# Patient Record
Sex: FEMALE | Race: BLACK OR AFRICAN AMERICAN | Employment: UNEMPLOYED | URBAN - METROPOLITAN AREA
[De-identification: names, ages, dates, MRNs, and addresses within clinical notes are randomized per-mention and may not be internally consistent; named-entity substitution may affect disease eponyms.]

---

## 2023-10-20 ENCOUNTER — HOSPITAL ENCOUNTER (EMERGENCY)
Age: 43
Discharge: ANOTHER HOSPITAL | End: 2023-10-21
Attending: STUDENT IN AN ORGANIZED HEALTH CARE EDUCATION/TRAINING PROGRAM
Payer: COMMERCIAL

## 2023-10-20 ENCOUNTER — APPOINTMENT (OUTPATIENT)
Dept: GENERAL RADIOLOGY | Age: 43
End: 2023-10-20
Payer: COMMERCIAL

## 2023-10-20 DIAGNOSIS — I63.9 CEREBROVASCULAR ACCIDENT (CVA), UNSPECIFIED MECHANISM (HCC): Primary | ICD-10-CM

## 2023-10-20 DIAGNOSIS — F19.10 POLYSUBSTANCE ABUSE (HCC): ICD-10-CM

## 2023-10-20 DIAGNOSIS — R94.31 PROLONGED Q-T INTERVAL ON ECG: ICD-10-CM

## 2023-10-20 LAB
APTT BLD: 26.6 SEC (ref 22.7–35.9)
INR PPP: 1.11 (ref 0.84–1.16)
PROTHROMBIN TIME: 14.3 SEC (ref 11.5–14.8)

## 2023-10-20 PROCEDURE — 80307 DRUG TEST PRSMV CHEM ANLYZR: CPT

## 2023-10-20 PROCEDURE — 83735 ASSAY OF MAGNESIUM: CPT

## 2023-10-20 PROCEDURE — 82140 ASSAY OF AMMONIA: CPT

## 2023-10-20 PROCEDURE — 80143 DRUG ASSAY ACETAMINOPHEN: CPT

## 2023-10-20 PROCEDURE — 99285 EMERGENCY DEPT VISIT HI MDM: CPT

## 2023-10-20 PROCEDURE — 93005 ELECTROCARDIOGRAM TRACING: CPT | Performed by: STUDENT IN AN ORGANIZED HEALTH CARE EDUCATION/TRAINING PROGRAM

## 2023-10-20 PROCEDURE — 80053 COMPREHEN METABOLIC PANEL: CPT

## 2023-10-20 PROCEDURE — 2580000003 HC RX 258: Performed by: STUDENT IN AN ORGANIZED HEALTH CARE EDUCATION/TRAINING PROGRAM

## 2023-10-20 PROCEDURE — 84443 ASSAY THYROID STIM HORMONE: CPT

## 2023-10-20 PROCEDURE — 31500 INSERT EMERGENCY AIRWAY: CPT

## 2023-10-20 PROCEDURE — 84480 ASSAY TRIIODOTHYRONINE (T3): CPT

## 2023-10-20 PROCEDURE — 71045 X-RAY EXAM CHEST 1 VIEW: CPT

## 2023-10-20 PROCEDURE — 85610 PROTHROMBIN TIME: CPT

## 2023-10-20 PROCEDURE — 85730 THROMBOPLASTIN TIME PARTIAL: CPT

## 2023-10-20 PROCEDURE — 84703 CHORIONIC GONADOTROPIN ASSAY: CPT

## 2023-10-20 PROCEDURE — 80179 DRUG ASSAY SALICYLATE: CPT

## 2023-10-20 PROCEDURE — 82077 ASSAY SPEC XCP UR&BREATH IA: CPT

## 2023-10-20 PROCEDURE — 84484 ASSAY OF TROPONIN QUANT: CPT

## 2023-10-20 PROCEDURE — 84439 ASSAY OF FREE THYROXINE: CPT

## 2023-10-20 PROCEDURE — 83605 ASSAY OF LACTIC ACID: CPT

## 2023-10-20 PROCEDURE — 85025 COMPLETE CBC W/AUTO DIFF WBC: CPT

## 2023-10-20 PROCEDURE — 82803 BLOOD GASES ANY COMBINATION: CPT

## 2023-10-20 PROCEDURE — 83880 ASSAY OF NATRIURETIC PEPTIDE: CPT

## 2023-10-20 PROCEDURE — 83690 ASSAY OF LIPASE: CPT

## 2023-10-20 RX ORDER — 0.9 % SODIUM CHLORIDE 0.9 %
1000 INTRAVENOUS SOLUTION INTRAVENOUS ONCE
Status: COMPLETED | OUTPATIENT
Start: 2023-10-20 | End: 2023-10-21

## 2023-10-20 RX ADMIN — SODIUM CHLORIDE 1000 ML: 9 INJECTION, SOLUTION INTRAVENOUS at 23:37

## 2023-10-21 ENCOUNTER — APPOINTMENT (OUTPATIENT)
Dept: CT IMAGING | Age: 43
End: 2023-10-21
Payer: COMMERCIAL

## 2023-10-21 ENCOUNTER — APPOINTMENT (OUTPATIENT)
Dept: GENERAL RADIOLOGY | Age: 43
End: 2023-10-21
Attending: INTERNAL MEDICINE
Payer: COMMERCIAL

## 2023-10-21 ENCOUNTER — APPOINTMENT (OUTPATIENT)
Dept: CT IMAGING | Age: 43
End: 2023-10-21
Attending: INTERNAL MEDICINE
Payer: COMMERCIAL

## 2023-10-21 ENCOUNTER — ANESTHESIA EVENT (OUTPATIENT)
Dept: OPERATING ROOM | Age: 43
End: 2023-10-21
Payer: COMMERCIAL

## 2023-10-21 ENCOUNTER — APPOINTMENT (OUTPATIENT)
Dept: GENERAL RADIOLOGY | Age: 43
End: 2023-10-21
Payer: COMMERCIAL

## 2023-10-21 ENCOUNTER — ANESTHESIA (OUTPATIENT)
Dept: OPERATING ROOM | Age: 43
End: 2023-10-21
Payer: COMMERCIAL

## 2023-10-21 ENCOUNTER — HOSPITAL ENCOUNTER (INPATIENT)
Age: 43
LOS: 14 days | Discharge: NURSING, REHAB OR LONG-TERM CARE | End: 2023-11-04
Attending: INTERNAL MEDICINE | Admitting: INTERNAL MEDICINE
Payer: COMMERCIAL

## 2023-10-21 VITALS
SYSTOLIC BLOOD PRESSURE: 173 MMHG | OXYGEN SATURATION: 100 % | WEIGHT: 263.23 LBS | RESPIRATION RATE: 22 BRPM | HEART RATE: 97 BPM | DIASTOLIC BLOOD PRESSURE: 127 MMHG

## 2023-10-21 DIAGNOSIS — I63.512 LEFT MIDDLE CEREBRAL ARTERY STROKE (HCC): ICD-10-CM

## 2023-10-21 DIAGNOSIS — I63.9 ACUTE CEREBROVASCULAR ACCIDENT (CVA) (HCC): ICD-10-CM

## 2023-10-21 DIAGNOSIS — I63.512 ACUTE ISCHEMIC LEFT MCA STROKE (HCC): Primary | ICD-10-CM

## 2023-10-21 PROBLEM — G93.5 BRAIN COMPRESSION (HCC): Status: ACTIVE | Noted: 2023-10-21

## 2023-10-21 PROBLEM — J96.01 ACUTE RESPIRATORY FAILURE WITH HYPOXIA (HCC): Status: ACTIVE | Noted: 2023-10-21

## 2023-10-21 PROBLEM — G93.6 CEREBRAL EDEMA (HCC): Status: ACTIVE | Noted: 2023-10-21

## 2023-10-21 PROBLEM — I63.412 CEREBROVASCULAR ACCIDENT (CVA) DUE TO EMBOLISM OF LEFT MIDDLE CEREBRAL ARTERY (HCC): Status: ACTIVE | Noted: 2023-10-21

## 2023-10-21 LAB
ABO + RH BLD: NORMAL
ABO + RH BLD: NORMAL
ALBUMIN SERPL-MCNC: 4.3 G/DL (ref 3.4–5)
ALBUMIN/GLOB SERPL: 1.1 {RATIO} (ref 1.1–2.2)
ALP SERPL-CCNC: 68 U/L (ref 40–129)
ALT SERPL-CCNC: 16 U/L (ref 10–40)
AMMONIA PLAS-SCNC: 25 UMOL/L (ref 11–51)
AMPHETAMINES UR QL SCN>1000 NG/ML: POSITIVE
ANION GAP SERPL CALCULATED.3IONS-SCNC: 13 MMOL/L (ref 3–16)
ANION GAP SERPL CALCULATED.3IONS-SCNC: 9 MMOL/L (ref 3–16)
ANISOCYTOSIS BLD QL SMEAR: ABNORMAL
APAP SERPL-MCNC: <5 UG/ML (ref 10–30)
AST SERPL-CCNC: 22 U/L (ref 15–37)
BARBITURATES UR QL SCN>200 NG/ML: ABNORMAL
BASE EXCESS BLDA CALC-SCNC: 0 MMOL/L (ref -3–3)
BASE EXCESS BLDV CALC-SCNC: 1.4 MMOL/L
BASOPHILS # BLD: 0 K/UL (ref 0–0.2)
BASOPHILS NFR BLD: 0 %
BENZODIAZ UR QL SCN>200 NG/ML: ABNORMAL
BILIRUB SERPL-MCNC: 0.3 MG/DL (ref 0–1)
BLD GP AB SCN SERPL QL: NORMAL
BUN SERPL-MCNC: 18 MG/DL (ref 7–20)
BUN SERPL-MCNC: 20 MG/DL (ref 7–20)
CA-I BLD-SCNC: 1.18 MMOL/L (ref 1.12–1.32)
CALCIUM SERPL-MCNC: 8.6 MG/DL (ref 8.3–10.6)
CALCIUM SERPL-MCNC: 8.8 MG/DL (ref 8.3–10.6)
CANNABINOIDS UR QL SCN>50 NG/ML: POSITIVE
CHLORIDE SERPL-SCNC: 102 MMOL/L (ref 99–110)
CHLORIDE SERPL-SCNC: 109 MMOL/L (ref 99–110)
CO2 BLDA-SCNC: 26 MMOL/L
CO2 BLDV-SCNC: 28 MMOL/L
CO2 SERPL-SCNC: 23 MMOL/L (ref 21–32)
CO2 SERPL-SCNC: 26 MMOL/L (ref 21–32)
COCAINE UR QL SCN: ABNORMAL
COHGB MFR BLDV: 1.7 %
CREAT SERPL-MCNC: 0.7 MG/DL (ref 0.6–1.1)
CREAT SERPL-MCNC: 0.7 MG/DL (ref 0.6–1.1)
DEPRECATED RDW RBC AUTO: 18.2 % (ref 12.4–15.4)
DRUG SCREEN COMMENT UR-IMP: ABNORMAL
EKG ATRIAL RATE: 80 BPM
EKG DIAGNOSIS: NORMAL
EKG P AXIS: 44 DEGREES
EKG P-R INTERVAL: 168 MS
EKG Q-T INTERVAL: 548 MS
EKG QRS DURATION: 100 MS
EKG QTC CALCULATION (BAZETT): 632 MS
EKG R AXIS: 73 DEGREES
EKG T AXIS: 46 DEGREES
EKG VENTRICULAR RATE: 80 BPM
EOSINOPHIL # BLD: 0 K/UL (ref 0–0.6)
EOSINOPHIL NFR BLD: 0 %
ETHANOLAMINE SERPL-MCNC: NORMAL MG/DL (ref 0–0.08)
FENTANYL SCREEN, URINE: ABNORMAL
GFR SERPLBLD CREATININE-BSD FMLA CKD-EPI: >60 ML/MIN/{1.73_M2}
GFR SERPLBLD CREATININE-BSD FMLA CKD-EPI: >60 ML/MIN/{1.73_M2}
GLUCOSE BLD-MCNC: 119 MG/DL (ref 70–99)
GLUCOSE SERPL-MCNC: 115 MG/DL (ref 70–99)
GLUCOSE SERPL-MCNC: 115 MG/DL (ref 70–99)
HCG SERPL QL: NEGATIVE
HCO3 BLDA-SCNC: 24.9 MMOL/L (ref 21–29)
HCO3 BLDV-SCNC: 27 MMOL/L (ref 23–29)
HCT VFR BLD AUTO: 32.4 % (ref 36–48)
HGB BLD-MCNC: 9.7 G/DL (ref 12–16)
HYPOCHROMIA BLD QL SMEAR: ABNORMAL
LACTATE BLD-SCNC: 0.68 MMOL/L (ref 0.4–2)
LACTATE BLDV-SCNC: 1.4 MMOL/L (ref 0.4–1.9)
LIPASE SERPL-CCNC: 17 U/L (ref 13–60)
LYMPHOCYTES # BLD: 0.7 K/UL (ref 1–5.1)
LYMPHOCYTES NFR BLD: 6 %
MAGNESIUM SERPL-MCNC: 2 MG/DL (ref 1.8–2.4)
MAGNESIUM SERPL-MCNC: 2.2 MG/DL (ref 1.8–2.4)
MCH RBC QN AUTO: 20.5 PG (ref 26–34)
MCHC RBC AUTO-ENTMCNC: 30.1 G/DL (ref 31–36)
MCV RBC AUTO: 68.1 FL (ref 80–100)
METHADONE UR QL SCN>300 NG/ML: ABNORMAL
METHGB MFR BLDV: 1 %
MONOCYTES # BLD: 0.7 K/UL (ref 0–1.3)
MONOCYTES NFR BLD: 6 %
NEUTROPHILS # BLD: 10.8 K/UL (ref 1.7–7.7)
NEUTROPHILS NFR BLD: 88 %
NT-PROBNP SERPL-MCNC: 109 PG/ML (ref 0–124)
O2 THERAPY: NORMAL
OPIATES UR QL SCN>300 NG/ML: ABNORMAL
OVALOCYTES BLD QL SMEAR: ABNORMAL
OXYCODONE UR QL SCN: ABNORMAL
PATH INTERP BLD-IMP: YES
PCO2 BLDA: 38.7 MM HG (ref 35–45)
PCO2 BLDV: 44 MMHG (ref 40–50)
PCP UR QL SCN>25 NG/ML: ABNORMAL
PERFORMED ON: ABNORMAL
PH BLDA: 7.42 [PH] (ref 7.35–7.45)
PH BLDV: 7.39 [PH] (ref 7.35–7.45)
PH UR STRIP: 6 [PH]
PLATELET # BLD AUTO: 518 K/UL (ref 135–450)
PLATELET BLD QL SMEAR: ABNORMAL
PMV BLD AUTO: 7.2 FL (ref 5–10.5)
PO2 BLDA: 113.9 MM HG (ref 75–108)
PO2 BLDV: <30 MMHG
POC SAMPLE TYPE: ABNORMAL
POIKILOCYTOSIS BLD QL SMEAR: ABNORMAL
POLYCHROMASIA BLD QL SMEAR: ABNORMAL
POTASSIUM BLD-SCNC: 3.4 MMOL/L (ref 3.5–5.1)
POTASSIUM SERPL-SCNC: 3.3 MMOL/L (ref 3.5–5.1)
POTASSIUM SERPL-SCNC: 3.6 MMOL/L (ref 3.5–5.1)
PROT SERPL-MCNC: 8.1 G/DL (ref 6.4–8.2)
RBC # BLD AUTO: 4.75 M/UL (ref 4–5.2)
REASON FOR REJECTION: NORMAL
REJECTED TEST: NORMAL
SALICYLATES SERPL-MCNC: <0.3 MG/DL (ref 15–30)
SAO2 % BLDA: 99 % (ref 93–100)
SAO2 % BLDV: 56 %
SCHISTOCYTES BLD QL SMEAR: ABNORMAL
SODIUM BLD-SCNC: 147 MMOL/L (ref 136–145)
SODIUM SERPL-SCNC: 138 MMOL/L (ref 136–145)
SODIUM SERPL-SCNC: 144 MMOL/L (ref 136–145)
SODIUM SERPL-SCNC: 144 MMOL/L (ref 136–145)
SODIUM SERPL-SCNC: 148 MMOL/L (ref 136–145)
T3 SERPL-MCNC: 0.88 NG/ML (ref 0.8–2)
T4 FREE SERPL-MCNC: 1.2 NG/DL (ref 0.9–1.8)
TARGETS BLD QL SMEAR: ABNORMAL
TRIGL SERPL-MCNC: 350 MG/DL (ref 0–150)
TRIGL SERPL-MCNC: 350 MG/DL (ref 0–150)
TROPONIN, HIGH SENSITIVITY: <6 NG/L (ref 0–14)
TSH SERPL DL<=0.005 MIU/L-ACNC: 0.25 UIU/ML (ref 0.27–4.2)
WBC # BLD AUTO: 12.3 K/UL (ref 4–11)

## 2023-10-21 PROCEDURE — 86901 BLOOD TYPING SEROLOGIC RH(D): CPT

## 2023-10-21 PROCEDURE — 86850 RBC ANTIBODY SCREEN: CPT

## 2023-10-21 PROCEDURE — 5A1945Z RESPIRATORY VENTILATION, 24-96 CONSECUTIVE HOURS: ICD-10-PCS | Performed by: INTERNAL MEDICINE

## 2023-10-21 PROCEDURE — 80048 BASIC METABOLIC PNL TOTAL CA: CPT

## 2023-10-21 PROCEDURE — 2000000000 HC ICU R&B

## 2023-10-21 PROCEDURE — 6370000000 HC RX 637 (ALT 250 FOR IP): Performed by: STUDENT IN AN ORGANIZED HEALTH CARE EDUCATION/TRAINING PROGRAM

## 2023-10-21 PROCEDURE — 99291 CRITICAL CARE FIRST HOUR: CPT | Performed by: INTERNAL MEDICINE

## 2023-10-21 PROCEDURE — 99222 1ST HOSP IP/OBS MODERATE 55: CPT | Performed by: SURGERY

## 2023-10-21 PROCEDURE — 2720000010 HC SURG SUPPLY STERILE: Performed by: STUDENT IN AN ORGANIZED HEALTH CARE EDUCATION/TRAINING PROGRAM

## 2023-10-21 PROCEDURE — 00N00ZZ RELEASE BRAIN, OPEN APPROACH: ICD-10-PCS | Performed by: STUDENT IN AN ORGANIZED HEALTH CARE EDUCATION/TRAINING PROGRAM

## 2023-10-21 PROCEDURE — 83735 ASSAY OF MAGNESIUM: CPT

## 2023-10-21 PROCEDURE — 36415 COLL VENOUS BLD VENIPUNCTURE: CPT

## 2023-10-21 PROCEDURE — 82803 BLOOD GASES ANY COMBINATION: CPT

## 2023-10-21 PROCEDURE — 6360000004 HC RX CONTRAST MEDICATION: Performed by: STUDENT IN AN ORGANIZED HEALTH CARE EDUCATION/TRAINING PROGRAM

## 2023-10-21 PROCEDURE — 0BH17EZ INSERTION OF ENDOTRACHEAL AIRWAY INTO TRACHEA, VIA NATURAL OR ARTIFICIAL OPENING: ICD-10-PCS | Performed by: INTERNAL MEDICINE

## 2023-10-21 PROCEDURE — 3600000004 HC SURGERY LEVEL 4 BASE: Performed by: STUDENT IN AN ORGANIZED HEALTH CARE EDUCATION/TRAINING PROGRAM

## 2023-10-21 PROCEDURE — 84478 ASSAY OF TRIGLYCERIDES: CPT

## 2023-10-21 PROCEDURE — C1763 CONN TISS, NON-HUMAN: HCPCS | Performed by: STUDENT IN AN ORGANIZED HEALTH CARE EDUCATION/TRAINING PROGRAM

## 2023-10-21 PROCEDURE — 96365 THER/PROPH/DIAG IV INF INIT: CPT

## 2023-10-21 PROCEDURE — 87205 SMEAR GRAM STAIN: CPT

## 2023-10-21 PROCEDURE — 3600000014 HC SURGERY LEVEL 4 ADDTL 15MIN: Performed by: STUDENT IN AN ORGANIZED HEALTH CARE EDUCATION/TRAINING PROGRAM

## 2023-10-21 PROCEDURE — 02HV33Z INSERTION OF INFUSION DEVICE INTO SUPERIOR VENA CAVA, PERCUTANEOUS APPROACH: ICD-10-PCS

## 2023-10-21 PROCEDURE — 82330 ASSAY OF CALCIUM: CPT

## 2023-10-21 PROCEDURE — 71045 X-RAY EXAM CHEST 1 VIEW: CPT

## 2023-10-21 PROCEDURE — 2580000003 HC RX 258

## 2023-10-21 PROCEDURE — 2500000003 HC RX 250 WO HCPCS: Performed by: STUDENT IN AN ORGANIZED HEALTH CARE EDUCATION/TRAINING PROGRAM

## 2023-10-21 PROCEDURE — 83605 ASSAY OF LACTIC ACID: CPT

## 2023-10-21 PROCEDURE — 87102 FUNGUS ISOLATION CULTURE: CPT

## 2023-10-21 PROCEDURE — 84132 ASSAY OF SERUM POTASSIUM: CPT

## 2023-10-21 PROCEDURE — 94002 VENT MGMT INPAT INIT DAY: CPT

## 2023-10-21 PROCEDURE — 2709999900 HC NON-CHARGEABLE SUPPLY: Performed by: STUDENT IN AN ORGANIZED HEALTH CARE EDUCATION/TRAINING PROGRAM

## 2023-10-21 PROCEDURE — 84295 ASSAY OF SERUM SODIUM: CPT

## 2023-10-21 PROCEDURE — 2500000003 HC RX 250 WO HCPCS: Performed by: ANESTHESIOLOGY

## 2023-10-21 PROCEDURE — 99291 CRITICAL CARE FIRST HOUR: CPT | Performed by: PSYCHIATRY & NEUROLOGY

## 2023-10-21 PROCEDURE — A4216 STERILE WATER/SALINE, 10 ML: HCPCS

## 2023-10-21 PROCEDURE — C1762 CONN TISS, HUMAN(INC FASCIA): HCPCS | Performed by: STUDENT IN AN ORGANIZED HEALTH CARE EDUCATION/TRAINING PROGRAM

## 2023-10-21 PROCEDURE — 6360000002 HC RX W HCPCS

## 2023-10-21 PROCEDURE — 2580000003 HC RX 258: Performed by: ANESTHESIOLOGY

## 2023-10-21 PROCEDURE — 82947 ASSAY GLUCOSE BLOOD QUANT: CPT

## 2023-10-21 PROCEDURE — 3700000000 HC ANESTHESIA ATTENDED CARE: Performed by: STUDENT IN AN ORGANIZED HEALTH CARE EDUCATION/TRAINING PROGRAM

## 2023-10-21 PROCEDURE — 6360000002 HC RX W HCPCS: Performed by: ANESTHESIOLOGY

## 2023-10-21 PROCEDURE — 6360000002 HC RX W HCPCS: Performed by: STUDENT IN AN ORGANIZED HEALTH CARE EDUCATION/TRAINING PROGRAM

## 2023-10-21 PROCEDURE — 70498 CT ANGIOGRAPHY NECK: CPT

## 2023-10-21 PROCEDURE — 6370000000 HC RX 637 (ALT 250 FOR IP)

## 2023-10-21 PROCEDURE — 2500000003 HC RX 250 WO HCPCS

## 2023-10-21 PROCEDURE — 2500000003 HC RX 250 WO HCPCS: Performed by: NURSE PRACTITIONER

## 2023-10-21 PROCEDURE — 70450 CT HEAD/BRAIN W/O DYE: CPT

## 2023-10-21 PROCEDURE — 93010 ELECTROCARDIOGRAM REPORT: CPT | Performed by: INTERNAL MEDICINE

## 2023-10-21 PROCEDURE — 3700000001 HC ADD 15 MINUTES (ANESTHESIA): Performed by: STUDENT IN AN ORGANIZED HEALTH CARE EDUCATION/TRAINING PROGRAM

## 2023-10-21 PROCEDURE — APPNB180 APP NON BILLABLE TIME > 60 MINS

## 2023-10-21 PROCEDURE — 96375 TX/PRO/DX INJ NEW DRUG ADDON: CPT

## 2023-10-21 PROCEDURE — 86900 BLOOD TYPING SEROLOGIC ABO: CPT

## 2023-10-21 DEVICE — DURAGEN® PLUS DURAL REGENERATION MATRIX, 5 IN X 7 IN (12.5 CM X 17.5 CM)
Type: IMPLANTABLE DEVICE | Site: BRAIN | Status: FUNCTIONAL
Brand: DURAGEN® PLUS

## 2023-10-21 DEVICE — DURA-GUARD DURAL REPAIR PATCH IS PREPARED FROM BOVINE PERICARDIUM WHICH IS CROSS-LINKED WITH GLUTARALDEHYDE. THE PERICARDIUM IS PROCURED FROM CATTLE ORIGINATING IN THE UNITED STATES. DURA-GUARD DURAL REPAIR PATCH IS CHEMICALLY STERILIZED USING ETHANOL AND PROPYLENE OXIDE. DURA-GUARD DURAL REPAIR PATCH HAS BEEN TREATED WITH 1 MOLAR SODIUM HYDROXIDE FOR A MINIMUM OF 60 MINUTES AT 20 - 25 C.  DURA-GUARD DURAL REPAIR PATCH IS PACKAGED IN A CONTAINER FILLED WITH STERILE, NON-PYROGENIC WATER CONTAINING PROPYLENE OXIDE. THE CONTENTS OF THE UNOPENED, UNDAMAGED CONTAINER ARE STERILE.
Type: IMPLANTABLE DEVICE | Site: BRAIN | Status: FUNCTIONAL
Brand: DURA-GUARD DURAL REPAIR PATCH

## 2023-10-21 RX ORDER — ONDANSETRON 2 MG/ML
4 INJECTION INTRAMUSCULAR; INTRAVENOUS EVERY 6 HOURS PRN
Status: DISCONTINUED | OUTPATIENT
Start: 2023-10-21 | End: 2023-10-21

## 2023-10-21 RX ORDER — ROCURONIUM BROMIDE 10 MG/ML
INJECTION, SOLUTION INTRAVENOUS DAILY PRN
Status: COMPLETED | OUTPATIENT
Start: 2023-10-21 | End: 2023-10-21

## 2023-10-21 RX ORDER — PROPOFOL 10 MG/ML
INJECTION, EMULSION INTRAVENOUS
Status: COMPLETED
Start: 2023-10-21 | End: 2023-10-21

## 2023-10-21 RX ORDER — ETOMIDATE 2 MG/ML
20 INJECTION INTRAVENOUS ONCE
Status: DISCONTINUED | OUTPATIENT
Start: 2023-10-21 | End: 2023-10-21 | Stop reason: HOSPADM

## 2023-10-21 RX ORDER — ETOMIDATE 2 MG/ML
INJECTION INTRAVENOUS DAILY PRN
Status: COMPLETED | OUTPATIENT
Start: 2023-10-21 | End: 2023-10-21

## 2023-10-21 RX ORDER — FENTANYL CITRATE-0.9 % NACL/PF 10 MCG/ML
25-200 PLASTIC BAG, INJECTION (ML) INTRAVENOUS CONTINUOUS
Status: DISCONTINUED | OUTPATIENT
Start: 2023-10-21 | End: 2023-10-25 | Stop reason: ALTCHOICE

## 2023-10-21 RX ORDER — PROPOFOL 10 MG/ML
5-50 INJECTION, EMULSION INTRAVENOUS CONTINUOUS
Status: DISCONTINUED | OUTPATIENT
Start: 2023-10-21 | End: 2023-10-25 | Stop reason: ALTCHOICE

## 2023-10-21 RX ORDER — SODIUM CHLORIDE 9 MG/ML
INJECTION, SOLUTION INTRAVENOUS PRN
Status: DISCONTINUED | OUTPATIENT
Start: 2023-10-21 | End: 2023-11-04 | Stop reason: HOSPADM

## 2023-10-21 RX ORDER — MANNITOL 20 G/100ML
100 INJECTION, SOLUTION INTRAVENOUS ONCE
Status: COMPLETED | OUTPATIENT
Start: 2023-10-21 | End: 2023-10-21

## 2023-10-21 RX ORDER — 3% SODIUM CHLORIDE 3 G/100ML
50 INJECTION, SOLUTION INTRAVENOUS CONTINUOUS
Status: DISCONTINUED | OUTPATIENT
Start: 2023-10-21 | End: 2023-10-21

## 2023-10-21 RX ORDER — MIDAZOLAM HYDROCHLORIDE 1 MG/ML
4 INJECTION INTRAMUSCULAR; INTRAVENOUS ONCE
Status: COMPLETED | OUTPATIENT
Start: 2023-10-21 | End: 2023-10-21

## 2023-10-21 RX ORDER — ROCURONIUM BROMIDE 10 MG/ML
100 INJECTION, SOLUTION INTRAVENOUS ONCE
Status: DISCONTINUED | OUTPATIENT
Start: 2023-10-21 | End: 2023-10-21 | Stop reason: HOSPADM

## 2023-10-21 RX ORDER — ROCURONIUM BROMIDE 10 MG/ML
INJECTION, SOLUTION INTRAVENOUS PRN
Status: DISCONTINUED | OUTPATIENT
Start: 2023-10-21 | End: 2023-10-21 | Stop reason: SDUPTHER

## 2023-10-21 RX ORDER — SODIUM CHLORIDE 0.9 % (FLUSH) 0.9 %
5-40 SYRINGE (ML) INJECTION EVERY 12 HOURS SCHEDULED
Status: DISCONTINUED | OUTPATIENT
Start: 2023-10-21 | End: 2023-11-04 | Stop reason: HOSPADM

## 2023-10-21 RX ORDER — FENTANYL CITRATE-0.9 % NACL/PF 10 MCG/ML
25-200 PLASTIC BAG, INJECTION (ML) INTRAVENOUS CONTINUOUS
Status: DISCONTINUED | OUTPATIENT
Start: 2023-10-21 | End: 2023-10-21 | Stop reason: HOSPADM

## 2023-10-21 RX ORDER — 3% SODIUM CHLORIDE 3 G/100ML
50 INJECTION, SOLUTION INTRAVENOUS ONCE
Status: DISCONTINUED | OUTPATIENT
Start: 2023-10-21 | End: 2023-10-21 | Stop reason: HOSPADM

## 2023-10-21 RX ORDER — CEFAZOLIN SODIUM 1 G/3ML
INJECTION, POWDER, FOR SOLUTION INTRAMUSCULAR; INTRAVENOUS PRN
Status: DISCONTINUED | OUTPATIENT
Start: 2023-10-21 | End: 2023-10-21 | Stop reason: SDUPTHER

## 2023-10-21 RX ORDER — BUPIVACAINE HYDROCHLORIDE AND EPINEPHRINE 5; 5 MG/ML; UG/ML
INJECTION, SOLUTION EPIDURAL; INTRACAUDAL; PERINEURAL PRN
Status: DISCONTINUED | OUTPATIENT
Start: 2023-10-21 | End: 2023-10-21 | Stop reason: ALTCHOICE

## 2023-10-21 RX ORDER — SENNOSIDES A AND B 8.6 MG/1
1 TABLET, FILM COATED ORAL NIGHTLY PRN
Status: DISCONTINUED | OUTPATIENT
Start: 2023-10-21 | End: 2023-10-23

## 2023-10-21 RX ORDER — SODIUM CHLORIDE 9 MG/ML
INJECTION, SOLUTION INTRAVENOUS CONTINUOUS PRN
Status: DISCONTINUED | OUTPATIENT
Start: 2023-10-21 | End: 2023-10-21 | Stop reason: SDUPTHER

## 2023-10-21 RX ORDER — PROCHLORPERAZINE EDISYLATE 5 MG/ML
10 INJECTION INTRAMUSCULAR; INTRAVENOUS EVERY 6 HOURS PRN
Status: DISCONTINUED | OUTPATIENT
Start: 2023-10-21 | End: 2023-11-04 | Stop reason: HOSPADM

## 2023-10-21 RX ORDER — FENTANYL CITRATE 50 UG/ML
50 INJECTION, SOLUTION INTRAMUSCULAR; INTRAVENOUS ONCE
Status: COMPLETED | OUTPATIENT
Start: 2023-10-21 | End: 2023-10-21

## 2023-10-21 RX ORDER — ONDANSETRON 4 MG/1
4 TABLET, ORALLY DISINTEGRATING ORAL EVERY 8 HOURS PRN
Status: DISCONTINUED | OUTPATIENT
Start: 2023-10-21 | End: 2023-10-21

## 2023-10-21 RX ORDER — FENTANYL CITRATE 50 UG/ML
INJECTION, SOLUTION INTRAMUSCULAR; INTRAVENOUS PRN
Status: DISCONTINUED | OUTPATIENT
Start: 2023-10-21 | End: 2023-10-21 | Stop reason: SDUPTHER

## 2023-10-21 RX ORDER — MIDAZOLAM HYDROCHLORIDE 1 MG/ML
1-10 INJECTION, SOLUTION INTRAVENOUS CONTINUOUS
Status: DISCONTINUED | OUTPATIENT
Start: 2023-10-21 | End: 2023-10-21

## 2023-10-21 RX ORDER — PROPOFOL 10 MG/ML
5-50 INJECTION, EMULSION INTRAVENOUS CONTINUOUS
Status: DISCONTINUED | OUTPATIENT
Start: 2023-10-21 | End: 2023-10-21 | Stop reason: HOSPADM

## 2023-10-21 RX ORDER — MANNITOL 20 G/100ML
INJECTION, SOLUTION INTRAVENOUS PRN
Status: DISCONTINUED | OUTPATIENT
Start: 2023-10-21 | End: 2023-10-21 | Stop reason: SDUPTHER

## 2023-10-21 RX ORDER — ATORVASTATIN CALCIUM 80 MG/1
80 TABLET, FILM COATED ORAL NIGHTLY
Status: DISCONTINUED | OUTPATIENT
Start: 2023-10-21 | End: 2023-10-23

## 2023-10-21 RX ORDER — LABETALOL HYDROCHLORIDE 5 MG/ML
10 INJECTION, SOLUTION INTRAVENOUS EVERY 10 MIN PRN
Status: DISCONTINUED | OUTPATIENT
Start: 2023-10-21 | End: 2023-11-04 | Stop reason: HOSPADM

## 2023-10-21 RX ORDER — POLYETHYLENE GLYCOL 3350 17 G/17G
17 POWDER, FOR SOLUTION ORAL DAILY PRN
Status: DISCONTINUED | OUTPATIENT
Start: 2023-10-21 | End: 2023-10-23

## 2023-10-21 RX ORDER — 3% SODIUM CHLORIDE 3 G/100ML
25 INJECTION, SOLUTION INTRAVENOUS CONTINUOUS
Status: DISPENSED | OUTPATIENT
Start: 2023-10-21 | End: 2023-10-22

## 2023-10-21 RX ORDER — ACETAMINOPHEN 325 MG/1
650 TABLET ORAL EVERY 6 HOURS PRN
Status: DISCONTINUED | OUTPATIENT
Start: 2023-10-21 | End: 2023-10-23

## 2023-10-21 RX ORDER — MORPHINE SULFATE 10 MG/ML
INJECTION, SOLUTION INTRAMUSCULAR; INTRAVENOUS PRN
Status: DISCONTINUED | OUTPATIENT
Start: 2023-10-21 | End: 2023-10-21 | Stop reason: SDUPTHER

## 2023-10-21 RX ORDER — SODIUM CHLORIDE 0.9 % (FLUSH) 0.9 %
5-40 SYRINGE (ML) INJECTION PRN
Status: DISCONTINUED | OUTPATIENT
Start: 2023-10-21 | End: 2023-11-04 | Stop reason: HOSPADM

## 2023-10-21 RX ORDER — CHLORHEXIDINE GLUCONATE ORAL RINSE 1.2 MG/ML
15 SOLUTION DENTAL 2 TIMES DAILY
Status: DISCONTINUED | OUTPATIENT
Start: 2023-10-21 | End: 2023-11-04 | Stop reason: HOSPADM

## 2023-10-21 RX ORDER — MECLIZINE HYDROCHLORIDE 25 MG/1
25 TABLET ORAL 3 TIMES DAILY PRN
Status: DISCONTINUED | OUTPATIENT
Start: 2023-10-21 | End: 2023-10-21

## 2023-10-21 RX ADMIN — MORPHINE SULFATE 10 MG: 10 INJECTION, SOLUTION INTRAMUSCULAR; INTRAVENOUS at 20:54

## 2023-10-21 RX ADMIN — FENTANYL CITRATE 100 MCG: 50 INJECTION, SOLUTION INTRAMUSCULAR; INTRAVENOUS at 19:56

## 2023-10-21 RX ADMIN — SODIUM CHLORIDE 150 ML/HR: 3 INJECTION, SOLUTION INTRAVENOUS at 06:48

## 2023-10-21 RX ADMIN — ROCURONIUM BROMIDE 50 MG: 10 INJECTION, SOLUTION INTRAVENOUS at 20:39

## 2023-10-21 RX ADMIN — SODIUM CHLORIDE, PRESERVATIVE FREE 10 ML: 5 INJECTION INTRAVENOUS at 09:49

## 2023-10-21 RX ADMIN — ROCURONIUM BROMIDE 100 MG: 10 INJECTION INTRAVENOUS at 03:28

## 2023-10-21 RX ADMIN — MANNITOL 100 G: 20 INJECTION, SOLUTION INTRAVENOUS at 17:38

## 2023-10-21 RX ADMIN — CHLORHEXIDINE GLUCONATE 15 ML: 1.2 RINSE ORAL at 10:35

## 2023-10-21 RX ADMIN — SODIUM CHLORIDE 50 ML/HR: 3 INJECTION, SOLUTION INTRAVENOUS at 16:43

## 2023-10-21 RX ADMIN — IOPAMIDOL 75 ML: 755 INJECTION, SOLUTION INTRAVENOUS at 02:31

## 2023-10-21 RX ADMIN — ROCURONIUM BROMIDE 50 MG: 10 INJECTION, SOLUTION INTRAVENOUS at 19:37

## 2023-10-21 RX ADMIN — CEFAZOLIN SODIUM 3 G: 1 POWDER, FOR SOLUTION INTRAMUSCULAR; INTRAVENOUS at 19:48

## 2023-10-21 RX ADMIN — SODIUM CHLORIDE 5 MG/HR: 9 INJECTION, SOLUTION INTRAVENOUS at 22:51

## 2023-10-21 RX ADMIN — FENTANYL CITRATE 50 MCG: 50 INJECTION INTRAMUSCULAR; INTRAVENOUS at 03:35

## 2023-10-21 RX ADMIN — SODIUM CHLORIDE, PRESERVATIVE FREE 20 MG: 5 INJECTION INTRAVENOUS at 10:35

## 2023-10-21 RX ADMIN — Medication 25 MCG/HR: at 06:15

## 2023-10-21 RX ADMIN — PROPOFOL 20 MCG/KG/MIN: 10 INJECTION, EMULSION INTRAVENOUS at 04:00

## 2023-10-21 RX ADMIN — MANNITOL 50 G: 20 INJECTION, SOLUTION INTRAVENOUS at 20:37

## 2023-10-21 RX ADMIN — SODIUM CHLORIDE: 9 INJECTION, SOLUTION INTRAVENOUS at 19:24

## 2023-10-21 RX ADMIN — ETOMIDATE 20 MG: 2 INJECTION INTRAVENOUS at 03:27

## 2023-10-21 RX ADMIN — PROPOFOL 20 MCG/KG/MIN: 10 INJECTION, EMULSION INTRAVENOUS at 07:01

## 2023-10-21 RX ADMIN — MIDAZOLAM 4 MG: 1 INJECTION INTRAMUSCULAR; INTRAVENOUS at 04:37

## 2023-10-21 RX ADMIN — FENTANYL CITRATE 100 MCG: 50 INJECTION, SOLUTION INTRAMUSCULAR; INTRAVENOUS at 19:54

## 2023-10-21 RX ADMIN — Medication 50 MCG/HR: at 03:53

## 2023-10-21 ASSESSMENT — PULMONARY FUNCTION TESTS
PIF_VALUE: 11
PIF_VALUE: 574
PIF_VALUE: 11

## 2023-10-21 NOTE — CONSULTS
atorvastatin, 80 mg, Oral, Nightly   Infusions    propofol      fentaNYL      sodium chloride      3% sodium chloride        Antibiotics   Recent Abx Admin        No antibiotic orders with administrations found. IMPRESSION & RECOMMENDATIONS     IMPRESSION:  42 y/o with no significant past medical history who presents to [facility] as a transfer from [facility], found to have large acute left MCA distribution stroke with 7 mm of left-to-right midline shift, secondary to left cervical ICA, left intracranial ICA and left M1 MCA occlusions. Patient was not a candidate for TNK or EVT secondary to being out of the window for both of these procedures. She is on kip-crani watch at this point time. Case discussed with Dr. Juvenal Torres.      RECOMMENDATIONS:  -Obtain MRI of the brain w/o contrast to eval for stroke  -Echocardiogram with bubble - if not previously completed   -Check lipid panel and hemoglobin A1C if not already completed     Medications:  - Start 3% hypertonic saline continuous gtt (ordered)  -High-intensity statin   - Hold antiplatelet therapies at this time given tentative kip-crani  -SCDs for DVT prophylaxis    Consults / Nursing Care  - Hypercoagulable workup given stroke in the young (ordered)  -HOB elevated to help prevent aspiration  [facility] Neurologic Exams & Vitals  -NIHSS per guidelines   -Telemetry   -PT/OT: eval and treat  -PMR consult if rehab recommended   -ST: eval and treat and dysphagia screen  -Nursing bedside swallow prior to any PO intake   -Blood Pressure Management: Keep SBP < 180    Follow up / Discharge Recommendations:  -If no obvious source of stroke identified will need 30 day event monitor arranged at discharge  -Stroke Education at Discharge  -Follow up w/ Neurology in 3 months       Management and plan discussed with:   Bedside nurse  Dr. Steven Arellano, APRN - CNP   Neurology & Neurocritical Care   10/21/2023 5:51 AM    ICU Patients:

## 2023-10-21 NOTE — PROGRESS NOTES
Pt admitted to ICU room 4515 on 50 mcg/kg/min of Propofol. On this much sedation, she is able to respond to pain in LUE and LLE, does not move RUE or RLE to pain. Pupillometer shows pupils to be 2 mm, equal and briskly reactive. She does not open eyes and does not follow commands. CHG bath and 4 eyes skin assessment complete. Residents at bedside to assess patient for central line placement.

## 2023-10-21 NOTE — ED NOTES
Pt belongings including earrings and underwear bagged and sent to security.      Adan Meza RN  10/21/23 2812

## 2023-10-21 NOTE — PROGRESS NOTES
4 Eyes Skin Assessment     NAME:  Lulu Mckeon OF BIRTH:  1980  MEDICAL RECORD NUMBER:  [id number]    The patient is being assessed for  Admission    I agree that at least one RN has performed a thorough Head to Toe Skin Assessment on the patient. ALL assessment sites listed below have been assessed. Areas assessed by both nurses:    Head, Face, Ears, Shoulders, Back, Chest, Arms, Elbows, Hands, Sacrum. Buttock, Coccyx, Ischium, Legs. Feet and Heels, and Under Medical Devices         Does the Patient have a Wound?  No noted wound(s)       Paras Prevention initiated by RN: No  Wound Care Orders initiated by RN: No    Pressure Injury (Stage 3,4, Unstageable, DTI, NWPT, and Complex wounds) if present, place Wound referral order by RN under : No    New Ostomies, if present place, Ostomy referral order under : No     Nurse 1 eSignature: Electronically signed by Reilly Guzmán RN on 10/21/23 at 7:50 AM EDT    **SHARE this note so that the co-signing nurse can place an eSignature**    Nurse 2 eSignature: Electronically signed by Michelle Barnett RN on 10/22/23 at 4:20 AM EDT

## 2023-10-21 NOTE — CONSULTS
NEUROSURGERY CONSULTATION NOTE    Admitting Physician:   Primary Care Physician: No primary care provider on file. Chief Complaint: stroke    HPI: 38y F presented altered to [facility]. Found to have large completed left MCA ischemic infarct and left ICA and elft MCA occlusion on CTA. Intubated and outside hospital and transferred to [facility].    [facility] reportedly notable for multiple spontaneous miscarriages     PMHx:No past medical history on file. SURGHx:No past surgical history on file. FAMHx: No family history on file. SOCHx:   Social History     Tobacco Use    Smoking status: Not on file    Smokeless tobacco: Not on file   Substance Use Topics    Alcohol use: Not on file       ALLERGIES:Patient has no known allergies.      MEDS:  Prior to Admission medications    Not on File        Intubated, no sedation  GCS 3T5  Eyes open to loud voice  Pupils 2mm, brisk bilaterally  Regards examiner  No movement RUE RLE  Brisk movements LUE LLE      IMAGING:   MRI BRAIN WO CONTRAST    (Results Pending)       A/P: 38y F with completed left hemispheric ischemic stroke    Hypertonic saline  Hourly pupillometry checks  Hourly neuro checks  Will follow closely for need for decompressive hemicraniectomy  Discussed with family    Tam Paiz MD

## 2023-10-21 NOTE — PROGRESS NOTES
Call from radiology saying to notify neurology of worsening CT scan. Neuro called and now at bedside.

## 2023-10-21 NOTE — ED NOTES
RN called MD to clarify order for hypertonic saline. MD states to give a 50mL over 5 minutes. Second RN Elke Pitts at bedside to verify order and administration.      Armando Merritt RN  10/21/23 1764

## 2023-10-21 NOTE — PLAN OF CARE
[facility] Hospitalist brief note  Consult received. Case reviewed with ER physician  17-year-old female drug abuser transferred from [facility] for a large left MCA territory stroke with impending herniation for emergent surgical decompression. Full note to follow. No primary care provider on file.     Thanks  Jonathan Almonte MD

## 2023-10-21 NOTE — ED NOTES
Attempted to call report Yumiko WOMACK. States she will call me back.      Karis Shaffer RN  10/21/23 7934

## 2023-10-21 NOTE — H&P
ICU H&P      Hospital Day: 1  ICU Day: 1                                                         Code:Full Code  Admit Date: 10/21/2023  PCP: No primary care provider on file. CC: AMS, drug overdose    HISTORY OF PRESENT ILLNESS:   Laine Koenig is a 42 yo female with no available documented medical history who presented from home to [facility] ED after family found her unconscious. Per ED note, her family states they believe she took multiple illicit drugs as well as large amounts of alcohol and had been sleeping for over 20 hours. EMS was called, gave Narcan with minimal response. On arrival to the ED she was nonverbal, was able to intermittently follow commands. GCS of 9 on arrival.     ED Course: Temp: 98.5 F, HR 60 bpm, /89, RR 20/min, SpO2 100% RA    Labs showed BMP WNL, CBC with WBC 12.3, Hgb 9.7, Plt 518. Trop, ProBNP both WNL. LFTS WNL. TSH 0.25. UDS positive for amphetamines and THC. VBG with pH 7.39, pCO2 44.0, Bicarb 27. Chest Xray showed mild cardiomegaly and central pulmonary congestion with no obvious infiltrate or effusion. CT Head showed large left MCA infarct, 7 mm left-to-right midline shift  CTA Head Neck showed left cervical ICA, left intracranial ICA and left M1 MCA occlusions. ED paged the stroke and neurosurgery teams, who decided on immediate transfer to [facility] for further neurocritical care. Prior to transport the patient was intubated using etomidate and rocuronium for airway protection 2/2 GCS decline with decorticate posturing. On my exam the patient is intubated and sedated on propofol and fentanyl. Non-localizing response to pain in her left upper and lower extremities as well as her right lower extremity, but her left lower extremity is unresponsive to pain. No spontaneous movements or response to verbal commands. Pupils appear equal, 4mm, minimally reactive to light. Unable to follow commands.      PAST HISTORY:   No past

## 2023-10-21 NOTE — ED NOTES
Pt currently unconscious at this time, only responsive to pain and is unable to answer any questions.      Andrew Canales RN  10/21/23 7916

## 2023-10-21 NOTE — PROGRESS NOTES
Patient intubated with 7.5 tube secured at 23LL. Positive color change and bilateral breathsounds.  Electronically signed by Fidencio Martínez RCP on 10/21/2023 at 3:51 AM

## 2023-10-21 NOTE — CONSULTS
Comprehensive Nutrition Assessment    RECOMMENDATIONS:  No plan for TF at this time; recommend initiating TF w/in 48 hours if pt remains intubated, please consult for orders  Nutrition Education: Education not appropriate     NUTRITION ASSESSMENT:   Nutritional summary & status: New vent: Pt intubated after being found down by sister. No current pressor requirements. Sedated on fentanyl and propofol. Propofol providing 378 kcal/day. No wt hx in EMR. No reports of poor appetite/intakes prior to admission by family. No current ht in EMR. Discussed w/ MD, no plan for TF currently, will close consult. Admission // PMH: CVA//stroke, polysubstance abuse, acute respiratory failure    MALNUTRITION ASSESSMENT  Context of Malnutrition: Acute Illness   Malnutrition Status: Insufficient data  Findings of the 6 clinical characteristics of malnutrition (Minimum of 2 out of 6 clinical characteristics is required to make the diagnosis of moderate or severe Protein Calorie Malnutrition based on AND/ASPEN Guidelines):  Energy Intake:  Mild decrease in energy intake (Comment)  Weight Loss:  Unable to assess     Body Fat Loss:  Unable to assess     Muscle Mass Loss:  Unable to assess    Fluid Accumulation:  No significant fluid accumulation      NUTRITION DIAGNOSIS   Inadequate oral intake related to impaired respiratory function as evidenced by intubation    Nutrition Monitoring and Evaluation:   Food/Nutrient Intake Outcomes:  Enteral Nutrition Intake/Tolerance  Physical Signs/Symptoms Outcomes:  Biochemical Data, Nutrition Focused Physical Findings, Weight, Hemodynamic Status     OBJECTIVE DATA: Significant to nutrition assessment  Nutrition Related Findings: .   Wounds: None  Nutrition Goals: Initiate nutrition support     CURRENT NUTRITION THERAPIES  Diet NPO  PO Intake: NPO   PO Supplement Intake:NPO  Additional Sources of Calories/IVF:378 kcal via propofol     COMPARATIVE STANDARDS  Energy (kcal):  5835-1231 (12-18

## 2023-10-21 NOTE — ANESTHESIA PRE PROCEDURE
emergent       Induction: intravenous. MIPS: Postoperative opioids intended and Prophylactic antiemetics administered. Anesthetic plan and risks discussed with Unable to obtain due to emergent nature.         Attending anesthesiologist reviewed and agrees with Preprocedure content                Sonal Malcolm MD   10/21/2023

## 2023-10-21 NOTE — PROGRESS NOTES
Physical & Occupational Therapy Discharge    Pt is not medically appropriate d/t intubation and sedation. Pt to be discharged from acute PT and OT services at this time. Please re-order PT and OT consults when patient is medically appropriate.      Hammad Peoples, [address], OTR/L, [phone]

## 2023-10-21 NOTE — ED PROVIDER NOTES
IntraVENous Given 10/21/23 0335)   midazolam (VERSED) injection 4 mg (4 mg IntraVENous Given 10/21/23 0437)         PROCEDURES: (None if blank)  Intubation    Date/Time: 10/21/2023 7:30 AM    Performed by: Ramandeep Yap MD  Authorized by: Ramandeep Yap MD    Consent:     Consent obtained:  Emergent situation  Pre-procedure details:     Indications: airway protection and altered consciousness      Patient status:  Unresponsive    Look externally: large tongue      Mouth opening - incisor distance:  2 finger widths    Hyoid-mental distance: 2 finger widths      Hyoid-thyroid distance: 2 or more finger widths      Mallampati score:  III    Obstruction: none      Neck mobility: reduced      Pharmacologic strategy: RSI      Induction agents:  Etomidate    Paralytics:  Rocuronium  Procedure details:     Preoxygenation:  Nasal cannula    CPR in progress: no      Number of attempts:  1  Successful intubation attempt details:     Intubation method:  Oral    Intubation technique: video assisted      Laryngoscope blade: Mac 3    Bougie used: no      Grade view: I      Tube size (mm):  7.5    Tube type:  Cuffed    Tube visualized through cords: yes    Placement assessment:     ETT at teeth/gumline (cm):  23    Tube secured with: Adhesive tape and ETT dunham    Breath sounds:  Absent over the epigastrium and equal    Placement verification: chest rise, colorimetric ETCO2, CXR verification, esophageal detector and tube exhalation      CXR findings:  Appropriate position  Post-procedure details:     Procedure completion:  Tolerated  :     CRITICAL CARE: (None if blank)  CRITICAL CARE ADDENDUM:    CRITICAL CARE  I personally saw the patient and independently provided 45 minutes of non-concurrent critical care out of the total shared critical care time provided. This excludes seperately billable procedures.  Critical care time was provided for MCA infarct, that required close evaluation and/or intervention with concern for potential patient decompensation. DISCHARGE PRESCRIPTIONS: (None if blank)  There are no discharge medications for this patient. I am the primary clinician of record. FINAL IMPRESSION      1. Cerebrovascular accident (CVA), unspecified mechanism ([address])    2. Polysubstance abuse ([address])    3. Prolonged Q-T interval on ECG          DISPOSITION/PLAN   DISPOSITION Decision To Transfer 10/21/2023 03:48:22 AM      OUTPATIENT FOLLOW UP THE PATIENT:  No follow-up provider specified.       Electronically Signed: Lydia Boss MD, 10/21/23, 7:31 AM        Lydia Boss MD  10/21/23 4103       Lydia Boss MD  10/21/23 9717

## 2023-10-21 NOTE — CONSULTS
ICU/Internal Medicine Note    Patient Name: Deb Davila   Admit Date: 10/21/2023   Code:Full Code  PCP: No primary care provider on file. Attending: Alda Alonzo MD    Chief Complaint: No chief complaint on file. Subjective   HPI:   Deb Davila is a 40 yo female with no available documented medical history who presented from home to [facility] ED after family found her unconscious. Per ED note, her family states they believe she took multiple illicit drugs as well as large amounts of alcohol and had been sleeping for over 20 hours. EMS was called, gave Narcan with minimal response. On arrival to the ED she was nonverbal, was able to intermittently follow commands. GCS of 9 on arrival.      ED Course: Temp: 98.5 F, HR 60 bpm, /89, RR 20/min, SpO2 100% RA     Labs showed BMP WNL, CBC with WBC 12.3, Hgb 9.7, Plt 518. Trop, ProBNP both WNL. LFTS WNL. TSH 0.25. UDS positive for amphetamines and THC. VBG with pH 7.39, pCO2 44.0, Bicarb 27. Chest Xray showed mild cardiomegaly and central pulmonary congestion with no obvious infiltrate or effusion. CT Head showed large left MCA infarct, 7 mm left-to-right midline shift  CTA Head Neck showed left cervical ICA, left intracranial ICA and left M1 MCA occlusions. ED paged the stroke and neurosurgery teams, who decided on immediate transfer to [facility] for further neurocritical care. Prior to transport the patient was intubated using etomidate and rocuronium for airway protection 2/2 GCS decline with decorticate posturing. On my evaluation, patient is sedated. She localizes to pain in all extremities except RLE. She will open her eyes occassionally, not tracking. Past Medical Hx:  No past medical history on file. Past Surgical Hx:  No past surgical history on file.      Home Medication:  Prior to Admission medications    Not on File       Allergies:  No Known Allergies    Social Hx:  Social History     Socioeconomic History

## 2023-10-21 NOTE — PROGRESS NOTES
Speech-Language Pathology    Received therapy order. As pt is currently intubated and unable to participate in evaluation, will sign-off. Please re-refer when medically appropriate.     Justin Baker, [facility], [phone]  Ph. # [phone]

## 2023-10-21 NOTE — PLAN OF CARE
Neurocritical care - Plan of Care    Head CT obtained this afternoon - patient less briskly opening eyes and localizing to pain. Pupils still equal and reactive. Head CT shows increase in cerebral edema with increased shifting causing brain compression. Reviewed by neurovascular surgeon. She will require hemicraniectomy this evening.      INR 1.1 on 10/20  Plt 518  Na 148    Check type and screen  Mannitol 100g x 1 now  Nursing staff & pulm cc team updated   Family updated    ZACARIAS Michelle - CNP  10/21/23  5:36 PM

## 2023-10-21 NOTE — NURSE NAVIGATOR
Patient's chart reviewed for Stroke Core Measures and additional needs:    []   VTE prophylaxis - order for SCDs    [x]   Swallow screen prior to PO intake - intubated   [x]   Lipids / A1C ordered or resulted - ordered   [x]   Therapy ordered - intubated, hemicrani watch. Will re-refer to SLP when medically appropriate. [x]   Care plan and Education template - add    Navigator to continue to follow patient while admitted, to assist with follow up and discharge planning as needed.      Nurse eSignature: Electronically signed by Derrek Hollis RN on 10/21/23 at 4:49 PM EDT - Neuroscience Navigator

## 2023-10-22 ENCOUNTER — APPOINTMENT (OUTPATIENT)
Dept: GENERAL RADIOLOGY | Age: 43
End: 2023-10-22
Attending: INTERNAL MEDICINE
Payer: COMMERCIAL

## 2023-10-22 ENCOUNTER — APPOINTMENT (OUTPATIENT)
Dept: MRI IMAGING | Age: 43
End: 2023-10-22
Attending: INTERNAL MEDICINE
Payer: COMMERCIAL

## 2023-10-22 PROBLEM — I63.512 ACUTE ISCHEMIC LEFT MCA STROKE (HCC): Status: ACTIVE | Noted: 2023-10-22

## 2023-10-22 LAB
ANION GAP SERPL CALCULATED.3IONS-SCNC: 10 MMOL/L (ref 3–16)
ANION GAP SERPL CALCULATED.3IONS-SCNC: 11 MMOL/L (ref 3–16)
BASE EXCESS BLDA CALC-SCNC: 1.9 MMOL/L (ref -3–3)
BUN SERPL-MCNC: 12 MG/DL (ref 7–20)
BUN SERPL-MCNC: 12 MG/DL (ref 7–20)
CALCIUM SERPL-MCNC: 8.1 MG/DL (ref 8.3–10.6)
CALCIUM SERPL-MCNC: 8.2 MG/DL (ref 8.3–10.6)
CHLORIDE SERPL-SCNC: 116 MMOL/L (ref 99–110)
CHLORIDE SERPL-SCNC: 117 MMOL/L (ref 99–110)
CO2 BLDA-SCNC: 27 MMOL/L
CO2 SERPL-SCNC: 23 MMOL/L (ref 21–32)
CO2 SERPL-SCNC: 25 MMOL/L (ref 21–32)
COHGB MFR BLDA: 1.5 % (ref 0–1.5)
CREAT SERPL-MCNC: 0.7 MG/DL (ref 0.6–1.1)
CREAT SERPL-MCNC: 0.7 MG/DL (ref 0.6–1.1)
DEPRECATED RDW RBC AUTO: 18.5 % (ref 12.4–15.4)
GFR SERPLBLD CREATININE-BSD FMLA CKD-EPI: >60 ML/MIN/{1.73_M2}
GFR SERPLBLD CREATININE-BSD FMLA CKD-EPI: >60 ML/MIN/{1.73_M2}
GLUCOSE SERPL-MCNC: 133 MG/DL (ref 70–99)
GLUCOSE SERPL-MCNC: 140 MG/DL (ref 70–99)
HCO3 BLDA-SCNC: 26 MMOL/L (ref 21–29)
HCT VFR BLD AUTO: 26.8 % (ref 36–48)
HGB BLD-MCNC: 8.1 G/DL (ref 12–16)
HGB BLDA-MCNC: 8.5 G/DL
INR PPP: 1.27 (ref 0.84–1.16)
MCH RBC QN AUTO: 20.6 PG (ref 26–34)
MCHC RBC AUTO-ENTMCNC: 30.3 G/DL (ref 31–36)
MCV RBC AUTO: 68 FL (ref 80–100)
METHGB MFR BLDA: 0.3 % (ref 0–1.4)
PCO2 BLDA: 36.7 MMHG (ref 35–45)
PH BLDA: 7.46 [PH] (ref 7.35–7.45)
PLATELET # BLD AUTO: 417 K/UL (ref 135–450)
PMV BLD AUTO: 7.2 FL (ref 5–10.5)
PO2 BLDA: 67.8 MMHG (ref 75–108)
POTASSIUM SERPL-SCNC: 3.6 MMOL/L (ref 3.5–5.1)
POTASSIUM SERPL-SCNC: 3.8 MMOL/L (ref 3.5–5.1)
PROTHROMBIN TIME: 15.9 SEC (ref 11.5–14.8)
RBC # BLD AUTO: 3.95 M/UL (ref 4–5.2)
REASON FOR REJECTION: NORMAL
REJECTED TEST: NORMAL
SAO2 % BLDA: 95 % (ref 93–100)
SODIUM SERPL-SCNC: 146 MMOL/L (ref 136–145)
SODIUM SERPL-SCNC: 148 MMOL/L (ref 136–145)
SODIUM SERPL-SCNC: 150 MMOL/L (ref 136–145)
SODIUM SERPL-SCNC: 151 MMOL/L (ref 136–145)
SODIUM SERPL-SCNC: 152 MMOL/L (ref 136–145)
SODIUM SERPL-SCNC: 153 MMOL/L (ref 136–145)
WBC # BLD AUTO: 15.4 K/UL (ref 4–11)

## 2023-10-22 PROCEDURE — 2500000003 HC RX 250 WO HCPCS

## 2023-10-22 PROCEDURE — 94003 VENT MGMT INPAT SUBQ DAY: CPT

## 2023-10-22 PROCEDURE — 85610 PROTHROMBIN TIME: CPT

## 2023-10-22 PROCEDURE — 87070 CULTURE OTHR SPECIMN AEROBIC: CPT

## 2023-10-22 PROCEDURE — 6370000000 HC RX 637 (ALT 250 FOR IP)

## 2023-10-22 PROCEDURE — 94761 N-INVAS EAR/PLS OXIMETRY MLT: CPT

## 2023-10-22 PROCEDURE — 2700000000 HC OXYGEN THERAPY PER DAY

## 2023-10-22 PROCEDURE — 2000000000 HC ICU R&B

## 2023-10-22 PROCEDURE — 87075 CULTR BACTERIA EXCEPT BLOOD: CPT

## 2023-10-22 PROCEDURE — 2580000003 HC RX 258: Performed by: NURSE PRACTITIONER

## 2023-10-22 PROCEDURE — 87205 SMEAR GRAM STAIN: CPT

## 2023-10-22 PROCEDURE — A4216 STERILE WATER/SALINE, 10 ML: HCPCS

## 2023-10-22 PROCEDURE — 70551 MRI BRAIN STEM W/O DYE: CPT

## 2023-10-22 PROCEDURE — 2580000003 HC RX 258

## 2023-10-22 PROCEDURE — 85027 COMPLETE CBC AUTOMATED: CPT

## 2023-10-22 PROCEDURE — 36415 COLL VENOUS BLD VENIPUNCTURE: CPT

## 2023-10-22 PROCEDURE — 82803 BLOOD GASES ANY COMBINATION: CPT

## 2023-10-22 PROCEDURE — 6360000002 HC RX W HCPCS

## 2023-10-22 PROCEDURE — 99291 CRITICAL CARE FIRST HOUR: CPT | Performed by: NURSE PRACTITIONER

## 2023-10-22 PROCEDURE — 99291 CRITICAL CARE FIRST HOUR: CPT | Performed by: INTERNAL MEDICINE

## 2023-10-22 PROCEDURE — 84295 ASSAY OF SERUM SODIUM: CPT

## 2023-10-22 PROCEDURE — 6360000002 HC RX W HCPCS: Performed by: STUDENT IN AN ORGANIZED HEALTH CARE EDUCATION/TRAINING PROGRAM

## 2023-10-22 PROCEDURE — 80048 BASIC METABOLIC PNL TOTAL CA: CPT

## 2023-10-22 PROCEDURE — 2580000003 HC RX 258: Performed by: STUDENT IN AN ORGANIZED HEALTH CARE EDUCATION/TRAINING PROGRAM

## 2023-10-22 PROCEDURE — 74018 RADEX ABDOMEN 1 VIEW: CPT

## 2023-10-22 PROCEDURE — 6370000000 HC RX 637 (ALT 250 FOR IP): Performed by: STUDENT IN AN ORGANIZED HEALTH CARE EDUCATION/TRAINING PROGRAM

## 2023-10-22 RX ORDER — 3% SODIUM CHLORIDE 3 G/100ML
25 INJECTION, SOLUTION INTRAVENOUS CONTINUOUS
Status: DISCONTINUED | OUTPATIENT
Start: 2023-10-22 | End: 2023-10-23

## 2023-10-22 RX ORDER — HYDRALAZINE HYDROCHLORIDE 20 MG/ML
INJECTION INTRAMUSCULAR; INTRAVENOUS
Status: COMPLETED
Start: 2023-10-22 | End: 2023-10-22

## 2023-10-22 RX ORDER — METOPROLOL TARTRATE 50 MG/1
50 TABLET, FILM COATED ORAL 2 TIMES DAILY
Status: DISCONTINUED | OUTPATIENT
Start: 2023-10-22 | End: 2023-10-23

## 2023-10-22 RX ORDER — HYDRALAZINE HYDROCHLORIDE 20 MG/ML
10 INJECTION INTRAMUSCULAR; INTRAVENOUS EVERY 6 HOURS PRN
Status: DISCONTINUED | OUTPATIENT
Start: 2023-10-22 | End: 2023-11-04 | Stop reason: HOSPADM

## 2023-10-22 RX ORDER — 3% SODIUM CHLORIDE 3 G/100ML
25 INJECTION, SOLUTION INTRAVENOUS CONTINUOUS
Status: DISCONTINUED | OUTPATIENT
Start: 2023-10-22 | End: 2023-10-22

## 2023-10-22 RX ADMIN — SODIUM CHLORIDE, PRESERVATIVE FREE 20 MG: 5 INJECTION INTRAVENOUS at 09:06

## 2023-10-22 RX ADMIN — SODIUM CHLORIDE 15 MG/HR: 9 INJECTION, SOLUTION INTRAVENOUS at 14:03

## 2023-10-22 RX ADMIN — METOPROLOL TARTRATE 50 MG: 50 TABLET, FILM COATED ORAL at 21:06

## 2023-10-22 RX ADMIN — SODIUM CHLORIDE 12.5 MG/HR: 9 INJECTION, SOLUTION INTRAVENOUS at 18:39

## 2023-10-22 RX ADMIN — PROPOFOL 25 MCG/KG/MIN: 10 INJECTION, EMULSION INTRAVENOUS at 14:04

## 2023-10-22 RX ADMIN — ATORVASTATIN CALCIUM 80 MG: 80 TABLET, FILM COATED ORAL at 01:18

## 2023-10-22 RX ADMIN — LABETALOL HYDROCHLORIDE 10 MG: 5 INJECTION, SOLUTION INTRAVENOUS at 03:33

## 2023-10-22 RX ADMIN — SODIUM CHLORIDE 25 ML/HR: 3 INJECTION, SOLUTION INTRAVENOUS at 12:46

## 2023-10-22 RX ADMIN — SODIUM CHLORIDE 15 MG/HR: 9 INJECTION, SOLUTION INTRAVENOUS at 03:24

## 2023-10-22 RX ADMIN — CEFAZOLIN 2000 MG: 2 INJECTION, POWDER, FOR SOLUTION INTRAMUSCULAR; INTRAVENOUS at 12:31

## 2023-10-22 RX ADMIN — SODIUM CHLORIDE 15 MG/HR: 9 INJECTION, SOLUTION INTRAVENOUS at 07:35

## 2023-10-22 RX ADMIN — SODIUM CHLORIDE, PRESERVATIVE FREE 20 MG: 5 INJECTION INTRAVENOUS at 21:06

## 2023-10-22 RX ADMIN — SODIUM CHLORIDE 15 MG/HR: 9 INJECTION, SOLUTION INTRAVENOUS at 11:53

## 2023-10-22 RX ADMIN — CHLORHEXIDINE GLUCONATE 15 ML: 1.2 RINSE ORAL at 09:06

## 2023-10-22 RX ADMIN — LABETALOL HYDROCHLORIDE 10 MG: 5 INJECTION, SOLUTION INTRAVENOUS at 04:54

## 2023-10-22 RX ADMIN — CHLORHEXIDINE GLUCONATE 15 ML: 1.2 RINSE ORAL at 01:18

## 2023-10-22 RX ADMIN — Medication 25 MCG/HR: at 04:17

## 2023-10-22 RX ADMIN — PROPOFOL 20 MCG/KG/MIN: 10 INJECTION, EMULSION INTRAVENOUS at 03:17

## 2023-10-22 RX ADMIN — ATORVASTATIN CALCIUM 80 MG: 80 TABLET, FILM COATED ORAL at 21:06

## 2023-10-22 RX ADMIN — SODIUM CHLORIDE 15 MG/HR: 9 INJECTION, SOLUTION INTRAVENOUS at 17:01

## 2023-10-22 RX ADMIN — SODIUM CHLORIDE, PRESERVATIVE FREE 10 ML: 5 INJECTION INTRAVENOUS at 01:18

## 2023-10-22 RX ADMIN — CHLORHEXIDINE GLUCONATE 15 ML: 1.2 RINSE ORAL at 21:06

## 2023-10-22 RX ADMIN — METOPROLOL TARTRATE 50 MG: 50 TABLET, FILM COATED ORAL at 09:14

## 2023-10-22 RX ADMIN — SODIUM CHLORIDE 55 ML/HR: 3 INJECTION, SOLUTION INTRAVENOUS at 03:25

## 2023-10-22 RX ADMIN — SODIUM CHLORIDE 15 MG/HR: 9 INJECTION, SOLUTION INTRAVENOUS at 05:26

## 2023-10-22 RX ADMIN — SODIUM CHLORIDE, PRESERVATIVE FREE 20 MG: 5 INJECTION INTRAVENOUS at 01:18

## 2023-10-22 RX ADMIN — SODIUM CHLORIDE 15 MG/HR: 9 INJECTION, SOLUTION INTRAVENOUS at 09:45

## 2023-10-22 RX ADMIN — SODIUM CHLORIDE, PRESERVATIVE FREE 10 ML: 5 INJECTION INTRAVENOUS at 21:06

## 2023-10-22 RX ADMIN — SODIUM CHLORIDE, PRESERVATIVE FREE 10 ML: 5 INJECTION INTRAVENOUS at 09:06

## 2023-10-22 RX ADMIN — CEFAZOLIN 2000 MG: 2 INJECTION, POWDER, FOR SOLUTION INTRAMUSCULAR; INTRAVENOUS at 21:04

## 2023-10-22 RX ADMIN — SODIUM CHLORIDE 7.5 MG/HR: 9 INJECTION, SOLUTION INTRAVENOUS at 21:32

## 2023-10-22 RX ADMIN — SODIUM CHLORIDE 15 MG/HR: 9 INJECTION, SOLUTION INTRAVENOUS at 01:28

## 2023-10-22 RX ADMIN — CEFAZOLIN 2000 MG: 2 INJECTION, POWDER, FOR SOLUTION INTRAMUSCULAR; INTRAVENOUS at 03:31

## 2023-10-22 RX ADMIN — HYDRALAZINE HYDROCHLORIDE 10 MG: 20 INJECTION INTRAMUSCULAR; INTRAVENOUS at 02:53

## 2023-10-22 RX ADMIN — PROPOFOL 25 MCG/KG/MIN: 10 INJECTION, EMULSION INTRAVENOUS at 09:03

## 2023-10-22 RX ADMIN — HYDRALAZINE HYDROCHLORIDE 10 MG: 20 INJECTION, SOLUTION INTRAMUSCULAR; INTRAVENOUS at 02:53

## 2023-10-22 RX ADMIN — SENNOSIDES 8.6 MG: 8.6 TABLET, FILM COATED ORAL at 01:18

## 2023-10-22 ASSESSMENT — PULMONARY FUNCTION TESTS
PIF_VALUE: 11
PIF_VALUE: 15
PIF_VALUE: 11

## 2023-10-22 NOTE — PLAN OF CARE
Problem: Discharge Planning  Goal: Discharge to home or other facility with appropriate resources  Outcome: Progressing  Flowsheets (Taken 10/21/2023 2200)  Discharge to home or other facility with appropriate resources: Identify barriers to discharge with patient and caregiver     Problem: Pain  Goal: Verbalizes/displays adequate comfort level or baseline comfort level  Outcome: Progressing     Problem: Skin/Tissue Integrity  Goal: Absence of new skin breakdown  Description: 1. Monitor for areas of redness and/or skin breakdown  2. Assess vascular access sites hourly  3. Every 4-6 hours minimum:  Change oxygen saturation probe site  4. Every 4-6 hours:  If on nasal continuous positive airway pressure, respiratory therapy assess nares and determine need for appliance change or resting period. Outcome: Progressing     Problem: Safety - Adult  Goal: Free from fall injury  Outcome: Progressing  Flowsheets (Taken 10/21/2023 2200)  Free From Fall Injury: Instruct family/caregiver on patient safety     Problem: Nutrition Deficit:  Goal: Optimize nutritional status  Outcome: Progressing     Problem: Neurosensory - Adult  Goal: Achieves stable or improved neurological status  Outcome: Progressing  Goal: Achieves maximal functionality and self care  Outcome: Progressing     Problem: Chronic Conditions and Co-morbidities  Goal: Patient's chronic conditions and co-morbidity symptoms are monitored and maintained or improved  Outcome: Progressing  Flowsheets (Taken 10/21/2023 2200)  Care Plan - Patient's Chronic Conditions and Co-Morbidity Symptoms are Monitored and Maintained or Improved: Monitor and assess patient's chronic conditions and comorbid symptoms for stability, deterioration, or improvement     Problem: Safety - Medical Restraint  Goal: Remains free of injury from restraints (Restraint for Interference with Medical Device)  Description: INTERVENTIONS:  1.  Determine that other, less restrictive measures have

## 2023-10-22 NOTE — PROGRESS NOTES
Bone flap was prepared for storage per instructions and placed in freezer by Abby Pisano RN on 10/21/2023 at 21:15.

## 2023-10-22 NOTE — PROGRESS NOTES
Neuro exam: Pt now on 25 mcg/kg/min Propofol and 25 mcg/hr of fentanyl. On this much sedation, pt occasionally opens eyes to voice and always opens eyes to pain. Pupils remain equal and reactive. RUE and RLE move to pain. No movement noted in L side, however painful stimulus in this side elicits movement on R side. Surgical site dressing is clean, dry and intact. GT drain has had 100 ml sanguinous output so far this shift. Blood pressure: SBP goal < 140. Pt is maxed on nicardipine at 15 mg/hr, and has received PRN hydralazine x 1 and labetalol x 2. BP is now at goal.   Respiratory: FiO2 30%, Vt 450, RR 16, PEEP 5. Lung sounds are diminished.

## 2023-10-22 NOTE — PROGRESS NOTES
Serum sodium at 0039: 146, no rate change of 3% NS. Serum sodium at 0239: 148, no rate change of 3% NS.

## 2023-10-22 NOTE — PROGRESS NOTES
V2.0    [facility] [place]      Name:  Griffin Urbina /Age/Sex: 1980  (41 y.o. female)   MRN & CSN:  [id number] & [id number] Encounter Date/Time: 10/22/2023 8:03 AM EDT   Location:  [facility] PCP: No primary care provider on file. Attending:Manuel Ramos MD       Hospital Day: 2    Assessment and Recommendations       Assessment/Plan:     Acute encephalopathy  Large vessel occlusion CVA status post hemicraniectomy for cerebral edema with midline shift:  Followed by neurosurgery and critical care team    Acute respiratory failure:  Her mental status decline and need to protect her airway  Early on FiO2 30% managed by critical care team    Microcytic anemia:  Trend hemoglobin    Polysubstance abuse disorder:  Positive for THC amphetamines  Noted in the past she has used ecstasy opioids and alcohol      Diet Diet NPO   DVT Prophylaxis [] Lovenox, []  Heparin, [] SCDs, [] Ambulation,  [] Eliquis, [] Xarelto  [] Coumadin   Code Status Full Code       Surrogate Decision Maker/ POA       Personally reviewed Lab Studies and Imaging     Personally reviewed progress note from the ICU team                Subjective:     Chief Complaint: Altered mental status    Griffin Urbina is a 41 y.o. female who presents with CVA patient currently on vent unable to give any history      Review of Systems:      Pertinent positives and negatives discussed in HPI    Objective:      Intake/Output Summary (Last 24 hours) at 10/22/2023 0803  Last data filed at 10/22/2023 0721  Gross per 24 hour   Intake 3923.1 ml   Output 2825 ml   Net 1098.1 ml      Vitals:   Vitals:    10/22/23 0630 10/22/23 0645 10/22/23 0700 10/22/23 0715   BP: (!) 140/69 137/71 (!) 143/68 (!) 144/68   Pulse: 83 75 79 77   Resp:    Temp:       TempSrc:       SpO2: 96% 98% 96% 96%         Physical Exam:      General: Female on vent  Eyes: Nonicteric   Head exam: Craniectomy with staples noted  Drain in place  ENT: ET 10/21/2023  PROCEDURE: CT head without contrast 2216 hours INDICATION: post op; COMPARISON: 1648 hours TECHNIQUE: CT head was performed without contrast according to standard protocol. Axial images and multiplanar reformatted images reviewed. Up-to-date CT equipment and radiation dose reduction techniques were employed. FINDINGS: Patient status post left hemicraniectomy. There is significantly decreased 1 mm rightward midline shift. Evolving large left MCA territory infarct and hyperdense left MCA are redemonstrated. The ventricles are nondilated. The basilar cisterns are patent. Visualized portions of the orbits, paranasal sinuses, and mastoids appear normal. No acute fracture is identified. 1.  Status post left hemicraniectomy without evidence of complication. Decreased 1 mm rightward midline shift. CT HEAD WO CONTRAST    Result Date: 10/21/2023  PROCEDURE: CT head without contrast 1645 hours INDICATION: f/u stroke; COMPARISON: 0222 hours TECHNIQUE: CT head was performed without contrast according to standard protocol. Axial images and multiplanar reformatted images reviewed. Up-to-date CT equipment and radiation dose reduction techniques were employed. FINDINGS: There is no evidence of acute or cranial hemorrhage. The expected evolution of a large left MCA territory infarct with persistent hyperdensity of the left MCA consistent with thrombosis as seen on recent CTA. There is increased cytotoxic edema with increased 8 mm rightward midline shift, previously 5 mm remeasured. There is suspected mild left uncal herniation. The ventricles are nondilated. Visualized portions of the orbits, paranasal sinuses, and mastoids appear normal. No acute fracture is identified. 1.  Expected evolution of large left MCA territory infarct. No acute hemorrhage. 2.  Persistent hyperdense left MCA consistent with thrombosis as seen on CTA.  3.  Increased 8 mm rightward midline shift, previously 5 mm 14 hours prior, and

## 2023-10-22 NOTE — PROGRESS NOTES
Pt returned to room 4515 from OR, then taken down to CT accompanied by RCP and this RN. Per anesthesia team, pt received paralytics en route so unable to obtain proper neuro assessment at this time. See pupillometer readings.

## 2023-10-22 NOTE — BRIEF OP NOTE
Brief Postoperative Note      Patient: Radha Garnica  YOB: 1980  MRN: [id number]    Date of Procedure: 10/21/2023    Pre-Op Diagnosis Codes:     * Left middle cerebral artery stroke ([address]) [I63.512]    Post-Op Diagnosis: Same       Procedure(s):  LEFT DECOMPRESSIVE FLORA-CRANIECTOMY    Surgeon(s):  Alyssia Saeed MD    Assistant:  Surgical Assistant: Genna Henry    Anesthesia: General    Estimated Blood Loss (mL): 731     Complications: None    Specimens:   ID Type Source Tests Collected by Time Destination   1 : BONE FLAP CULTURE Body Fluid Fluid CULTURE, FUNGUS, CULTURE, BODY FLUID, CULTURE, ANAEROBIC AND AEROBIC Alyssia Saeed MD 10/21/2023 2045        Implants:  Implant Name Type Inv. Item Serial No.  Lot No. LRB No. Used Action   GRAFT DURA [id number] ULTRAPURE REGEN CLLGN MTRX Yolanda Baseman - [id number] Dura GRAFT DURA Aditi Shearing ULTRAPURE REGEN CLLGN MTRX ABSRB DURAGN  [employer] [facility]- [id number] Left 1 Implanted   DURA-GUARD Dura    [id number] Left 1 Implanted         Drains:   Closed/Suction Drain Left Scalp Bulb (Active)   Site Description Clean, dry & intact 10/21/23 2049   Dressing Status New dressing applied;Clean, dry & intact 10/21/23 2049   Drain Status To bulb suction 10/21/23 2049       Urinary Catheter 10/21/23 2 Way; [place] (Active)   Urine Color Yellow 10/21/23 2050   Urine Appearance Clear 10/21/23 2050   Collection Container Standard 10/21/23 2050   Securement Method Securing device (Describe) 10/21/23 2050   Catheter Best Practices  Drainage tube clipped to bed;Catheter secured to thigh; Tamper seal intact; Bag below bladder;Drainage bag less than half full;Lack of dependent loop in tubing;Bag not on floor 10/21/23 2050   Status Draining 10/21/23 2050       [REMOVED] Urinary Catheter 10/21/23 (Removed)   Catheter Indications Perioperative use for selected surgical procedures 10/21/23 0500   Site Assessment No urethral drainage 10/21/23 0500 Urine Color Yellow 10/21/23 0500   Urine Appearance Clear 10/21/23 0500   Collection Container Standard 10/21/23 0500   Securement Method Securing device (Describe) 10/21/23 0500   Catheter Care  Soap and water 10/21/23 0500   Catheter Best Practices  Catheter secured to thigh;Drainage tube clipped to bed; Tamper seal intact; Bag below bladder;Bag not on floor; Lack of dependent loop in tubing;Drainage bag less than half full 10/21/23 0500   Status Patent;Draining 10/21/23 0500       Findings:   -left decompressive hemicraniectomy. 1 sarthak drain. Skin closed with staples.       Electronically signed by Alyssia Saeed MD on 10/21/2023 at 9:02 PM

## 2023-10-22 NOTE — PROGRESS NOTES
3:35 PM    ICU Patients:   [facility]: [address]    Floor / PCU Patients:  [facility]: [facility]    Critical Care:  Due to the immediate potential for life-threatening deterioration due to neurological failure, I spent 48 minutes providing critical care. This time excludes time spent performing procedures but includes time spent on direct patient care, history retrieval, review of the chart, and discussions with patient, family, and consultant(s).

## 2023-10-22 NOTE — PROGRESS NOTES
38.7   PO2ART 113.9*   QGG4IGB 24.9   BEART 0   [id number] 99   [id number] 26       INR:   Recent Labs     10/20/23  2325   INR 1.11     Lactate:   Recent Labs     10/21/23  1126   LACTATE 0.68     Cultures:  -----------------------------------------------------------------  RAD:   XR ABDOMEN (KUB) (SINGLE AP VIEW)   Final Result      Recommend advancing nasogastric tube an additional 4-5 cm for more optimal   positioning. CT HEAD WO CONTRAST   Final Result      1. Status post left hemicraniectomy without evidence of complication. Decreased   1 mm rightward midline shift. CT HEAD WO CONTRAST   Final Result      1. Expected evolution of large left MCA territory infarct. No acute hemorrhage. 2.  Persistent hyperdense left MCA consistent with thrombosis as seen on CTA. 3.  Increased 8 mm rightward midline shift, previously 5 mm 14 hours prior, and   suspected mild left uncal herniation. Recommend neurosurgical consultation. Discussed with charge nurse. XR CHEST PORTABLE   Final Result      Asymmetric breast shadow over the left chest with no definite underlying left   lung airspace disease. Stable mild cardiomegaly. MRI BRAIN WO CONTRAST    (Results Pending)         Assessment/Plan:   Lennox Shearer is a 41 y.o. female with no known history who was transferred from [facility] ED after being diagnosed with L MCA infarct and occlusion of L ICA and L MCA.     Acute encephalopathy  Large vessel occlusion CVA, Left ICA and Left MCA  S/p Hemicraniectomy for cerebral edema with midline shift  - POD #1 s/p hemicraniectomy   - Previous head CT showing large MCA distribution CVA  - NCC and NSG consulted, appreciate recs   - ECHO ordered   - HbA1c, lipid panel, FLP ordered  - MRI ordered for this afternoon  - SBP < 140, nicardipine gtt and PRN hydralazine and labetalol ordered  - Starting Lopressor 50 mg BID this morning  - 3% HTS gtt ended this morning, will await further recommendations from but not limited to examining patient, collaborating with other physicians, monitoring vital signs, telemetry, continuous pulse oximetry, and clinical response to IV medications, documentation time, review and interpretation of laboratory and radiological data, review of nursing notes and old record review. This time excludes any time that may have been spent performing procedures for life threatening organ failure.      Pavithra Boles MD

## 2023-10-22 NOTE — OP NOTE
Operative Note      Patient: Lynn Carrel  YOB: 1980  MRN: [id number]    Date of Procedure: 10/21/2023    Pre-Op Diagnosis Codes:     * Left middle cerebral artery stroke ([address]) [I63.512]    Post-Op Diagnosis: Same       Procedure(s):  LEFT DECOMPRESSIVE FLORA-CRANIECTOMY    Surgeon(s):  Jerome Hand MD    Assistant:   Surgical Assistant: Jignesh Jackson    Anesthesia: General    Estimated Blood Loss (mL): 452     Complications: None    Specimens:   ID Type Source Tests Collected by Time Destination   1 : BONE FLAP CULTURE Body Fluid Fluid CULTURE, FUNGUS, CULTURE, BODY FLUID (Canceled), CULTURE, ANAEROBIC AND AEROBIC Jerome Hand MD 10/21/2023 2045        Implants:  Implant Name Type Inv. Item Serial No.  Lot No. LRB No. Used Action   GRAFT DURA [id number] ULTRAPURE REGEN CLLGN MTRX ABSRB DURAGN - [id number] Dura GRAFT DURA Henrine Razo ULTRAPURE REGEN CLLGN MTRX ABSRB DURAGN  [employer] [facility]- [id number] Left 1 Implanted   DURA-GUARD Dura    [id number] Left 1 Implanted         Drains:   Closed/Suction Drain Left Scalp Bulb (Active)   Site Description Clean, dry & intact 10/22/23 0400   Dressing Status Clean, dry & intact 10/22/23 0400   Drainage Appearance Bloody 10/22/23 0400   Drain Status To bulb suction; Compressed 10/22/23 0400   Output (ml) 40 ml 10/22/23 0721       NG/OG/NJ/NE Tube Orogastric Center mouth (Active)   Surrounding Skin Clean, dry & intact 10/22/23 0058   Securement device Other (comment) 10/22/23 0058   Status Clamped 10/22/23 0058   Placement Verified X-Ray (Initial); External Catheter Length 10/22/23 0058   NG/OG/NJ/NE External Measurement (cm) 55 cm 10/22/23 0058   Tube Feeding Intake (mL) 80 ml 10/22/23 0721       Urinary Catheter 10/21/23 2 Way; [place]rellana (Active)   Catheter Indications Perioperative use for selected surgical procedures 10/22/23 0400   Site Assessment No urethral drainage 10/22/23 0400   Urine Color Yellow 10/22/23 0400   Urine certify that I was present throughout the entire procedure. Details of Procedure: The patient was brought to the operating room, already intubated, and general anesthesia was begun. The patient was positioned supine with a large gel bump under the shoulder. The head was placed in a Mann horse-shoe head dunham. The hair was clipped and the scalp was cleaned with alcohol soaked gauze. The incision was planned. Imaging was reviewed and appropriate site was confirmed. Time-out was done. The skin was infiltrated with 1% lidocaine. The scalp was prepped with chloraprep. The head was draped with sterile towels, Ioban, and a standard drape. The scalp was incised with a #10 blade. Hemostasis was maintained with Claudia clips. The scalp was mobilized anteriorly. The scalp incision continued inferiorly to the zygomatic root. Monopolar electrocautery was used to extend the opening through the temporalis muscle. The scalp and temporalis were elevated from the skull and mobilized anteriorly, and reflected with fishhooks. With the high speed drill, three constance holes were made, over the parietal region, the frontal keyhole, and above the zygomatic root. A craniotomy was turned and the skull flap was set aside for sterile storage. Epidural hemostasis was achieved. A rongeur was used to further decompress the squamous temporal bone to decompress the temporal lobe. Thrombin hemostatic matrix and fibrillar oxidized cellulose was used for light packing to maintain epidural hemostasis. Next, the dura was opened with 11 blade scalpel. The dural opening was extended in stellate fashion with scissors. The dural flaps were reflected away circumferentially, and the brain slightly herniated through the dura. The brain was irrigated with warm saline. We prepared to close. The brain surface was covered with Duragen dural substitute. The dural flaps were replaced over the [address] [place] [address].  Epidural hemostasis was again confirmed,

## 2023-10-23 LAB
ANION GAP SERPL CALCULATED.3IONS-SCNC: 8 MMOL/L (ref 3–16)
BASE EXCESS BLDA CALC-SCNC: 1 MMOL/L (ref -3–3)
BUN SERPL-MCNC: 17 MG/DL (ref 7–20)
CALCIUM SERPL-MCNC: 8.2 MG/DL (ref 8.3–10.6)
CHLORIDE SERPL-SCNC: 121 MMOL/L (ref 99–110)
CO2 BLDA-SCNC: 27 MMOL/L
CO2 SERPL-SCNC: 25 MMOL/L (ref 21–32)
CREAT SERPL-MCNC: 0.8 MG/DL (ref 0.6–1.1)
DEPRECATED RDW RBC AUTO: 18.6 % (ref 12.4–15.4)
GFR SERPLBLD CREATININE-BSD FMLA CKD-EPI: >60 ML/MIN/{1.73_M2}
GLUCOSE SERPL-MCNC: 116 MG/DL (ref 70–99)
HCO3 BLDA-SCNC: 25.4 MMOL/L (ref 21–29)
HCT VFR BLD AUTO: 26.3 % (ref 36–48)
HGB BLD-MCNC: 7.7 G/DL (ref 12–16)
MCH RBC QN AUTO: 20.4 PG (ref 26–34)
MCHC RBC AUTO-ENTMCNC: 29.3 G/DL (ref 31–36)
MCV RBC AUTO: 69.6 FL (ref 80–100)
PATH INTERP BLD-IMP: NORMAL
PCO2 BLDA: 38.5 MM HG (ref 35–45)
PERFORMED ON: NORMAL
PH BLDA: 7.43 [PH] (ref 7.35–7.45)
PLATELET # BLD AUTO: 364 K/UL (ref 135–450)
PMV BLD AUTO: 7.3 FL (ref 5–10.5)
PO2 BLDA: 82.5 MM HG (ref 75–108)
POC SAMPLE TYPE: NORMAL
POTASSIUM SERPL-SCNC: 3.6 MMOL/L (ref 3.5–5.1)
RBC # BLD AUTO: 3.78 M/UL (ref 4–5.2)
SAO2 % BLDA: 96 % (ref 93–100)
SODIUM SERPL-SCNC: 152 MMOL/L (ref 136–145)
SODIUM SERPL-SCNC: 153 MMOL/L (ref 136–145)
SODIUM SERPL-SCNC: 154 MMOL/L (ref 136–145)
SODIUM SERPL-SCNC: 155 MMOL/L (ref 136–145)
SODIUM SERPL-SCNC: 156 MMOL/L (ref 136–145)
TRIGL SERPL-MCNC: 187 MG/DL (ref 0–150)
WBC # BLD AUTO: 18.8 K/UL (ref 4–11)

## 2023-10-23 PROCEDURE — 81241 F5 GENE: CPT

## 2023-10-23 PROCEDURE — 5A09457 ASSISTANCE WITH RESPIRATORY VENTILATION, 24-96 CONSECUTIVE HOURS, CONTINUOUS POSITIVE AIRWAY PRESSURE: ICD-10-PCS | Performed by: INTERNAL MEDICINE

## 2023-10-23 PROCEDURE — 85730 THROMBOPLASTIN TIME PARTIAL: CPT

## 2023-10-23 PROCEDURE — 80061 LIPID PANEL: CPT

## 2023-10-23 PROCEDURE — 2500000003 HC RX 250 WO HCPCS

## 2023-10-23 PROCEDURE — 85301 ANTITHROMBIN III ANTIGEN: CPT

## 2023-10-23 PROCEDURE — 86146 BETA-2 GLYCOPROTEIN ANTIBODY: CPT

## 2023-10-23 PROCEDURE — 99291 CRITICAL CARE FIRST HOUR: CPT

## 2023-10-23 PROCEDURE — 85303 CLOT INHIBIT PROT C ACTIVITY: CPT

## 2023-10-23 PROCEDURE — A4216 STERILE WATER/SALINE, 10 ML: HCPCS

## 2023-10-23 PROCEDURE — 84295 ASSAY OF SERUM SODIUM: CPT

## 2023-10-23 PROCEDURE — 82803 BLOOD GASES ANY COMBINATION: CPT

## 2023-10-23 PROCEDURE — 2580000003 HC RX 258

## 2023-10-23 PROCEDURE — 86147 CARDIOLIPIN ANTIBODY EA IG: CPT

## 2023-10-23 PROCEDURE — 36415 COLL VENOUS BLD VENIPUNCTURE: CPT

## 2023-10-23 PROCEDURE — 94799 UNLISTED PULMONARY SVC/PX: CPT

## 2023-10-23 PROCEDURE — 85306 CLOT INHIBIT PROT S FREE: CPT

## 2023-10-23 PROCEDURE — 81291 MTHFR GENE: CPT

## 2023-10-23 PROCEDURE — C1751 CATH, INF, PER/CENT/MIDLINE: HCPCS

## 2023-10-23 PROCEDURE — 36569 INSJ PICC 5 YR+ W/O IMAGING: CPT

## 2023-10-23 PROCEDURE — 6360000002 HC RX W HCPCS

## 2023-10-23 PROCEDURE — 99291 CRITICAL CARE FIRST HOUR: CPT | Performed by: INTERNAL MEDICINE

## 2023-10-23 PROCEDURE — 05HA33Z INSERTION OF INFUSION DEVICE INTO LEFT BRACHIAL VEIN, PERCUTANEOUS APPROACH: ICD-10-PCS | Performed by: STUDENT IN AN ORGANIZED HEALTH CARE EDUCATION/TRAINING PROGRAM

## 2023-10-23 PROCEDURE — 6370000000 HC RX 637 (ALT 250 FOR IP)

## 2023-10-23 PROCEDURE — 83036 HEMOGLOBIN GLYCOSYLATED A1C: CPT

## 2023-10-23 PROCEDURE — 87185 SC STD ENZYME DETCJ PER NZM: CPT

## 2023-10-23 PROCEDURE — 85240 CLOT FACTOR VIII AHG 1 STAGE: CPT

## 2023-10-23 PROCEDURE — 2700000000 HC OXYGEN THERAPY PER DAY

## 2023-10-23 PROCEDURE — 6370000000 HC RX 637 (ALT 250 FOR IP): Performed by: INTERNAL MEDICINE

## 2023-10-23 PROCEDURE — 6370000000 HC RX 637 (ALT 250 FOR IP): Performed by: STUDENT IN AN ORGANIZED HEALTH CARE EDUCATION/TRAINING PROGRAM

## 2023-10-23 PROCEDURE — 83090 ASSAY OF HOMOCYSTEINE: CPT

## 2023-10-23 PROCEDURE — 85027 COMPLETE CBC AUTOMATED: CPT

## 2023-10-23 PROCEDURE — 80048 BASIC METABOLIC PNL TOTAL CA: CPT

## 2023-10-23 PROCEDURE — 94003 VENT MGMT INPAT SUBQ DAY: CPT

## 2023-10-23 PROCEDURE — 94761 N-INVAS EAR/PLS OXIMETRY MLT: CPT

## 2023-10-23 PROCEDURE — 89220 SPUTUM SPECIMEN COLLECTION: CPT

## 2023-10-23 PROCEDURE — 85300 ANTITHROMBIN III ACTIVITY: CPT

## 2023-10-23 PROCEDURE — 87205 SMEAR GRAM STAIN: CPT

## 2023-10-23 PROCEDURE — 87077 CULTURE AEROBIC IDENTIFY: CPT

## 2023-10-23 PROCEDURE — 87070 CULTURE OTHR SPECIMN AEROBIC: CPT

## 2023-10-23 PROCEDURE — 85610 PROTHROMBIN TIME: CPT

## 2023-10-23 PROCEDURE — 85613 RUSSELL VIPER VENOM DILUTED: CPT

## 2023-10-23 PROCEDURE — 1200000000 HC SEMI PRIVATE

## 2023-10-23 RX ORDER — ACETAMINOPHEN 325 MG/1
650 TABLET ORAL EVERY 6 HOURS PRN
Status: DISCONTINUED | OUTPATIENT
Start: 2023-10-23 | End: 2023-11-04 | Stop reason: HOSPADM

## 2023-10-23 RX ORDER — SENNOSIDES A AND B 8.6 MG/1
1 TABLET, FILM COATED ORAL NIGHTLY PRN
Status: DISCONTINUED | OUTPATIENT
Start: 2023-10-23 | End: 2023-11-04 | Stop reason: HOSPADM

## 2023-10-23 RX ORDER — HYDROMORPHONE HYDROCHLORIDE 1 MG/ML
0.25 INJECTION, SOLUTION INTRAMUSCULAR; INTRAVENOUS; SUBCUTANEOUS
Status: ACTIVE | OUTPATIENT
Start: 2023-10-23 | End: 2023-10-25

## 2023-10-23 RX ORDER — BISACODYL 10 MG
10 SUPPOSITORY, RECTAL RECTAL DAILY PRN
Status: DISCONTINUED | OUTPATIENT
Start: 2023-10-23 | End: 2023-11-04 | Stop reason: HOSPADM

## 2023-10-23 RX ORDER — SODIUM CHLORIDE 0.9 % (FLUSH) 0.9 %
5-40 SYRINGE (ML) INJECTION EVERY 12 HOURS SCHEDULED
Status: DISCONTINUED | OUTPATIENT
Start: 2023-10-23 | End: 2023-11-04 | Stop reason: HOSPADM

## 2023-10-23 RX ORDER — OXYCODONE HYDROCHLORIDE 5 MG/1
10 TABLET ORAL EVERY 4 HOURS PRN
Status: DISCONTINUED | OUTPATIENT
Start: 2023-10-23 | End: 2023-10-23

## 2023-10-23 RX ORDER — AMLODIPINE BESYLATE 5 MG/1
5 TABLET ORAL DAILY
Status: DISCONTINUED | OUTPATIENT
Start: 2023-10-24 | End: 2023-10-23

## 2023-10-23 RX ORDER — ASPIRIN 81 MG/1
81 TABLET, CHEWABLE ORAL DAILY
Status: DISCONTINUED | OUTPATIENT
Start: 2023-10-23 | End: 2023-10-25

## 2023-10-23 RX ORDER — LIDOCAINE HYDROCHLORIDE 10 MG/ML
5 INJECTION, SOLUTION EPIDURAL; INFILTRATION; INTRACAUDAL; PERINEURAL ONCE
Status: COMPLETED | OUTPATIENT
Start: 2023-10-23 | End: 2023-10-23

## 2023-10-23 RX ORDER — ATORVASTATIN CALCIUM 80 MG/1
80 TABLET, FILM COATED ORAL NIGHTLY
Status: DISCONTINUED | OUTPATIENT
Start: 2023-10-23 | End: 2023-11-04 | Stop reason: HOSPADM

## 2023-10-23 RX ORDER — SENNA AND DOCUSATE SODIUM 50; 8.6 MG/1; MG/1
2 TABLET, FILM COATED ORAL 2 TIMES DAILY
Status: DISCONTINUED | OUTPATIENT
Start: 2023-10-23 | End: 2023-10-23

## 2023-10-23 RX ORDER — OXYCODONE HYDROCHLORIDE 5 MG/1
10 TABLET ORAL EVERY 4 HOURS PRN
Status: DISCONTINUED | OUTPATIENT
Start: 2023-10-23 | End: 2023-10-25 | Stop reason: SDUPTHER

## 2023-10-23 RX ORDER — OXYCODONE HYDROCHLORIDE 5 MG/1
5 TABLET ORAL EVERY 4 HOURS PRN
Status: DISCONTINUED | OUTPATIENT
Start: 2023-10-23 | End: 2023-10-25 | Stop reason: SDUPTHER

## 2023-10-23 RX ORDER — AMLODIPINE BESYLATE 5 MG/1
5 TABLET ORAL DAILY
Status: DISCONTINUED | OUTPATIENT
Start: 2023-10-24 | End: 2023-11-04 | Stop reason: HOSPADM

## 2023-10-23 RX ORDER — POLYETHYLENE GLYCOL 3350 17 G/17G
17 POWDER, FOR SOLUTION ORAL DAILY PRN
Status: DISCONTINUED | OUTPATIENT
Start: 2023-10-23 | End: 2023-10-23

## 2023-10-23 RX ORDER — POLYETHYLENE GLYCOL 3350 17 G/17G
17 POWDER, FOR SOLUTION ORAL DAILY
Status: DISCONTINUED | OUTPATIENT
Start: 2023-10-23 | End: 2023-10-26

## 2023-10-23 RX ORDER — SODIUM CHLORIDE 0.9 % (FLUSH) 0.9 %
5-40 SYRINGE (ML) INJECTION PRN
Status: DISCONTINUED | OUTPATIENT
Start: 2023-10-23 | End: 2023-11-04 | Stop reason: HOSPADM

## 2023-10-23 RX ORDER — HYDROMORPHONE HYDROCHLORIDE 1 MG/ML
0.5 INJECTION, SOLUTION INTRAMUSCULAR; INTRAVENOUS; SUBCUTANEOUS
Status: ACTIVE | OUTPATIENT
Start: 2023-10-23 | End: 2023-10-25

## 2023-10-23 RX ORDER — OXYCODONE HYDROCHLORIDE 5 MG/1
5 TABLET ORAL EVERY 4 HOURS PRN
Status: DISCONTINUED | OUTPATIENT
Start: 2023-10-23 | End: 2023-10-23

## 2023-10-23 RX ORDER — SENNA AND DOCUSATE SODIUM 50; 8.6 MG/1; MG/1
2 TABLET, FILM COATED ORAL 2 TIMES DAILY
Status: DISCONTINUED | OUTPATIENT
Start: 2023-10-23 | End: 2023-11-04 | Stop reason: HOSPADM

## 2023-10-23 RX ORDER — SODIUM CHLORIDE 9 MG/ML
25 INJECTION, SOLUTION INTRAVENOUS PRN
Status: DISCONTINUED | OUTPATIENT
Start: 2023-10-23 | End: 2023-11-04 | Stop reason: HOSPADM

## 2023-10-23 RX ADMIN — SODIUM CHLORIDE, PRESERVATIVE FREE 10 ML: 5 INJECTION INTRAVENOUS at 20:35

## 2023-10-23 RX ADMIN — POLYETHYLENE GLYCOL 3350 17 G: 17 POWDER, FOR SOLUTION ORAL at 14:48

## 2023-10-23 RX ADMIN — SODIUM CHLORIDE, PRESERVATIVE FREE 10 ML: 5 INJECTION INTRAVENOUS at 08:22

## 2023-10-23 RX ADMIN — STANDARDIZED SENNA CONCENTRATE AND DOCUSATE SODIUM 2 TABLET: 8.6; 5 TABLET ORAL at 20:33

## 2023-10-23 RX ADMIN — ATORVASTATIN CALCIUM 80 MG: 80 TABLET, FILM COATED ORAL at 20:33

## 2023-10-23 RX ADMIN — METOPROLOL TARTRATE 50 MG: 50 TABLET, FILM COATED ORAL at 08:12

## 2023-10-23 RX ADMIN — SODIUM CHLORIDE, PRESERVATIVE FREE 10 ML: 5 INJECTION INTRAVENOUS at 20:48

## 2023-10-23 RX ADMIN — SODIUM CHLORIDE, PRESERVATIVE FREE 20 MG: 5 INJECTION INTRAVENOUS at 20:34

## 2023-10-23 RX ADMIN — LIDOCAINE HYDROCHLORIDE ANHYDROUS 5 ML: 10 INJECTION, SOLUTION INFILTRATION at 15:32

## 2023-10-23 RX ADMIN — CHLORHEXIDINE GLUCONATE 15 ML: 1.2 RINSE ORAL at 20:34

## 2023-10-23 RX ADMIN — CHLORHEXIDINE GLUCONATE 15 ML: 1.2 RINSE ORAL at 08:22

## 2023-10-23 RX ADMIN — PROPOFOL 25 MCG/KG/MIN: 10 INJECTION, EMULSION INTRAVENOUS at 06:35

## 2023-10-23 RX ADMIN — PROPOFOL 25 MCG/KG/MIN: 10 INJECTION, EMULSION INTRAVENOUS at 02:05

## 2023-10-23 RX ADMIN — SODIUM CHLORIDE, PRESERVATIVE FREE 10 ML: 5 INJECTION INTRAVENOUS at 09:12

## 2023-10-23 RX ADMIN — ASPIRIN 81 MG: 81 TABLET, CHEWABLE ORAL at 17:29

## 2023-10-23 RX ADMIN — SODIUM CHLORIDE, PRESERVATIVE FREE 20 MG: 5 INJECTION INTRAVENOUS at 08:12

## 2023-10-23 RX ADMIN — Medication 25 MCG/HR: at 06:50

## 2023-10-23 ASSESSMENT — PULMONARY FUNCTION TESTS
PIF_VALUE: 13
PIF_VALUE: 13
PIF_VALUE: 12
PIF_VALUE: 13
PIF_VALUE: 11

## 2023-10-23 NOTE — NURSE NAVIGATOR
Patient's chart reviewed for Stroke Core Measures and additional needs:    [x]   VTE prophylaxis - SCDs on    [x]   Antithrombotic (if applicable) - Plan to start ASA today, awaiting orders    [x]   Swallow screen prior to PO intake - VENT. Consult SLP when appropriate    [x]   Lipids / A1C ordered or resulted   [x]   Therapy ordered - Will discuss with [facility] team and order when appropriate    [x]   Care plan and Education template - Added     Verified educational Stroke booklet in room for patient and/or family to review. Patients personal risk factors specific to stroke/TIA include: Overweight, Possible underlying hypercoagulable disorder (?). Risk factors discussed with family at bedside; stroke w/u in the young in progress - family agreeable, reports no additional needs or questions at this time. Navigator to continue to follow patient while admitted, to assist with follow up and discharge planning as needed.      Nurse eSignature: Electronically signed by Rikki Taylor RN on 10/23/23 at 4:44 PM EDT - Neuroscience Navigator

## 2023-10-23 NOTE — PROGRESS NOTES
Comprehensive Nutrition Assessment    RECOMMENDATIONS:  Initiate Pivot (high protein/metabolic stress formula) at 10 mL/hr and as tolerated, increase by 10 mL/hr q 4 hours until goal of 45 mL/hr is met via OG   +2 Prosource daily  Flushes: 150 mL q4h  Modify bowel regimen from PRN to scheduled while pt is on fentanyl  Check TG while on diprivan infusion  Monitor nutrition adequacy, pertinent labs, bowel habits, wt changes, and clinical progress    Nutrition Education: Education not appropriate     NUTRITION ASSESSMENT:   Nutritional summary & status: Pt remains intubated, no pressor requirements. Sedated on fentanyl and propofol. Propofol providing 473 kcal/day. Plan to start TF today. Hypernatremic @ 152, per NSGY, ok to bring Na down slowly. Spoke w/ family, no recent wt loss and pt was eating well prior to admission. No immediate concern for refeeding syndrome. Plan to modify bowel regimen from PRN to scheduled d/t fentanyl use. Admission // PMH: CVA//stroke, polysubstance abuse, acute respiratory failure    MALNUTRITION ASSESSMENT  Context of Malnutrition: Acute Illness   Malnutrition Status:  At risk for malnutrition (Comment)  Findings of the 6 clinical characteristics of malnutrition (Minimum of 2 out of 6 clinical characteristics is required to make the diagnosis of moderate or severe Protein Calorie Malnutrition based on AND/ASPEN Guidelines):  Energy Intake:  Mild decrease in energy intake (Comment)  Weight Loss:  No significant weight loss     Body Fat Loss:  No significant body fat loss     Muscle Mass Loss:  No significant muscle mass loss    Fluid Accumulation:  No significant fluid accumulation      NUTRITION DIAGNOSIS   Inadequate oral intake related to impaired respiratory function as evidenced by intubation    Nutrition Monitoring and Evaluation:   Food/Nutrient Intake Outcomes:  Enteral Nutrition Intake/Tolerance  Physical Signs/Symptoms Outcomes:  Biochemical Data, Nutrition Focused Physical DISPLAY PLAN FREE TEXT

## 2023-10-23 NOTE — PROGRESS NOTES
NEUROLOGY / NEUROCRITICAL CARE PROGRESS NOTE       Patient Name: Antwon Dickey YOB: 1980   Sex: Female Age: 41 yrs     CC / Reason for Consult: Left MCA stroke    Subjective / ROS / Updates over last 24 hours:  Patient is now status post left hemicraniectomy  MRI of brain completed and showed full left MCA stroke  On vent wean this morning, sleepy but following commands      HISTORY     Allergies No Known Allergies   Diet Diet NPO   Isolation No active isolations     LABS   Metabolic Panel Recent Labs     10/20/23  2325 10/21/23  1122 10/21/23  1532 10/22/23  0615 10/22/23  0820 10/22/23  1030 10/23/23  0330 10/23/23  0723 10/23/23  0923    144   < > 151* 151*   < > 153* 154*  154* 152*   K 3.6 3.3*  --  3.8 3.6  --   --  3.6  --     109  --  117* 116*  --   --  121*  --    CO2 23 26  --  23 25  --   --  25  --    BUN 20 18  --  12 12  --   --  17  --    CREATININE 0.7 0.7  --  0.7 0.7  --   --  0.8  --    GLUCOSE 115* 115*  --  133* 140*  --   --  116*  --    CALCIUM 8.8 8.6  --  8.2* 8.1*  --   --  8.2*  --    LABALBU 4.3  --   --   --   --   --   --   --   --    MG 2.00 2.20  --   --   --   --   --   --   --    ALKPHOS 68  --   --   --   --   --   --   --   --    ALT 16  --   --   --   --   --   --   --   --    AST 22  --   --   --   --   --   --   --   --    AMMONIA 25  --   --   --   --   --   --   --   --     < > = values in this interval not displayed.         CBC / Coags Recent Labs     10/20/23  2325 10/22/23  0713 10/22/23  0820 10/23/23  0723   WBC 12.3*  --  15.4* 18.8*   RBC 4.75  --  3.95* 3.78*   HGB 9.7*  --  8.1* 7.7*   HCT 32.4*  --  26.8* 26.3*   *  --  417 364   INR 1.11 1.27*  --   --         Other Lab Results   Component Value Date/Time    TRIG 350 10/21/2023 06:30 AM    TRIG 350 10/21/2023 06:30 AM    LABSALI <0.3 10/20/2023 11:25 PM     Lab Results   Component Value Date/Time    LACTSEPSIS 1.4 10/20/2023 11:25 PM        CURRENT SCHEDULED TempSrc:       SpO2: 100% 100% 100%    Height:    1.778 m (5' 10\")               NIHSS Stroke Scale Assessed: Yes (10/23/23 0800)   NIH Stroke Scale  Interval: Hand-off/Transfer  Level of Consciousness (1a): Not alert, requires repeated stimulation to attend  LOC Questions (1b): Answers one correctly  LOC Commands (1c): Performs neither task correctly  Best Gaze (2): Normal  Visual (3): No visual loss  Facial Palsy (4): Normal symmetrical movement  Motor Arm, Left (5a): No drift  Motor Arm, Right (5b): No movement  Motor Leg, Left (6a): No drift  Motor Leg, Right (6b): No movement  Limb Ataxia (7): Absent  Sensory (8): Normal  Best Language (9): No aphasia  Dysarthria (10): Intubated or other physical barrier (comment)  Extinction and Inattention (11): No abnormality  Total: 13        Patient intubated, sedation off around 1000 this AM. Eyes open to voice. Will follow a few commands on L side. CN:  Pupils equal and reactive   Blinks to threat on L, not R  Corneal reflex intact  Bedside RN reports good cough with suctioning  MEREDITH facial symmetry due to ETT dunham    Motor:  R hemiplegia    and gives thumbs up on L side, wiggles L foot and moves L side spontaneously  ASSESSMENT & RECOMMENDATIONS   Patient is a 40 y/o F w/ large L MCA stroke - presented outside window for TNK / EVT  S/p L Hemicraniectomy on 10/21/23  MRI brain shows large L MCA infarct   Patient has personal hx of 9 miscarriages, raising concern for hypercoaguable state.     Plan:   -Will need stroke in the young w/u - hypercoag panel sent (apparently under a different medical record number) will need to confirm with lab today and reorder if not sent.   -Vessel imaging: completed  -Echocardiogram with bubble - if not previously completed (ordered)  -Nursing bedside swallow prior to any PO intake   -ASA 81mg PO daily - ok per Neurosurgery to start ASA today  -High-intensity statin   - 3% weaning off today  -okay for DVT chemoprophylaxis on

## 2023-10-23 NOTE — PROGRESS NOTES
Current NIHSS 12    Nursing Core Measures for Stroke:   [x]   Education template documentation (STROKE/TIA). Please select only risk factors that are applicable to patient when selecting risk factors. [x]   Care Plan template documentation (Physiologic Instability - Neurosensory). Selecting this will add care plan rows to the flowsheet under the Neuro section of Head to Toe. [x]   Verified Swallow Screen completed prior to PO intake of food, drink, medications  [x]   VTE Prophylaxis: SCDs ordered/addressed; SCDs: On           (As a reminder, ASA, Plavix, and TPA/TNK are not VTE prophylaxis.)    Reviewed the Following Education with Patient and/or Family:   - Personalized risk factors for patient, along with changes, modifications that will help prevent stroke. - Signs and Symptoms of Stroke: (Facial droop, weakness/numbness especially on one side, speech difficulty, sudden confusion, sudden loss of vision, sudden severe headache, sudden loss of balance or having difficulty walking, syncope, or seizure)  - How to activate EMS (911)   - Importance of Follow Up Appointments at Discharge   - Importance of Compliance with Medications Prescribed at Discharge  - Available community resources and stroke advocacy groups if needed    Patient and/or family member: not available / not appropriate for education at this time. Stroke Education booklet given to patient/family (or verified, if given already), which reviews above information.  yes         Electronically signed by Julien Schaefer RN on 10/23/2023 at 5:07 PM

## 2023-10-23 NOTE — DISCHARGE INSTRUCTIONS
[facility] Stroke Program Survey  The [facility] values your feedback related to your recent hospital visit and admission. We strive to improve our Neuroscience program to promote better outcomes and recoveries for all our patients. The anonymous survey below consists of a few questions that are related to your stay and around your Stroke diagnosis, treatment, and recovery. It is anonymous and has only a few questions. The estimated length of time needed to complete this survey is 3 minutes or less. Thank you for completing this survey! CAM MONITOR INSTRUCTIONS    Patient instructions for CAM monitor: You will need to wear monitor for 2 weeks. Mail monitor back or return to office on following date. Remove date:      [facility]  [address]  [address], [address] [place] [address],[address]  [phone]         Make sure to save box as you will place monitor back in postage paid box to return to company. After removing monitor stick it to template provided, place both your log and monitor in box and place in mailbox. If monitor comes off but has been in place at least 7 days place in box and mail back. If it comes off sooner than 7 days you will have to call office and return to have it replaced. Avoid excess sweating to maximize wear time. You are able to shower after 24 hours, however have majority of water hitting back and not directly on monitor. Do not submerge in bath. If you experience any symptoms while wearing monitor push button and record in booklet.       For questions about monitor call: Customer Service [phone]

## 2023-10-23 NOTE — PROGRESS NOTES
ICU Progress Note    Admit Date: 10/21/2023  Day: 3  Vent Day: 3  IV Access:Peripheral  IV Fluids:None  Vasopressors:None                Antibiotics:    Diet: Diet NPO    CC: AMS    Interval history: Pt seen and examined at bedside this AM. POD 2. Hypertonic saline dc'd overnight. She was on 25 propofol, 25 fentanyl, OG tube and EVD in place. HPI:  Per 10/21 note  \"HPI: Milena Alamo is a 40 yo female with no available documented medical history who presented from home to [facility] ED after family found her unconscious. Per ED note, her family states they believe she took multiple illicit drugs as well as large amounts of alcohol and had been sleeping for over 20 hours. EMS was called, gave Narcan with minimal response. On arrival to the ED she was nonverbal, was able to intermittently follow commands. GCS of 9 on arrival.      CT Head showed large left MCA infarct, 7 mm left-to-right midline shift  CTA Head Neck showed left cervical ICA, left intracranial ICA and left M1 MCA occlusions. ED paged the stroke and neurosurgery teams, who decided on immediate transfer to [facility] for further neurocritical care. Prior to transport the patient was intubated using etomidate and rocuronium for airway protection 2/2 GCS decline with decorticate posturing. Patient underwent hemicraniectomy on 10/22 after declining neuro exam and repeat head CT showing worsening edema, midline shift, and possible uncal herniation. \"    Medications:     Scheduled Meds:   metoprolol tartrate  50 mg Oral BID    chlorhexidine  15 mL Mouth/Throat BID    famotidine (PEPCID) injection  20 mg IntraVENous BID    sodium chloride flush  5-40 mL IntraVENous 2 times per day    atorvastatin  80 mg Oral Nightly     Continuous Infusions:   3% sodium chloride 25 mL/hr (10/22/23 1246)    propofol 25 mcg/kg/min (10/23/23 3195)    fentaNYL 25 mcg/hr (10/22/23 8004)    sodium chloride      niCARdipine 7.5 mg/hr (10/22/23 0032)     PRN with Dr. Caren Galloway. I agree with the interval history. My physical exam confirms the findings listed below  Chart was reviewed including labs, CT and medical records confirm the findings noted    CVC Triple Lumen 10/21/23 Right Femoral (Active)   Number of days: 2       Peripheral IV 10/20/23 Right Antecubital (Active)   Number of days: 2       Peripheral IV 10/21/23 Right; Anterior Forearm (Active)   Number of days: 2     Acute respiratory  failure on mechanical vent support  , RR 16, FiO2 30, PEEP 5  ABG after breathing trial 7.42/38/82  Acute ischemic stroke. Large, involving Lt MCA distribution. Remain on hypertonic saline    Hypernatremia - now off hypertonic saline   Leukocytosis - no clear focus of infection. Continue vent support for airway protection as she is still lethargic.    3% saline is off. Start TF  Switch metoprolol to amlodipine     Pt has a high probability of imminent or life-threatening deterioration requiring close monitoring, and highly complex decision-making and/or interventions of high intensity to assess, manipulate, and support his critical organ systems to prevent a likely inevitable decline which could occur if left untreated. A total critical care time 35 minutes was used. This includes but not limited to examining patient, collaborating with other physicians, monitoring vital signs, telemetry, continuous pulse oximetry, and clinical response to IV medications, documentation time, review and interpretation of laboratory and radiological data, review of nursing notes and old record review. This time excludes any time that may have been spent performing procedures for life threatening organ failure.

## 2023-10-23 NOTE — PROGRESS NOTES
Subgaleal drain removed with sterile technique. 2 staples placed for closure. Patient tolerated well. No complications.     Electronically signed by: ZACARIAS Abbott CNP, APRN-CNP, 10/23/2023 1:32 PM  [phone]

## 2023-10-23 NOTE — PLAN OF CARE
Problem: Discharge Planning  Goal: Discharge to home or other facility with appropriate resources  10/23/2023 1515 by Adin Navarro RN  Outcome: Progressing  10/23/2023 0522 by Lizzette Beard RN  Outcome: Progressing     Problem: Pain  Goal: Verbalizes/displays adequate comfort level or baseline comfort level  10/23/2023 1515 by Adin Navarro RN  Outcome: Progressing  10/23/2023 0522 by Lizzette Beard RN  Outcome: Progressing     Problem: Skin/Tissue Integrity  Goal: Absence of new skin breakdown  Description: 1. Monitor for areas of redness and/or skin breakdown  2. Assess vascular access sites hourly  3. Every 4-6 hours minimum:  Change oxygen saturation probe site  4. Every 4-6 hours:  If on nasal continuous positive airway pressure, respiratory therapy assess nares and determine need for appliance change or resting period.   10/23/2023 1515 by Adin Navarro RN  Outcome: Progressing  10/23/2023 0522 by Lizzette Beard RN  Outcome: Progressing     Problem: Safety - Adult  Goal: Free from fall injury  10/23/2023 1515 by Adin Navarro RN  Outcome: Progressing  Flowsheets (Taken 10/23/2023 1512)  Free From Fall Injury: Instruct family/caregiver on patient safety  10/23/2023 0522 by Lizzette Beard RN  Outcome: Progressing     Problem: Nutrition Deficit:  Goal: Optimize nutritional status  10/23/2023 1515 by Adin Navarro RN  Outcome: Progressing  10/23/2023 0522 by Lizzette Beard RN  Outcome: Progressing     Problem: Neurosensory - Adult  Goal: Achieves stable or improved neurological status  10/23/2023 1515 by Adin Navarro RN  Outcome: Progressing  10/23/2023 0522 by Lizzette Beard RN  Outcome: Progressing  Goal: Achieves maximal functionality and self care  10/23/2023 1515 by Adin Navarro RN  Outcome: Progressing  10/23/2023 0522 by Lizzette Beard RN  Outcome: Progressing     Problem: Chronic Conditions and Co-morbidities  Goal: Patient's chronic conditions and

## 2023-10-23 NOTE — PLAN OF CARE
Problem: Discharge Planning  Goal: Discharge to home or other facility with appropriate resources  Outcome: Progressing     Problem: Pain  Goal: Verbalizes/displays adequate comfort level or baseline comfort level  Outcome: Progressing     Problem: Skin/Tissue Integrity  Goal: Absence of new skin breakdown  Description: 1. Monitor for areas of redness and/or skin breakdown  2. Assess vascular access sites hourly  3. Every 4-6 hours minimum:  Change oxygen saturation probe site  4. Every 4-6 hours:  If on nasal continuous positive airway pressure, respiratory therapy assess nares and determine need for appliance change or resting period. Outcome: Progressing     Problem: Safety - Adult  Goal: Free from fall injury  Outcome: Progressing     Problem: Neurosensory - Adult  Goal: Achieves stable or improved neurological status  Outcome: Progressing     Problem: Safety - Medical Restraint  Goal: Remains free of injury from restraints (Restraint for Interference with Medical Device)  Description: INTERVENTIONS:  1. Determine that other, less restrictive measures have been tried or would not be effective before applying the restraint  2. Evaluate the patient's condition at the time of restraint application  3. Inform patient/family regarding the reason for restraint  4.  Q2H: Monitor safety, psychosocial status, comfort, nutrition and hydration  Outcome: Progressing  Flowsheets  Taken 10/23/2023 0400 by Johana Barnett RN  Remains free of injury from restraints (restraint for interference with medical device): Determine that other, less restrictive measures have been tried or would not be effective before applying the restraint  Taken 10/23/2023 0200 by Johana Barnett RN  Remains free of injury from restraints (restraint for interference with medical device): Determine that other, less restrictive measures have been tried or would not be effective before applying the restraint  Taken 10/23/2023 0000 by Mally

## 2023-10-23 NOTE — PROGRESS NOTES
Orellana catheter removed intact at this time per nurse driven protocol. Pure-wick external catheter placed.

## 2023-10-23 NOTE — PROCEDURES
TRIMMABLE POWER MIDLINE PROCEDURE NOTE  Chart reviewed for allergies, diagnosis, labs, known contraindications, reason for line placement and planned length of treatment. Insertion procedure discussed with patient/family member. Informed consent not required for Midline placement. Three patient identifiers - Patient name,   and MRN -  completed &  confirmed verbally. Hat, mask and eye shield donned. Midline site scrubbed with Chloraprep  One-Step applicator  for 30 seconds x 1. Hand Hygiene  performed with 3% Chlorhexidine surgical scrub x1 min prior to  Sterile gloves, sterile gown being donned. Patient draped using maximal sterile barrier technique ( head to toe ). Midline site scrubbed a 2nd time with Chloraprep One-Step applicator x 30 sec. Vein located by Sirigen Sound and site marked with sterile pen. 1% Lidocaine 5 ml injected intradermal pre-insertion. Midline inserted. Positive brisk blood return obtained. Valve applied to lumen. Midline flushed with 10 mls  0.9% Sterile Sodium Chloride. Midline flushes easily with no resistance. Skin prep applied to site. Catheter secured with non-sutured locking device per hospital protocol. Bio-patch/CHG impregnated sterile tegaderm dressing applied. Alcohol Swab Caps applied to valve. Sterile field maintained during procedure. Positioning wire accounted for post procedure. Pt. Response to procedure, tolerated well. Appearance of site: Clean dry and intact without bleeding or edema. All edges of Tegaderm occlusive. Site marked with date and initials of RN placing line. Top 2 side rails in up position, call button in reach, RN notified of all of the above. A Trimmable Power Midline 13 CM placed in the  JOYCE brachial  vein. 0  cm showing from insertion site.

## 2023-10-23 NOTE — PROGRESS NOTES
V2.0    [facility] [place]      Name:  Palak Boateng /Age/Sex: 1980  (41 y.o. female)   MRN & CSN:  [id number] & [id number] Encounter Date/Time: 10/23/2023 7:01 AM EDT   Location:  [address] [place] [address] PCP: No primary care provider on file. 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 with midline shift:  Followed by neurosurgery and critical care team  Reviewed their note     Acute respiratory failure:  Her mental status decline and need to protect her airway  Early on FiO2 30% managed by critical care team  Remains on vent     Microcytic anemia:  Trend hemoglobin 7.7     Polysubstance abuse disorder:  Positive for THC amphetamines  Noted in the past she has used ecstasy opioids and alcohol      Diet Diet NPO   DVT Prophylaxis [] Lovenox, []  Heparin, [x] SCDs, [] Ambulation,  [] Eliquis, [] Xarelto  [] Coumadin   Code Status Full Code       Surrogate Decision Maker/ POA       Personally reviewed Lab Studies and Imaging     Reviewed progress note from critical care team              Subjective:     Chief Complaint: Jeanette Bella is a 41 y.o. female who presented from home to [facility] ED after family found her unconscious. Per ED note, her family states they believe she took multiple illicit drugs as well as large amounts of alcohol and had been sleeping for over 20 hours. EMS was called, gave Narcan with minimal response. On arrival to the ED she was nonverbal, was able to intermittently follow commands. GCS of 9 on arrival. At that time CT Head showed large left MCA infarct, 7 mm left-to-right midline shift CTA Head Neck showed left cervical ICA, left intracranial ICA and left M1 MCA occlusions. Prior to transport the patient was intubated.   After arriving here patient underwent hemicraniectomy on 10/22 after declining neuro exam and repeat head CT are provided for review. Stenosis of the internal carotid arteries measured using NASCET criteria. Automated exposure control, iterative reconstruction, and/or weight based adjustment of the mA/kV was utilized to reduce the radiation dose to as low as reasonably achievable. Noncontrast CT of the head with reconstructed 2-D images are also provided for review. COMPARISON: None. HISTORY: ORDERING SYSTEM PROVIDED HISTORY: ams TECHNOLOGIST PROVIDED HISTORY: Reason for exam:->ams Has a \"code stroke\" or \"stroke alert\" been called? ->No Decision Support Exception - unselect if not a suspected or confirmed emergency medical condition->Emergency Medical Condition (MA) Is the patient pregnant?->No Reason for Exam: ams; ORDERING SYSTEM PROVIDED HISTORY: ams TECHNOLOGIST PROVIDED HISTORY: Reason for exam:->ams Has a \"code stroke\" or \"stroke alert\" been called? ->No Decision Support Exception - unselect if not a suspected or confirmed emergency medical condition->Emergency Medical Condition (MA) Reason for Exam: ams FINDINGS: CT HEAD: BRAIN/VENTRICLES: Large acute left MCA infarct, loss of gray-white differentiation in the left frontal, left parietal, left temporal lobes, with sulcal effacement. Midline shift of approximately 7 mm left to right. Hyperattenuating left MCA, consistent with thrombus. No acute intracranial hemorrhage or extraaxial fluid collection. No evidence of mass, mass effect or midline shift. No evidence of hydrocephalus. ORBITS: The visualized portion of the orbits demonstrate no acute abnormality. SINUSES:  The visualized paranasal sinuses and mastoid air cells demonstrate no acute abnormality. SOFT TISSUES/SKULL: No acute abnormality of the visualized skull or soft tissues. CTA NECK: AORTIC ARCH/ARCH VESSELS: No dissection or arterial injury. No significant stenosis of the brachiocephalic or subclavian arteries. CAROTID ARTERIES: Occluded left cervical ICA (series 2, image 140).   No dissection, arterial

## 2023-10-24 LAB
ANION GAP SERPL CALCULATED.3IONS-SCNC: 9 MMOL/L (ref 3–16)
BUN SERPL-MCNC: 25 MG/DL (ref 7–20)
CALCIUM SERPL-MCNC: 9.1 MG/DL (ref 8.3–10.6)
CHLORIDE SERPL-SCNC: 124 MMOL/L (ref 99–110)
CHOLEST SERPL-MCNC: 126 MG/DL (ref 0–199)
CO2 SERPL-SCNC: 25 MMOL/L (ref 21–32)
CREAT SERPL-MCNC: 0.8 MG/DL (ref 0.6–1.1)
DEPRECATED RDW RBC AUTO: 18.3 % (ref 12.4–15.4)
EST. AVERAGE GLUCOSE BLD GHB EST-MCNC: 114 MG/DL
FACT VIII ACT/NOR PPP: 369 % (ref 50–150)
GFR SERPLBLD CREATININE-BSD FMLA CKD-EPI: >60 ML/MIN/{1.73_M2}
GLUCOSE SERPL-MCNC: 152 MG/DL (ref 70–99)
HBA1C MFR BLD: 5.6 %
HCT VFR BLD AUTO: 26.4 % (ref 36–48)
HCYS SERPL-SCNC: 14 UMOL/L (ref 0–10)
HDLC SERPL-MCNC: 60 MG/DL (ref 40–60)
HGB BLD-MCNC: 7.7 G/DL (ref 12–16)
LDLC SERPL CALC-MCNC: 49 MG/DL
MAGNESIUM SERPL-MCNC: 2.6 MG/DL (ref 1.8–2.4)
MCH RBC QN AUTO: 20.2 PG (ref 26–34)
MCHC RBC AUTO-ENTMCNC: 29.2 G/DL (ref 31–36)
MCV RBC AUTO: 69 FL (ref 80–100)
PHOSPHATE SERPL-MCNC: 1.8 MG/DL (ref 2.5–4.9)
PLATELET # BLD AUTO: 386 K/UL (ref 135–450)
PMV BLD AUTO: 7.7 FL (ref 5–10.5)
POTASSIUM SERPL-SCNC: 4.2 MMOL/L (ref 3.5–5.1)
RBC # BLD AUTO: 3.82 M/UL (ref 4–5.2)
SODIUM SERPL-SCNC: 157 MMOL/L (ref 136–145)
SODIUM SERPL-SCNC: 158 MMOL/L (ref 136–145)
SODIUM SERPL-SCNC: 159 MMOL/L (ref 136–145)
TRIGL SERPL-MCNC: 87 MG/DL (ref 0–150)
VLDLC SERPL CALC-MCNC: 17 MG/DL
WBC # BLD AUTO: 17.4 K/UL (ref 4–11)

## 2023-10-24 PROCEDURE — 6370000000 HC RX 637 (ALT 250 FOR IP)

## 2023-10-24 PROCEDURE — 80048 BASIC METABOLIC PNL TOTAL CA: CPT

## 2023-10-24 PROCEDURE — 84100 ASSAY OF PHOSPHORUS: CPT

## 2023-10-24 PROCEDURE — 6360000002 HC RX W HCPCS

## 2023-10-24 PROCEDURE — 83735 ASSAY OF MAGNESIUM: CPT

## 2023-10-24 PROCEDURE — 85027 COMPLETE CBC AUTOMATED: CPT

## 2023-10-24 PROCEDURE — 6370000000 HC RX 637 (ALT 250 FOR IP): Performed by: INTERNAL MEDICINE

## 2023-10-24 PROCEDURE — 2580000003 HC RX 258

## 2023-10-24 PROCEDURE — 84295 ASSAY OF SERUM SODIUM: CPT

## 2023-10-24 PROCEDURE — 2500000003 HC RX 250 WO HCPCS

## 2023-10-24 PROCEDURE — 36415 COLL VENOUS BLD VENIPUNCTURE: CPT

## 2023-10-24 PROCEDURE — C8923 2D TTE W OR W/O FOL W/CON,CO: HCPCS

## 2023-10-24 PROCEDURE — A4216 STERILE WATER/SALINE, 10 ML: HCPCS

## 2023-10-24 PROCEDURE — 99291 CRITICAL CARE FIRST HOUR: CPT | Performed by: NURSE PRACTITIONER

## 2023-10-24 PROCEDURE — 99291 CRITICAL CARE FIRST HOUR: CPT | Performed by: INTERNAL MEDICINE

## 2023-10-24 PROCEDURE — 94761 N-INVAS EAR/PLS OXIMETRY MLT: CPT

## 2023-10-24 PROCEDURE — 2700000000 HC OXYGEN THERAPY PER DAY

## 2023-10-24 PROCEDURE — 94003 VENT MGMT INPAT SUBQ DAY: CPT

## 2023-10-24 PROCEDURE — 2000000000 HC ICU R&B

## 2023-10-24 PROCEDURE — 6360000004 HC RX CONTRAST MEDICATION

## 2023-10-24 PROCEDURE — 1200000000 HC SEMI PRIVATE

## 2023-10-24 RX ORDER — ENOXAPARIN SODIUM 100 MG/ML
40 INJECTION SUBCUTANEOUS DAILY
Status: DISCONTINUED | OUTPATIENT
Start: 2023-10-24 | End: 2023-10-26

## 2023-10-24 RX ORDER — MAGNESIUM SULFATE IN WATER 40 MG/ML
4000 INJECTION, SOLUTION INTRAVENOUS ONCE
Status: DISCONTINUED | OUTPATIENT
Start: 2023-10-24 | End: 2023-10-25 | Stop reason: SDUPTHER

## 2023-10-24 RX ADMIN — CHLORHEXIDINE GLUCONATE 15 ML: 1.2 RINSE ORAL at 07:51

## 2023-10-24 RX ADMIN — DIBASIC SODIUM PHOSPHATE, MONOBASIC POTASSIUM PHOSPHATE AND MONOBASIC SODIUM PHOSPHATE 2 TABLET: 852; 155; 130 TABLET ORAL at 16:46

## 2023-10-24 RX ADMIN — SODIUM CHLORIDE, PRESERVATIVE FREE 10 ML: 5 INJECTION INTRAVENOUS at 21:15

## 2023-10-24 RX ADMIN — SODIUM CHLORIDE, PRESERVATIVE FREE 20 MG: 5 INJECTION INTRAVENOUS at 21:15

## 2023-10-24 RX ADMIN — PERFLUTREN 1.5 ML: 6.52 INJECTION, SUSPENSION INTRAVENOUS at 15:30

## 2023-10-24 RX ADMIN — STANDARDIZED SENNA CONCENTRATE AND DOCUSATE SODIUM 2 TABLET: 8.6; 5 TABLET ORAL at 07:52

## 2023-10-24 RX ADMIN — SODIUM CHLORIDE, PRESERVATIVE FREE 10 ML: 5 INJECTION INTRAVENOUS at 07:52

## 2023-10-24 RX ADMIN — ENOXAPARIN SODIUM 40 MG: 100 INJECTION SUBCUTANEOUS at 12:13

## 2023-10-24 RX ADMIN — LABETALOL HYDROCHLORIDE 10 MG: 5 INJECTION, SOLUTION INTRAVENOUS at 12:13

## 2023-10-24 RX ADMIN — AMLODIPINE BESYLATE 5 MG: 5 TABLET ORAL at 07:51

## 2023-10-24 RX ADMIN — CHLORHEXIDINE GLUCONATE 15 ML: 1.2 RINSE ORAL at 21:15

## 2023-10-24 RX ADMIN — ATORVASTATIN CALCIUM 80 MG: 80 TABLET, FILM COATED ORAL at 21:15

## 2023-10-24 RX ADMIN — SODIUM CHLORIDE, PRESERVATIVE FREE 20 MG: 5 INJECTION INTRAVENOUS at 07:51

## 2023-10-24 RX ADMIN — ASPIRIN 81 MG: 81 TABLET, CHEWABLE ORAL at 07:51

## 2023-10-24 RX ADMIN — STANDARDIZED SENNA CONCENTRATE AND DOCUSATE SODIUM 2 TABLET: 8.6; 5 TABLET ORAL at 21:15

## 2023-10-24 RX ADMIN — POLYETHYLENE GLYCOL 3350 17 G: 17 POWDER, FOR SOLUTION ORAL at 07:51

## 2023-10-24 RX ADMIN — DIBASIC SODIUM PHOSPHATE, MONOBASIC POTASSIUM PHOSPHATE AND MONOBASIC SODIUM PHOSPHATE 2 TABLET: 852; 155; 130 TABLET ORAL at 21:15

## 2023-10-24 ASSESSMENT — PULMONARY FUNCTION TESTS
PIF_VALUE: 13
PIF_VALUE: 15
PIF_VALUE: 13

## 2023-10-24 NOTE — PLAN OF CARE
Problem: Pain  Goal: Verbalizes/displays adequate comfort level or baseline comfort level  10/24/2023 1006 by Ben Ye RN  Outcome: Progressing  Note: Pt CPOT for pain scale. Pt does not appear to be in any pain this morning. Will monitor. Problem: Skin/Tissue Integrity  Goal: Absence of new skin breakdown  Description: 1. Monitor for areas of redness and/or skin breakdown  2. Assess vascular access sites hourly  3. Every 4-6 hours minimum:  Change oxygen saturation probe site  4. Every 4-6 hours:  If on nasal continuous positive airway pressure, respiratory therapy assess nares and determine need for appliance change or resting period. 10/24/2023 1006 by Ben Ye RN  Outcome: Progressing  Note: Pt at risk for skin breakdown. See Paras score. Pt remains on bedrest. Unable to reposition self in bed. Heels elevated off bed. Sacral heart mepilex intact to protect, site inspected and intact underneath. Will continue to turn and reposition patient every two hours and as needed. Will continue to keep patient clean and dry, applying skin care cream as needed. Pillows used for repositioning q2hs. Will continue to monitor and assess for skin breakdown. Problem: Safety - Adult  Goal: Free from fall injury  10/24/2023 1006 by Ben Ye RN  Outcome: Progressing  Note: Pt is a fall risk. Fall risk protocol in place. See Rodolfo Raymondles Fall Score. Pt bed is in low position, bed alarm is on, side rails up, fall risk bracelet applied. , non-skid footwear in use. Patient/family educated on fall risk protocol, instructed to call for assistance when needed and belongings are in reach. assistance. Will continue with hourly rounds for po intake, pain needs, toileting and repositioning as needed. Will continue to monitor for needs. Problem: Nutrition Deficit:  Goal: Optimize nutritional status  10/24/2023 1006 by Ben Ye RN  Outcome: Progressing  Note: Pt tolerating TF thus far.  Will

## 2023-10-24 NOTE — PROGRESS NOTES
NEUROSURGERY PROGRESS NOTE    10/24/2023 9:19 AM                               Melodysanju Carroll                      LOS: 3 days   POD# 3 s/p Procedure(s) (LRB):  LEFT DECOMPRESSIVE FLORA-CRANIECTOMY (Left)    Subjective: Patient laying in bed upon entering the room. No acute events overnight. Patient remains on ventilator (CPAP mode). Physical Exam:  Patient seen and examined    Vitals:    10/24/23 0729   BP:    Pulse: 79   Resp: 17   Temp:    SpO2: 100%     GCS:  3 - Opens eyes to loud noise or command  1 - Makes no noise  6 - Follows simple motor commands  General: Well developed. Intubated and off sedation   HENT: ETT in place; Surgical incision to left side of scalp  Eyes: Optic discs: Not tested  Pulmonary: CPAP mode on ventilator  Cardiovascular:  Warm well perfused.  No peripheral edema  Gastrointestinal: abdomen soft, NT, ND    Neurological:  Mental Status: Lethargic, opens eyes to voice, ETT in place, moves some extremities to command  Attention: Follows some simple commands at times  Language: MEREDITH 2/2 ETT in place  Sensation: Intact to all extremities to light touch on left side of body and grimaces to noxious tactile stimuli on right side of body  Coordination: exam MEREDITH 2/2 lethargy    Cranial Nerves:  II: Visual acuity not tested, denies new visual changes / diplopia  III, IV, VI: PERRL, 3 mm bilaterally, EOM exam MEREDITH 2/2 lethargy  V: Facial sensation intact bilaterally to touch  VII: Face symmetric  VIII: Hearing intact bilaterally to spoken voice  IX: Palate movement exam MEREDITH 2/2 ETT in place  XI: Shoulder shrug exam MEREDITH 2/2 lethargy  XII: Tongue midline exam MEREDITH 2/2 ETT in place    Musculoskeletal:   Gait: Not tested   Assist devices: None   Tone: Normal  Motor strength: Exam limited 2/2 recent sedation and pain medication    LUE- Follows commands (Grasp hand & show thumb) with moderate strength, and does move independently against gravity  RUE- No movement to noxious tactile stimuli  LLE- a large completed left MCA ischemic infarct and left ICA and left MCA occlusion on CTA. Repeat imaging demonstrated evolving midline shift and brain edema, despite medical management including hypertonic therapy. After reviewing the treatment options, and after discussion with the critical care team, decompressive hemicraniectomy was recommended. Patient s/p LEFT DECOMPRESSIVE FLORA-CRANIECTOMY by Dr. Aury Douglas on 10/21/2023. Plan:  Neurologic exams frequency: Defer to Neurocritical Care  For change in exam MUST contact neurosurgery team along with critical care or primary team  Cerebrovascular accident (CVA) due to embolism of left middle cerebral artery (HCC)  - s/p LEFT DECOMPRESSIVE FLORA-CRANIECTOMY (Left)  - MRI Brain shows very large acute bland left MCA territory infarct status post decompressive left hemicraniectomy. Expected increased herniation through the craniotomy defect and stable 1-2 mm rightward midline shift.   - Incisional Care: Open to air. Wash incision daily with warm soapy water or shower daily, and pat dry with clean dry towel. Paint with CHG swab. - PRN Tylenol, Roxicodone and Dilaudid for pain  - Senokot-S & PRN Glycolax for bowel regimen  - Neurocritical Care following for neuro medical management and stroke w/u  Mobility: Advance as tolerates  Diet: Advance as tolerates  DVT Prophylaxis: SCD's & OK for chemoprophylaxis, if medically indicated  Will follow inpatient. Please call with any questions or decline in neurological status    DISPO: Remain inpatient from neurosurgery standpoint. Dispo timing to be determined by primary team once patient is medically stable for discharge. Patient was seen and examined with Dr. Juvenal Torres who agrees with above assessment and plan.      Electronically signed by: ZACARIAS Kaba CNP, APRN-CNP, 10/24/2023 9:19 AM  [phone]

## 2023-10-24 NOTE — PROGRESS NOTES
ICU Progress Note    Admit Date: 10/21/2023  Day: 4  Vent Day: 4  IV Access:Peripheral  IV Fluids:None  Vasopressors:None                Antibiotics:    Diet: Diet NPO  DIET TUBE FEED VOLUME BASED WITH DIET Other Tube Feeding (must specify product in comment) (Pivot); Orogastric; Continuous; 1,080; 24    CC: AMS    Interval history: Pt was seen and examined at bedside POD3. L  and movement of toes    HPI: Per 10/21 note  \"HPI: Lennox Shearer is a 42 yo female with no available documented medical history who presented from home to [facility] ED after family found her unconscious. Per ED note, her family states they believe she took multiple illicit drugs as well as large amounts of alcohol and had been sleeping for over 20 hours. EMS was called, gave Narcan with minimal response. On arrival to the ED she was nonverbal, was able to intermittently follow commands. GCS of 9 on arrival.      CT Head showed large left MCA infarct, 7 mm left-to-right midline shift  CTA Head Neck showed left cervical ICA, left intracranial ICA and left M1 MCA occlusions. ED paged the stroke and neurosurgery teams, who decided on immediate transfer to [facility] for further neurocritical care. Prior to transport the patient was intubated using etomidate and rocuronium for airway protection 2/2 GCS decline with decorticate posturing. Patient underwent hemicraniectomy on 10/22 after declining neuro exam and repeat head CT showing worsening edema, midline shift, and possible uncal herniation. \"    Medications:     Scheduled Meds:   sodium chloride flush  5-40 mL IntraVENous 2 times per day    atorvastatin  80 mg Orogastric Nightly    sennosides-docusate sodium  2 tablet Orogastric BID    amLODIPine  5 mg Orogastric Daily    polyethylene glycol  17 g Orogastric Daily    aspirin  81 mg Oral Daily    chlorhexidine  15 mL Mouth/Throat BID    famotidine (PEPCID) injection  20 mg IntraVENous BID    sodium chloride flush  5-40 mL

## 2023-10-24 NOTE — PROGRESS NOTES
10/24/23 1628   Encounter Summary   Encounter Overview/Reason  Spiritual/Emotional Needs   Service Provided For: Patient and family together  (intubated pt. Mother at bedside)   Referral/Consult From: Nurse   Support System Parent; Family members   Last Encounter  10/24/23  (conversation w/pt's mother, prayer ke 10/24/23)   Complexity of Encounter High   Begin Time 1600   End Time  1630   Total Time Calculated 30 min   Spiritual/Emotional needs   Type Emotional Distress;Spiritual Support   Assessment/Intervention/Outcome   Assessment Anxious; Concerns with suffering; Fearful;Tearful   Intervention Active listening;Discussed belief system/[employer]/abdulaziz;Discussed relationship with God;Nurtured Hope;Prayer (assurance of)/[place];Sustaining Presence/Ministry of presence   Outcome Concerns relieved;Expressed Gratitude     RevEric Solorio

## 2023-10-24 NOTE — PLAN OF CARE
Problem: Discharge Planning  Goal: Discharge to home or other facility with appropriate resources  10/24/2023 0108 by Nadia Medina RN  Outcome: Progressing  10/23/2023 1515 by Lindsey Lilly RN  Outcome: Progressing     Problem: Pain  Goal: Verbalizes/displays adequate comfort level or baseline comfort level  10/24/2023 0108 by Nadia Medina RN  Outcome: Progressing  10/23/2023 1515 by Lindsey Lilly RN  Outcome: Progressing     Problem: Skin/Tissue Integrity  Goal: Absence of new skin breakdown  Description: 1. Monitor for areas of redness and/or skin breakdown  2. Assess vascular access sites hourly  3. Every 4-6 hours minimum:  Change oxygen saturation probe site  4. Every 4-6 hours:  If on nasal continuous positive airway pressure, respiratory therapy assess nares and determine need for appliance change or resting period.   10/24/2023 0108 by Nadia Medina RN  Outcome: Progressing  10/23/2023 1515 by Lindsey Lilly RN  Outcome: Progressing     Problem: Safety - Adult  Goal: Free from fall injury  10/24/2023 0108 by Nadia Medina RN  Outcome: Progressing  10/23/2023 1515 by Lindsey Lilly RN  Outcome: Progressing  Flowsheets (Taken 10/23/2023 1512)  Free From Fall Injury: Instruct family/caregiver on patient safety     Problem: Nutrition Deficit:  Goal: Optimize nutritional status  10/24/2023 0108 by Nadia Medina RN  Outcome: Progressing  10/23/2023 1515 by Lindsey Lilly RN  Outcome: Progressing     Problem: Neurosensory - Adult  Goal: Achieves stable or improved neurological status  10/24/2023 0108 by Nadia Medina RN  Outcome: Progressing  10/23/2023 1515 by Lindsey Lilly RN  Outcome: Progressing  Goal: Achieves maximal functionality and self care  10/24/2023 0108 by Nadia Medina RN  Outcome: Progressing  10/23/2023 1515 by Lindsey Lilly RN  Outcome: Progressing     Problem: Chronic Conditions and Co-morbidities  Goal: Patient's chronic conditions and co-morbidity symptoms are monitored

## 2023-10-24 NOTE — PROGRESS NOTES
TF running at goal at this time.      Electronically signed by Edna Avila RN on 10/24/2023 at 5:45 AM

## 2023-10-24 NOTE — PROGRESS NOTES
V2.0    [facility] [place] [facility]      Name:  Keyona Shaffer /Age/Sex: 1980  (41 y.o. female)   MRN & CSN:  [id number] & [id number] Encounter Date/Time: 10/24/2023 6:35 AM EDT   Location:  [address] [place] [address] PCP: No primary care provider on file. 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 with midline shift:  Followed by neurosurgery and critical care team    Acute respiratory failure:  Her mental status decline and need to protect her airway  Early on FiO2 30% managed by critical care team  Remains on vent at this time     Microcytic anemia:  Trend hemoglobin 7.7 today     Polysubstance abuse disorder:  Positive for THC amphetamines  Noted in the past she has used ecstasy opioids and alcohol      Diet Diet NPO  DIET TUBE FEED VOLUME BASED WITH DIET Other Tube Feeding (must specify product in comment) (Pivot); Orogastric; Continuous; 1,080; 24   DVT Prophylaxis [] Lovenox, []  Heparin, [] SCDs, [] Ambulation,  [] Eliquis, [] Xarelto  [] Coumadin   Code Status Full Code       Surrogate Decision Maker/ POA       Personally reviewed Lab Studies and Imaging     Personally reviewed NS note          Subjective:     Chief Complaint: Alissa Marshall is a 41 y.o. female who presented from home to [facility] ED after family found her unconscious. Per ED note, her family states they believe she took multiple illicit drugs as well as large amounts of alcohol and had been sleeping for over 20 hours. EMS was called, gave Narcan with minimal response. On arrival to the ED she was nonverbal, was able to intermittently follow commands. GCS of 9 on arrival. At that time CT Head showed large left MCA infarct, 7 mm left-to-right midline shift CTA Head Neck showed left cervical ICA, left intracranial ICA and left M1 MCA occlusions. Prior to transport the patient was intubated.

## 2023-10-24 NOTE — PROGRESS NOTES
Assessment charted and completed. PM medications passed (see MAR). Pt intermittently follows commands on left. Seems to be aphasic and does not fully understand what is going on. Lungs clear over diminished, VSS. Tolerating vent in CPAP mode. PW repositioned. TF running at 20. Will turn up to 30 at 2130. No bm this shift thus far. Q1 hour neuros overnight. Standard safety measures in place.

## 2023-10-25 ENCOUNTER — PREP FOR PROCEDURE (OUTPATIENT)
Dept: INTERNAL MEDICINE CLINIC | Age: 43
End: 2023-10-25

## 2023-10-25 ENCOUNTER — APPOINTMENT (OUTPATIENT)
Dept: GENERAL RADIOLOGY | Age: 43
End: 2023-10-25
Attending: INTERNAL MEDICINE
Payer: COMMERCIAL

## 2023-10-25 LAB
ANION GAP SERPL CALCULATED.3IONS-SCNC: 10 MMOL/L (ref 3–16)
BACTERIA SPEC RESP CULT: ABNORMAL
BACTERIA SPEC RESP CULT: ABNORMAL
BUN SERPL-MCNC: 25 MG/DL (ref 7–20)
CALCIUM SERPL-MCNC: 8.9 MG/DL (ref 8.3–10.6)
CHLORIDE SERPL-SCNC: 121 MMOL/L (ref 99–110)
CO2 SERPL-SCNC: 28 MMOL/L (ref 21–32)
CREAT SERPL-MCNC: 0.8 MG/DL (ref 0.6–1.1)
DEPRECATED RDW RBC AUTO: 18.4 % (ref 12.4–15.4)
GFR SERPLBLD CREATININE-BSD FMLA CKD-EPI: >60 ML/MIN/{1.73_M2}
GLUCOSE SERPL-MCNC: 150 MG/DL (ref 70–99)
GRAM STN SPEC: ABNORMAL
HCT VFR BLD AUTO: 26.6 % (ref 36–48)
HGB BLD-MCNC: 7.8 G/DL (ref 12–16)
MCH RBC QN AUTO: 20.2 PG (ref 26–34)
MCHC RBC AUTO-ENTMCNC: 29.3 G/DL (ref 31–36)
MCV RBC AUTO: 69.1 FL (ref 80–100)
ORGANISM: ABNORMAL
PHOSPHATE SERPL-MCNC: 2.5 MG/DL (ref 2.5–4.9)
PLATELET # BLD AUTO: 394 K/UL (ref 135–450)
PMV BLD AUTO: 8 FL (ref 5–10.5)
POTASSIUM SERPL-SCNC: 3.6 MMOL/L (ref 3.5–5.1)
RBC # BLD AUTO: 3.85 M/UL (ref 4–5.2)
SODIUM SERPL-SCNC: 158 MMOL/L (ref 136–145)
SODIUM SERPL-SCNC: 158 MMOL/L (ref 136–145)
SODIUM SERPL-SCNC: 159 MMOL/L (ref 136–145)
SODIUM SERPL-SCNC: 160 MMOL/L (ref 136–145)
WBC # BLD AUTO: 14.8 K/UL (ref 4–11)

## 2023-10-25 PROCEDURE — 74018 RADEX ABDOMEN 1 VIEW: CPT

## 2023-10-25 PROCEDURE — A4216 STERILE WATER/SALINE, 10 ML: HCPCS

## 2023-10-25 PROCEDURE — 94003 VENT MGMT INPAT SUBQ DAY: CPT

## 2023-10-25 PROCEDURE — 36415 COLL VENOUS BLD VENIPUNCTURE: CPT

## 2023-10-25 PROCEDURE — 80048 BASIC METABOLIC PNL TOTAL CA: CPT

## 2023-10-25 PROCEDURE — 6370000000 HC RX 637 (ALT 250 FOR IP): Performed by: INTERNAL MEDICINE

## 2023-10-25 PROCEDURE — 85027 COMPLETE CBC AUTOMATED: CPT

## 2023-10-25 PROCEDURE — 6360000002 HC RX W HCPCS

## 2023-10-25 PROCEDURE — 99024 POSTOP FOLLOW-UP VISIT: CPT | Performed by: NURSE PRACTITIONER

## 2023-10-25 PROCEDURE — 6370000000 HC RX 637 (ALT 250 FOR IP)

## 2023-10-25 PROCEDURE — 2700000000 HC OXYGEN THERAPY PER DAY

## 2023-10-25 PROCEDURE — 84295 ASSAY OF SERUM SODIUM: CPT

## 2023-10-25 PROCEDURE — 2580000003 HC RX 258

## 2023-10-25 PROCEDURE — 94761 N-INVAS EAR/PLS OXIMETRY MLT: CPT

## 2023-10-25 PROCEDURE — APPNB30 APP NON BILLABLE TIME 0-30 MINS: Performed by: NURSE PRACTITIONER

## 2023-10-25 PROCEDURE — 2500000003 HC RX 250 WO HCPCS

## 2023-10-25 PROCEDURE — 99291 CRITICAL CARE FIRST HOUR: CPT | Performed by: INTERNAL MEDICINE

## 2023-10-25 PROCEDURE — 99291 CRITICAL CARE FIRST HOUR: CPT

## 2023-10-25 PROCEDURE — 71045 X-RAY EXAM CHEST 1 VIEW: CPT

## 2023-10-25 PROCEDURE — 2000000000 HC ICU R&B

## 2023-10-25 PROCEDURE — 84100 ASSAY OF PHOSPHORUS: CPT

## 2023-10-25 RX ORDER — OXYCODONE HYDROCHLORIDE 5 MG/1
5 TABLET ORAL EVERY 4 HOURS PRN
Status: DISCONTINUED | OUTPATIENT
Start: 2023-10-25 | End: 2023-11-04 | Stop reason: HOSPADM

## 2023-10-25 RX ORDER — ASPIRIN 81 MG/1
81 TABLET, CHEWABLE ORAL DAILY
Status: DISCONTINUED | OUTPATIENT
Start: 2023-10-26 | End: 2023-11-04 | Stop reason: HOSPADM

## 2023-10-25 RX ORDER — OXYCODONE HYDROCHLORIDE 5 MG/1
10 TABLET ORAL EVERY 4 HOURS PRN
Status: DISCONTINUED | OUTPATIENT
Start: 2023-10-25 | End: 2023-11-04 | Stop reason: HOSPADM

## 2023-10-25 RX ADMIN — SODIUM CHLORIDE, PRESERVATIVE FREE 10 ML: 5 INJECTION INTRAVENOUS at 20:04

## 2023-10-25 RX ADMIN — AMLODIPINE BESYLATE 5 MG: 5 TABLET ORAL at 08:50

## 2023-10-25 RX ADMIN — STANDARDIZED SENNA CONCENTRATE AND DOCUSATE SODIUM 2 TABLET: 8.6; 5 TABLET ORAL at 08:50

## 2023-10-25 RX ADMIN — ASPIRIN 81 MG: 81 TABLET, CHEWABLE ORAL at 08:50

## 2023-10-25 RX ADMIN — POLYETHYLENE GLYCOL 3350 17 G: 17 POWDER, FOR SOLUTION ORAL at 08:50

## 2023-10-25 RX ADMIN — ATORVASTATIN CALCIUM 80 MG: 80 TABLET, FILM COATED ORAL at 20:03

## 2023-10-25 RX ADMIN — DIBASIC SODIUM PHOSPHATE, MONOBASIC POTASSIUM PHOSPHATE AND MONOBASIC SODIUM PHOSPHATE 2 TABLET: 852; 155; 130 TABLET ORAL at 08:50

## 2023-10-25 RX ADMIN — SODIUM CHLORIDE, PRESERVATIVE FREE 10 ML: 5 INJECTION INTRAVENOUS at 08:51

## 2023-10-25 RX ADMIN — CHLORHEXIDINE GLUCONATE 15 ML: 1.2 RINSE ORAL at 08:51

## 2023-10-25 RX ADMIN — CHLORHEXIDINE GLUCONATE 15 ML: 1.2 RINSE ORAL at 20:04

## 2023-10-25 RX ADMIN — ENOXAPARIN SODIUM 40 MG: 100 INJECTION SUBCUTANEOUS at 08:50

## 2023-10-25 RX ADMIN — SODIUM CHLORIDE, PRESERVATIVE FREE 20 MG: 5 INJECTION INTRAVENOUS at 08:50

## 2023-10-25 RX ADMIN — LABETALOL HYDROCHLORIDE 10 MG: 5 INJECTION, SOLUTION INTRAVENOUS at 22:05

## 2023-10-25 RX ADMIN — STANDARDIZED SENNA CONCENTRATE AND DOCUSATE SODIUM 2 TABLET: 8.6; 5 TABLET ORAL at 20:03

## 2023-10-25 RX ADMIN — SODIUM CHLORIDE, PRESERVATIVE FREE 20 MG: 5 INJECTION INTRAVENOUS at 20:04

## 2023-10-25 ASSESSMENT — PULMONARY FUNCTION TESTS
PIF_VALUE: 16
PIF_VALUE: 13
PIF_VALUE: 8
PIF_VALUE: 15
PIF_VALUE: 13
PIF_VALUE: 13

## 2023-10-25 NOTE — CARE COORDINATION
Case Management Assessment  Initial Evaluation    Date/Time of Evaluation: 10/25/2023 9:08 AM  Assessment Completed by: Luana Messina RN    If patient is discharged prior to next notation, then this note serves as note for discharge by case management. Patient Name: Lasha Montes                   YOB: 1980  Diagnosis: Acute cerebrovascular accident (CVA) [facility]) [I63.9]  Acute ischemic left MCA stroke [facility]) [I63.512]                   Date / Time: 10/21/2023  5:17 AM    Patient Admission Status: Inpatient   Readmission Risk (Low < 19, Mod (19-27), High > 27): Readmission Risk Score: 16.2    Current PCP: No primary care provider on file. PCP verified by CM? No (no records listed in EPIC)    Chart Reviewed: Yes      History Provided by: Medical Record  Patient Orientation: Unable to Assess, Sedated    Patient Cognition: Other (see comment) (intubated and sedated at this time)    Hospitalization in the last 30 days (Readmission):  No    If yes, Readmission Assessment in CM Navigator will be completed. Advance Directives:      Code Status: Full Code   Patient's Primary Decision Maker is: Legal Next of Kin      Discharge Planning:    Patient lives with: Family Members Type of Home: House  Primary Care Giver: Self  Patient Support Systems include: Family Members   Current Financial resources: Medicaid ([facility] [address])  Current community resources: None  Current services prior to admission: None            Current DME:              Type of Home Care services:  None    ADLS  Prior functional level: Independent in ADLs/IADLs  Current functional level: Other (see comment) (YBD pending PTOT paola)    PT AM-PAC:   /24  OT AM-PAC:   /24    Family can provide assistance at DC: Other (comment) (tbd)  Would you like Case Management to discuss the discharge plan with any other family members/significant others, and if so, who?  Yes (mother listed in the EMR)  Plans to Return to Present Housing: Unknown at present  Potential Assistance needed at discharge: [facility]            Potential DME:  DEFERRED  Patient expects to discharge to: Unknown  Plan for transportation at discharge:  tbd    Financial    Payor: [address] [place] [address] / Plan: [address] [place] [address] / Product Type: *No Product type* /     Does insurance require precert for SNF: Yes    Potential assistance Purchasing Medications: No  Meds-to-Beds request: No      [facility] [address] [place] [address] [phone] Silvia Anderson [phone]  [facility] [address]  Phone: [phone] Fax: [phone]      Notes:    Factors facilitating achievement of predicted outcomes: Family support    Barriers to discharge: Medical complications    Additional Case Management Notes: Ms. Ambreen Ngo remains in the ICU intubated and sedated s/p decompressive kip-craniectomy. A call was placed to her mother, Chino Alvarado, to discuss history and potential post-acute care needs. Waiting to hear back.       Geraldo Bhatt RN  Case Management Department  [phone]

## 2023-10-25 NOTE — PROGRESS NOTES
Pt exam unchanged overnight. TF running at goal. No BM overnight.      Electronically signed by Primo Mendieta RN on 10/25/2023 at 6:40 AM

## 2023-10-25 NOTE — PLAN OF CARE
Problem: Discharge Planning  Goal: Discharge to home or other facility with appropriate resources  Outcome: Progressing     Problem: Pain  Goal: Verbalizes/displays adequate comfort level or baseline comfort level  Outcome: Progressing     Problem: Skin/Tissue Integrity  Goal: Absence of new skin breakdown  Description: 1. Monitor for areas of redness and/or skin breakdown  2. Assess vascular access sites hourly  3. Every 4-6 hours minimum:  Change oxygen saturation probe site  4. Every 4-6 hours:  If on nasal continuous positive airway pressure, respiratory therapy assess nares and determine need for appliance change or resting period.   Outcome: Progressing     Problem: Safety - Adult  Goal: Free from fall injury  Outcome: Progressing     Problem: Nutrition Deficit:  Goal: Optimize nutritional status  Outcome: Progressing     Problem: Neurosensory - Adult  Goal: Achieves stable or improved neurological status  Outcome: Progressing  Goal: Achieves maximal functionality and self care  Outcome: Progressing     Problem: Chronic Conditions and Co-morbidities  Goal: Patient's chronic conditions and co-morbidity symptoms are monitored and maintained or improved  Outcome: Progressing

## 2023-10-25 NOTE — PROGRESS NOTES
Scale Assessed: Yes (10/24/23 2000)   NIH Stroke Scale  NIH Stroke Scale Assessed: Yes  Interval: Hand-off/Transfer  Level of Consciousness (1a): Not alert, requires repeated stimulation to attend  LOC Questions (1b): Answers neither question correctly  LOC Commands (1c): Performs neither task correctly  Best Gaze (2): Normal  Visual (3): No visual loss  Facial Palsy (4): Normal symmetrical movement  Motor Arm, Left (5a): No drift  Motor Arm, Right (5b): No movement  Motor Leg, Left (6a): No drift  Motor Leg, Right (6b): No effort against gravity, limb falls  Limb Ataxia (7): Absent  Sensory (8): Normal  Best Language (9): Mute  Dysarthria (10): Intubated or other physical barrier (comment)  Extinction and Inattention (11): No abnormality  Total: 16        Patient intubated, off sedation. Purposeful movement on the left side. Intermittently will follow commands. 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     Plan:   -Will need stroke in the young w/u - hypercoag panel sent    -Vessel imaging: completed  -Echocardiogram with bubble completed, EF good, no obvious shunting but bubble study was difficult to obtain  -Nursing bedside swallow prior to any PO intake   -ASA 81mg PO daily   -High-intensity statin   -okay for DVT chemoprophylaxis   -PT/OT: eval and treat, PMR consult when/if appropriate  -ST: eval and treat and dysphagia screen when extubated  -BP goal <160  -Q2H neuro checks  -Q1H vital signs  -Telemetry   - On ventilator wean, ICU team planning to extubate when more awake  -Careful management of free water flushes. Please let sodium drift down slowly to prevent any rebound cerebral edema   - Call Neurology with any exam changes    Management and plan discussed with:  Nursing staff  Dr. Melo Ramírez/Neurosurgery team  ICU team  Patients family at bedside    ZACARIAS Kim - [place]   Neurology & Neurocritical Care   10/25/2023 10:45 AM    ICU Patients:   [facility]: [id number] [facility] / U Patients:  [facility]: [facility]    Critical Care:  Due to the immediate potential for life-threatening deterioration due to neurological failure, I spent 36 minutes providing critical care. This time excludes time spent performing procedures but includes time spent on direct patient care, history retrieval, review of the chart, and discussions with patient, family, and consultant(s).

## 2023-10-25 NOTE — PROGRESS NOTES
ICU Progress Note    Admit Date: 10/21/2023  Day: 5  Vent Day: 5  IV Access:Peripheral  IV Fluids:None  Vasopressors:None                Antibiotics:    Diet: Diet NPO  DIET TUBE FEED VOLUME BASED WITH DIET Other Tube Feeding (must specify product in comment) (Pivot); Orogastric; Continuous; 1,440; 24    CC: AMS    Interval history: Pt seen and examined at bedside, POD#4. Spontaneous L  and movement of L LE/toes    HPI: Per 10/21 note  \"HPI: Keyona Shaffer is a 42 yo female with no available documented medical history who presented from home to [facility] ED after family found her unconscious. Per ED note, her family states they believe she took multiple illicit drugs as well as large amounts of alcohol and had been sleeping for over 20 hours. EMS was called, gave Narcan with minimal response. On arrival to the ED she was nonverbal, was able to intermittently follow commands. GCS of 9 on arrival.      CT Head showed large left MCA infarct, 7 mm left-to-right midline shift  CTA Head Neck showed left cervical ICA, left intracranial ICA and left M1 MCA occlusions. ED paged the stroke and neurosurgery teams, who decided on immediate transfer to [address] [place] [address] for further neurocritical care. Prior to transport the patient was intubated using etomidate and rocuronium for airway protection 2/2 GCS decline with decorticate posturing. Patient underwent hemicraniectomy on 10/22 after declining neuro exam and repeat head CT showing worsening edema, midline shift, and possible uncal herniation. \"    Medications:     Scheduled Meds:   enoxaparin  40 mg SubCUTAneous Daily    phosphorus  500 mg Oral 4x Daily    magnesium sulfate  4,000 mg IntraVENous Once    sodium chloride flush  5-40 mL IntraVENous 2 times per day    atorvastatin  80 mg Orogastric Nightly    sennosides-docusate sodium  2 tablet Orogastric BID    amLODIPine  5 mg Orogastric Daily    polyethylene glycol  17 g Orogastric Daily    aspirin  81 mg Oral Daily

## 2023-10-25 NOTE — PROGRESS NOTES
V2.0    [facility] Progress Note      Name:  Palak Boateng /Age/Sex: 1980  (41 y.o. female)   MRN & CSN:  [id number] & [id number] Encounter Date/Time: 10/25/2023 6:35 AM EDT   Location:  [address] [place] [address] PCP: No primary care provider on file. Attending:James Ramos MD       Hospital Day: 5    Assessment and Recommendations       Assessment/Plan:       Large vessel occlusion CVA status post hemicraniectomy for cerebral edema with midline shift:  Followed by neurosurgery and critical care team Little change    Acute respiratory failure:  Her mental status decline and need to protect her airway  Early on FiO2 30% follwed by CC  Remains on vent at this time     Microcytic anemia:  Trend hemoglobin 7.8 today    Hypernatremia; Na 160  Continue to address fluids by CC    Polysubstance abuse disorder:  Positive for THC amphetamines  Noted in the past she has used ecstasy opioids and alcohol      Diet Diet NPO  DIET TUBE FEED VOLUME BASED WITH DIET Semi-elemental (Vital AF 1.2); Orogastric; Continuous; 1,800; 24   DVT Prophylaxis [] Lovenox, []  Heparin, [] SCDs, [] Ambulation,  [] Eliquis, [] Xarelto  [] Coumadin   Code Status Full Code       Surrogate Decision Maker/ POA       Personally reviewed Lab Studies and Imaging     Personally reviewed NS and CC  note          Subjective:     Chief Complaint: Jeanette Bella is a 41 y.o. female who presented from home to [facility] ED after family found her unconscious. Per ED note, her family states they believe she took multiple illicit drugs as well as large amounts of alcohol and had been sleeping for over 20 hours. EMS was called, gave Narcan with minimal response. On arrival to the ED she was nonverbal, was able to intermittently follow commands. GCS of 9 on arrival. At that time CT Head showed large left MCA infarct, 7 mm left-to-right midline shift CTA Head Neck showed left cervical ICA, left intracranial ICA and left M1 MCA occlusions.  Prior to ORBITS: The visualized portion of the orbits demonstrate no acute abnormality. SINUSES:  The visualized paranasal sinuses and mastoid air cells demonstrate no acute abnormality. SOFT TISSUES/SKULL: No acute abnormality of the visualized skull or soft tissues. CTA NECK: AORTIC ARCH/ARCH VESSELS: No dissection or arterial injury. No significant stenosis of the brachiocephalic or subclavian arteries. CAROTID ARTERIES: Occluded left cervical ICA (series 2, image 140). No dissection, arterial injury, or hemodynamically significant stenosis by NASCET criteria. VERTEBRAL ARTERIES: No dissection, arterial injury, or significant stenosis. SOFT TISSUES: The lung apices are clear. No cervical or superior mediastinal lymphadenopathy. The larynx and pharynx are unremarkable. No acute abnormality of the salivary and thyroid glands. BONES: No acute osseous abnormality. CTA HEAD: ANTERIOR CIRCULATION: Occluded left intracranial ICA, occluded left M1 MCA (series 2, image 205). Otherwise no significant stenosis of the intracranial internal carotid, anterior cerebral, or middle cerebral arteries. No aneurysm. POSTERIOR CIRCULATION: No significant stenosis of the vertebral, basilar, or posterior cerebral arteries. No aneurysm. OTHER: No dural venous sinus thrombosis on this non-dedicated study. 1. Large acute left MCA distribution infarct. 2. 7 mm left-to-right midline shift. 3. Left cervical ICA, left intracranial ICA, and the left M1 MCA are occluded. Critical results were called by Dr. Khushi Morin to Dr. Jada Thorne on 10/21/2023 at 03:05. XR CHEST PORTABLE    Result Date: 10/21/2023  EXAMINATION: ONE XRAY VIEW OF THE CHEST 10/20/2023 11:24 pm COMPARISON: None. HISTORY: ORDERING SYSTEM PROVIDED HISTORY: apnea TECHNOLOGIST PROVIDED HISTORY: Reason for exam:->apnea Reason for Exam: apnea FINDINGS: The heart is mildly enlarged.   There is overlying EKG lead and pacing artifact with pacing wires partially obscuring

## 2023-10-25 NOTE — PROGRESS NOTES
Comprehensive Nutrition Assessment    RECOMMENDATIONS:  Initiate Vital AF (peptide-based formula) at 60 mL/hr and as tolerated, increase to goal of 75 ml/hr after q4h. Flushes: 200 mL q4h  Nutrition Education: Education not appropriate     NUTRITION ASSESSMENT:   Nutritional summary & status: Pt remains intubated, no pressor or sedation requirements. TF currently at goal of 60 ml/hr. Switching TF formula to Vital AF d/t formula shortage. New formula decreases Na inake. Pt remains hypernatremic, Na 160. Discussed w/ neuro, new Na goal 145-155. Will continue to modify flushes. Water flushes increased w/ Na labs q6h. No BM x3 days, bowel regimen in place. Admission // PMH: CVA//stroke, polysubstance abuse, acute respiratory failure    MALNUTRITION ASSESSMENT  Context of Malnutrition: Acute Illness   Malnutrition Status: At risk for malnutrition (Comment)  Findings of the 6 clinical characteristics of malnutrition (Minimum of 2 out of 6 clinical characteristics is required to make the diagnosis of moderate or severe Protein Calorie Malnutrition based on AND/ASPEN Guidelines):  Energy Intake:  Mild decrease in energy intake (Comment)  Weight Loss:  No significant weight loss     Body Fat Loss:  No significant body fat loss     Muscle Mass Loss:  No significant muscle mass loss    Fluid Accumulation:  No significant fluid accumulation      NUTRITION DIAGNOSIS   Inadequate oral intake related to impaired respiratory function as evidenced by intubation    Nutrition Monitoring and Evaluation:   Food/Nutrient Intake Outcomes:  Enteral Nutrition Intake/Tolerance  Physical Signs/Symptoms Outcomes:  Biochemical Data, Nutrition Focused Physical Findings, Weight, Hemodynamic Status     OBJECTIVE DATA: Significant to nutrition assessment  Nutrition Related Findings: Na 160, , no BM since adm, active bowel sounds, no edema  Wounds: None  Nutrition Goals:  Tolerate nutrition support at goal rate, by next RD assessment

## 2023-10-25 NOTE — PLAN OF CARE
Problem: Discharge Planning  Goal: Discharge to home or other facility with appropriate resources  10/25/2023 0949 by Meng Barr RN  Outcome: Progressing  10/25/2023 0641 by Basilio Gilbert RN  Outcome: Progressing     Problem: Pain  Goal: Verbalizes/displays adequate comfort level or baseline comfort level  10/25/2023 0949 by Meng Barr RN  Outcome: Progressing  10/25/2023 0641 by Basilio Gilbert RN  Outcome: Progressing     Problem: Skin/Tissue Integrity  Goal: Absence of new skin breakdown  Description: 1. Monitor for areas of redness and/or skin breakdown  2. Assess vascular access sites hourly  3. Every 4-6 hours minimum:  Change oxygen saturation probe site  4. Every 4-6 hours:  If on nasal continuous positive airway pressure, respiratory therapy assess nares and determine need for appliance change or resting period.   10/25/2023 0949 by Meng Barr RN  Outcome: Progressing  10/25/2023 0641 by Basilio Gilbert RN  Outcome: Progressing     Problem: Safety - Adult  Goal: Free from fall injury  10/25/2023 0949 by Meng Barr RN  Outcome: Progressing  10/25/2023 0641 by Basilio Gilbert RN  Outcome: Progressing     Problem: Nutrition Deficit:  Goal: Optimize nutritional status  10/25/2023 0949 by Meng Barr RN  Outcome: Progressing  10/25/2023 0641 by Basilio Gilbert RN  Outcome: Progressing     Problem: Chronic Conditions and Co-morbidities  Goal: Patient's chronic conditions and co-morbidity symptoms are monitored and maintained or improved  10/25/2023 0949 by Meng Barr RN  Outcome: Progressing  10/25/2023 0641 by Basilio Gilbert RN  Outcome: Progressing

## 2023-10-26 ENCOUNTER — APPOINTMENT (OUTPATIENT)
Dept: GENERAL RADIOLOGY | Age: 43
End: 2023-10-26
Attending: INTERNAL MEDICINE
Payer: COMMERCIAL

## 2023-10-26 LAB
ANION GAP SERPL CALCULATED.3IONS-SCNC: 10 MMOL/L (ref 3–16)
B2 GLYCOPROT1 IGG SERPL IA-ACNC: <10 SGU
B2 GLYCOPROT1 IGM SERPL IA-ACNC: 14 SMU
BUN SERPL-MCNC: 28 MG/DL (ref 7–20)
CALCIUM SERPL-MCNC: 9.1 MG/DL (ref 8.3–10.6)
CARDIOLIPIN IGG SER IA-ACNC: <10 GPL
CARDIOLIPIN IGM SER IA-ACNC: 28 MPL
CHLORIDE SERPL-SCNC: 121 MMOL/L (ref 99–110)
CO2 SERPL-SCNC: 29 MMOL/L (ref 21–32)
CREAT SERPL-MCNC: 0.9 MG/DL (ref 0.6–1.1)
DEPRECATED RDW RBC AUTO: 18 % (ref 12.4–15.4)
GFR SERPLBLD CREATININE-BSD FMLA CKD-EPI: >60 ML/MIN/{1.73_M2}
GLUCOSE SERPL-MCNC: 139 MG/DL (ref 70–99)
HCT VFR BLD AUTO: 25.2 % (ref 36–48)
HGB BLD-MCNC: 7.5 G/DL (ref 12–16)
MCH RBC QN AUTO: 20.5 PG (ref 26–34)
MCHC RBC AUTO-ENTMCNC: 30 G/DL (ref 31–36)
MCV RBC AUTO: 68.4 FL (ref 80–100)
PLATELET # BLD AUTO: 382 K/UL (ref 135–450)
PMV BLD AUTO: 8.6 FL (ref 5–10.5)
POTASSIUM SERPL-SCNC: 3.7 MMOL/L (ref 3.5–5.1)
PROCALCITONIN SERPL IA-MCNC: 0.04 NG/ML (ref 0–0.15)
RBC # BLD AUTO: 3.67 M/UL (ref 4–5.2)
SODIUM SERPL-SCNC: 154 MMOL/L (ref 136–145)
SODIUM SERPL-SCNC: 158 MMOL/L (ref 136–145)
SODIUM SERPL-SCNC: 160 MMOL/L (ref 136–145)
WBC # BLD AUTO: 15.3 K/UL (ref 4–11)

## 2023-10-26 PROCEDURE — 6370000000 HC RX 637 (ALT 250 FOR IP)

## 2023-10-26 PROCEDURE — 80048 BASIC METABOLIC PNL TOTAL CA: CPT

## 2023-10-26 PROCEDURE — 99232 SBSQ HOSP IP/OBS MODERATE 35: CPT | Performed by: STUDENT IN AN ORGANIZED HEALTH CARE EDUCATION/TRAINING PROGRAM

## 2023-10-26 PROCEDURE — A4216 STERILE WATER/SALINE, 10 ML: HCPCS

## 2023-10-26 PROCEDURE — 94003 VENT MGMT INPAT SUBQ DAY: CPT

## 2023-10-26 PROCEDURE — 2500000003 HC RX 250 WO HCPCS

## 2023-10-26 PROCEDURE — 94761 N-INVAS EAR/PLS OXIMETRY MLT: CPT

## 2023-10-26 PROCEDURE — 6370000000 HC RX 637 (ALT 250 FOR IP): Performed by: INTERNAL MEDICINE

## 2023-10-26 PROCEDURE — 99024 POSTOP FOLLOW-UP VISIT: CPT | Performed by: NURSE PRACTITIONER

## 2023-10-26 PROCEDURE — 85027 COMPLETE CBC AUTOMATED: CPT

## 2023-10-26 PROCEDURE — 2000000000 HC ICU R&B

## 2023-10-26 PROCEDURE — 84295 ASSAY OF SERUM SODIUM: CPT

## 2023-10-26 PROCEDURE — 71045 X-RAY EXAM CHEST 1 VIEW: CPT

## 2023-10-26 PROCEDURE — 2700000000 HC OXYGEN THERAPY PER DAY

## 2023-10-26 PROCEDURE — APPNB30 APP NON BILLABLE TIME 0-30 MINS: Performed by: NURSE PRACTITIONER

## 2023-10-26 PROCEDURE — 2580000003 HC RX 258

## 2023-10-26 PROCEDURE — 6360000002 HC RX W HCPCS

## 2023-10-26 PROCEDURE — 84145 PROCALCITONIN (PCT): CPT

## 2023-10-26 PROCEDURE — 36415 COLL VENOUS BLD VENIPUNCTURE: CPT

## 2023-10-26 PROCEDURE — 99291 CRITICAL CARE FIRST HOUR: CPT | Performed by: INTERNAL MEDICINE

## 2023-10-26 RX ORDER — ENOXAPARIN SODIUM 100 MG/ML
30 INJECTION SUBCUTANEOUS 2 TIMES DAILY
Status: DISCONTINUED | OUTPATIENT
Start: 2023-10-27 | End: 2023-11-04 | Stop reason: HOSPADM

## 2023-10-26 RX ORDER — POLYETHYLENE GLYCOL 3350 17 G/17G
17 POWDER, FOR SOLUTION ORAL 2 TIMES DAILY
Status: CANCELLED | OUTPATIENT
Start: 2023-10-26

## 2023-10-26 RX ORDER — POLYETHYLENE GLYCOL 3350 17 G/17G
17 POWDER, FOR SOLUTION ORAL 2 TIMES DAILY
Status: DISCONTINUED | OUTPATIENT
Start: 2023-10-26 | End: 2023-11-04 | Stop reason: HOSPADM

## 2023-10-26 RX ADMIN — SODIUM CHLORIDE, PRESERVATIVE FREE 20 MG: 5 INJECTION INTRAVENOUS at 08:33

## 2023-10-26 RX ADMIN — STANDARDIZED SENNA CONCENTRATE AND DOCUSATE SODIUM 2 TABLET: 8.6; 5 TABLET ORAL at 20:20

## 2023-10-26 RX ADMIN — ENOXAPARIN SODIUM 40 MG: 100 INJECTION SUBCUTANEOUS at 08:33

## 2023-10-26 RX ADMIN — CHLORHEXIDINE GLUCONATE 15 ML: 1.2 RINSE ORAL at 20:20

## 2023-10-26 RX ADMIN — ASPIRIN 81 MG: 81 TABLET, CHEWABLE ORAL at 08:33

## 2023-10-26 RX ADMIN — CHLORHEXIDINE GLUCONATE 15 ML: 1.2 RINSE ORAL at 08:34

## 2023-10-26 RX ADMIN — SODIUM CHLORIDE, PRESERVATIVE FREE 10 ML: 5 INJECTION INTRAVENOUS at 20:21

## 2023-10-26 RX ADMIN — ACETAMINOPHEN 650 MG: 325 TABLET ORAL at 20:20

## 2023-10-26 RX ADMIN — SODIUM CHLORIDE, PRESERVATIVE FREE 10 ML: 5 INJECTION INTRAVENOUS at 09:04

## 2023-10-26 RX ADMIN — POLYETHYLENE GLYCOL 3350 17 G: 17 POWDER, FOR SOLUTION ORAL at 08:34

## 2023-10-26 RX ADMIN — AMLODIPINE BESYLATE 5 MG: 5 TABLET ORAL at 08:33

## 2023-10-26 RX ADMIN — SODIUM CHLORIDE, PRESERVATIVE FREE 20 MG: 5 INJECTION INTRAVENOUS at 20:21

## 2023-10-26 RX ADMIN — ATORVASTATIN CALCIUM 80 MG: 80 TABLET, FILM COATED ORAL at 20:21

## 2023-10-26 RX ADMIN — STANDARDIZED SENNA CONCENTRATE AND DOCUSATE SODIUM 2 TABLET: 8.6; 5 TABLET ORAL at 08:33

## 2023-10-26 RX ADMIN — POLYETHYLENE GLYCOL 3350 17 G: 17 POWDER, FOR SOLUTION ORAL at 20:21

## 2023-10-26 ASSESSMENT — PULMONARY FUNCTION TESTS
PIF_VALUE: 13
PIF_VALUE: 16
PIF_VALUE: 16

## 2023-10-26 NOTE — PROGRESS NOTES
NEUROSURGERY PROGRESS NOTE    10/26/2023 1:09 PM                               Melody Carroll                      LOS: 5 days   POD# 4 s/p Procedure(s) (LRB):  LEFT DECOMPRESSIVE FLORA-CRANIECTOMY (Left)    Subjective: Patient laying in bed upon entering the room. No acute events overnight. Patient remains on ventilator (CPAP mode). Physical Exam:  Patient seen and examined    Vitals:    10/26/23 1000   BP: 126/81   Pulse: 83   Resp: 19   Temp:    SpO2: 100%     GCS:  3 - Opens eyes to loud noise or command  1 - Makes no noise  6 - Follows simple motor commands  General: Well developed. Intubated and off sedation   HENT: ETT in place; Surgical incision to left side of scalp  Eyes: Optic discs: Not tested  Pulmonary: CPAP mode on ventilator  Cardiovascular:  Warm well perfused.  No peripheral edema  Gastrointestinal: abdomen soft, NT, ND    Neurological:  Mental Status: Lethargic, opens eyes to voice, ETT in place, moves some extremities to command  Attention: Follows some simple commands at times  Language: MEREDITH 2/2 ETT in place  Sensation: Intact to all extremities to light touch on left side of body and grimaces to noxious tactile stimuli on right side of body  Coordination: exam MEREDITH 2/2 lethargy    Cranial Nerves:  II: Visual acuity not tested, denies new visual changes / diplopia  III, IV, VI: PERRL, 3 mm bilaterally, EOM exam MEREDITH 2/2 lethargy  V: Facial sensation intact bilaterally to touch  VII: Face symmetric  VIII: Hearing intact bilaterally to spoken voice  IX: Palate movement exam MEREDITH 2/2 ETT in place  XI: Shoulder shrug exam MEREDITH 2/2 lethargy  XII: Tongue midline exam MEREDITH 2/2 ETT in place    Musculoskeletal:   Gait: Not tested   Assist devices: None   Tone: Normal  Motor strength: Exam limited 2/2 recent sedation and pain medication    LUE- Follows commands (Grasp hand ) with moderate strength, and does move independently against gravity  RUE- No movement to noxious tactile stimuli  LLE- Follows Diagnosis Date Noted    Acute ischemic left MCA stroke ([address]) 10/22/2023    Cerebrovascular accident (CVA) due to embolism of left middle cerebral artery ([address]) 10/21/2023    Cerebral edema ([address]) 10/21/2023    Brain compression ([address]) 10/21/2023    Acute respiratory failure with hypoxia ([address]) 10/21/2023       Assessment:  Patient is a 41 y.o. female w/AMS. CT Head showed a large completed left MCA ischemic infarct and left ICA and left MCA occlusion on CTA. Repeat imaging demonstrated evolving midline shift and brain edema, despite medical management including hypertonic therapy. After reviewing the treatment options, and after discussion with the critical care team, decompressive hemicraniectomy was recommended. Patient s/p LEFT DECOMPRESSIVE FLORA-CRANIECTOMY by Dr. Ana Pisano on 10/21/2023. Plan:  Neurologic exams frequency: Defer to Neurocritical Care  For change in exam MUST contact neurosurgery team along with critical care or primary team  Cerebrovascular accident (CVA) due to embolism of left middle cerebral artery (HCC)  - s/p LEFT DECOMPRESSIVE FLORA-CRANIECTOMY (Left)  - MRI Brain shows very large acute bland left MCA territory infarct status post decompressive left hemicraniectomy. Expected increased herniation through the craniotomy defect and stable 1-2 mm rightward midline shift.   - Incisional Care: Open to air. Wash incision daily with warm soapy water or shower daily, and pat dry with clean dry towel. Paint with CHG swab. - PRN Tylenol, Roxicodone and Dilaudid for pain  - Senokot-S & PRN Glycolax for bowel regimen  - Neurocritical Care following for neuro medical management and stroke w/u  Mobility: Advance as tolerates  Diet: Advance as tolerates  DVT Prophylaxis: SCD's & OK for chemoprophylaxis, if medically indicated  Will follow inpatient. Please call with any questions or decline in neurological status    DISPO: Remain inpatient from neurosurgery standpoint.    Patient was seen and

## 2023-10-26 NOTE — PLAN OF CARE
Problem: Discharge Planning  Goal: Discharge to home or other facility with appropriate resources  Outcome: Progressing     Problem: Pain  Goal: Verbalizes/displays adequate comfort level or baseline comfort level  Outcome: Progressing     Problem: Skin/Tissue Integrity  Goal: Absence of new skin breakdown  Description: 1. Monitor for areas of redness and/or skin breakdown  2. Assess vascular access sites hourly  3. Every 4-6 hours minimum:  Change oxygen saturation probe site  4. Every 4-6 hours:  If on nasal continuous positive airway pressure, respiratory therapy assess nares and determine need for appliance change or resting period. Outcome: Progressing     Problem: Safety - Adult  Goal: Free from fall injury  Outcome: Progressing     Problem: Nutrition Deficit:  Goal: Optimize nutritional status  Outcome: Progressing     Problem: Neurosensory - Adult  Goal: Achieves stable or improved neurological status  Outcome: Progressing  Goal: Achieves maximal functionality and self care  Outcome: Progressing     Problem: Chronic Conditions and Co-morbidities  Goal: Patient's chronic conditions and co-morbidity symptoms are monitored and maintained or improved  Outcome: Progressing     Problem: Safety - Medical Restraint  Goal: Remains free of injury from restraints (Restraint for Interference with Medical Device)  Description: INTERVENTIONS:  1. Determine that other, less restrictive measures have been tried or would not be effective before applying the restraint  2. Evaluate the patient's condition at the time of restraint application  3. Inform patient/family regarding the reason for restraint  4.  Q2H: Monitor safety, psychosocial status, comfort, nutrition and hydration  Outcome: Progressing  Flowsheets  Taken 10/26/2023 0400 by Gera Ferrell RN  Remains free of injury from restraints (restraint for interference with medical device):   Determine that other, less restrictive measures have been tried or would not

## 2023-10-26 NOTE — PROGRESS NOTES
ICU Progress Note    Admit Date: 10/21/2023  Day: 6  Vent Day: 6  IV Access:Peripheral  IV Fluids:None  Vasopressors:None                Antibiotics: s/p Ancef on 10/22  Diet: Diet NPO  DIET TUBE FEED VOLUME BASED WITH DIET Semi-elemental (Vital AF 1.2); Orogastric; Continuous; 1,800; 24    CC: AMS    Interval history: Pt seen and examined at bedside, POD#5. Spontaneous L  and movement of L LE/toes  L hand squeeze and L toes movement to command    HPI:  Per 10/21 note  \"HPI: Sisto Apgar is a 40 yo female with no available documented medical history who presented from home to [facility] ED after family found her unconscious. Per ED note, her family states they believe she took multiple illicit drugs as well as large amounts of alcohol and had been sleeping for over 20 hours. EMS was called, gave Narcan with minimal response. On arrival to the ED she was nonverbal, was able to intermittently follow commands. GCS of 9 on arrival.      CT Head showed large left MCA infarct, 7 mm left-to-right midline shift  CTA Head Neck showed left cervical ICA, left intracranial ICA and left M1 MCA occlusions. ED paged the stroke and neurosurgery teams, who decided on immediate transfer to [facility] for further neurocritical care. Prior to transport the patient was intubated using etomidate and rocuronium for airway protection 2/2 GCS decline with decorticate posturing. Patient underwent hemicraniectomy on 10/22 after declining neuro exam and repeat head CT showing worsening edema, midline shift, and possible uncal herniation. \"    Medications:     Scheduled Meds:   aspirin  81 mg Orogastric Daily    enoxaparin  40 mg SubCUTAneous Daily    sodium chloride flush  5-40 mL IntraVENous 2 times per day    atorvastatin  80 mg Orogastric Nightly    sennosides-docusate sodium  2 tablet Orogastric BID    amLODIPine  5 mg Orogastric Daily    polyethylene glycol  17 g Orogastric Daily    chlorhexidine  15 mL Mouth/Throat BID -> 158  -> 159 -> 160   TF intake Flush 1.280 + 1.425 L OG,    mL, I/O +2L     K wnl at 3.7  Cr stable at 0.9   Phos 2.5    Endo  Gluc 139    Heme  DVT prophylaxis - Lovenox 40 mg QD  Hg 9.7 -> 8.1 -> 7.7 -> 7.8 -> 7.5  MCV 69    Infectious   Afebrile throughout ICU stay   18.8 -> 17.4 -> 14.8 -> 15.3  Resp cx (+) for H influenzae    Code Status: Full Code   PPX: Lovenox, Pepcid   DISPO: ICU    Julia Kwan MD, PGY-1  Internal Medicine Resident  Contact via [facility]  10/26/23  6:47 AM    This patient has been staffed and discussed with Dr Hilaria Greene MD.     Patient was seen, examined and discussed with Dr. Antonette Gunter. I agree with the interval history. My physical exam confirms the findings listed below  Chart was reviewed including labs and medical records confirm the findings noted    Peripheral IV 10/20/23 Right Antecubital (Active)   Number of days: 5       Peripheral IV 10/21/23 Right; Anterior Forearm (Active)   Number of days: 5       Midline Single Lumen 10/23/23 Left Brachial (Active)   Number of days: 2     Acute respiratory  failure on mechanical vent support. , RR 16, FIO2 30, PEEP 5  Acute ischemic stroke. Large, involving Lt MCA distribution. Hypernatremia  Leukocytosis - trending down. There is some tracheal secretions. Increase free water to 400 mL q4h  Cxr with no airspace disease. WBC is stable. Tracheal secretions are still there but better. Keep holding ABX for now. Pt has a high probability of imminent or life-threatening deterioration requiring close monitoring, and highly complex decision-making and/or interventions of high intensity to assess, manipulate, and support his critical organ systems to prevent a likely inevitable decline which could occur if left untreated. A total critical care time 35 minutes was used.  This includes but not limited to examining patient, collaborating with other physicians, monitoring vital signs, telemetry,

## 2023-10-26 NOTE — PLAN OF CARE
Problem: Discharge Planning  Goal: Discharge to home or other facility with appropriate resources  10/26/2023 0929 by Evelyn Tang RN  Outcome: Progressing  Flowsheets (Taken 10/26/2023 0800)  Discharge to home or other facility with appropriate resources:   Identify barriers to discharge with patient and caregiver   Arrange for needed discharge resources and transportation as appropriate   Identify discharge learning needs (meds, wound care, etc)   Arrange for interpreters to assist at discharge as needed   Refer to discharge planning if patient needs post-hospital services based on physician order or complex needs related to functional status, cognitive ability or social support system  10/26/2023 0538 by Claudia Cullen RN  Outcome: Progressing     Problem: Pain  Goal: Verbalizes/displays adequate comfort level or baseline comfort level  10/26/2023 0929 by Evelyn Tang RN  Outcome: Progressing  Flowsheets (Taken 10/26/2023 0800)  Verbalizes/displays adequate comfort level or baseline comfort level:   Encourage patient to monitor pain and request assistance   Assess pain using appropriate pain scale   Administer analgesics based on type and severity of pain and evaluate response   Implement non-pharmacological measures as appropriate and evaluate response   Consider cultural and social influences on pain and pain management   Notify Licensed Independent Practitioner if interventions unsuccessful or patient reports new pain  10/26/2023 0538 by Claudia Cullen RN  Outcome: Progressing     Problem: Skin/Tissue Integrity  Goal: Absence of new skin breakdown  Description: 1. Monitor for areas of redness and/or skin breakdown  2. Assess vascular access sites hourly  3. Every 4-6 hours minimum:  Change oxygen saturation probe site  4. Every 4-6 hours:  If on nasal continuous positive airway pressure, respiratory therapy assess nares and determine need for appliance change or resting period.   10/26/2023 6104 the restraint   Every 2 hours: Monitor safety, psychosocial status, comfort, nutrition and hydration  Taken 10/25/2023 2000 by Nadia Medina RN  Remains free of injury from restraints (restraint for interference with medical device):   Determine that other, less restrictive measures have been tried or would not be effective before applying the restraint   Every 2 hours: Monitor safety, psychosocial status, comfort, nutrition and hydration  Taken 10/25/2023 1800 by Yennifer Dee RN  Remains free of injury from restraints (restraint for interference with medical device): Determine that other, less restrictive measures have been tried or would not be effective before applying the restraint  Taken 10/25/2023 1600 by Yennifer Dee RN  Remains free of injury from restraints (restraint for interference with medical device): Determine that other, less restrictive measures have been tried or would not be effective before applying the restraint     Problem: ABCDS Injury Assessment  Goal: Absence of physical injury  10/26/2023 0929 by Lizzie Dillon RN  Outcome: Progressing  Flowsheets (Taken 10/26/2023 0928)  Absence of Physical Injury: Implement safety measures based on patient assessment  10/26/2023 0538 by Nadia Medina RN  Outcome: Progressing

## 2023-10-26 NOTE — CARE COORDINATION
Chart review day 5- pt from home IPTA. left MCA stroke. Intubated, sedation off. Per Ida notes will try to extubate when more awake. Pt will need PT/OT, SLP when appropriate. CM will follow for DCP needs.

## 2023-10-26 NOTE — PROGRESS NOTES
Spoke w/ NCC NP. OK for Q2 hour neuro overnight.      Electronically signed by Beverly Galloway RN on 10/25/2023 at 9:18 PM

## 2023-10-26 NOTE — PROGRESS NOTES
V2.0    [facility] [place]      Name:  Ty Cea /Age/Sex: 1980  (41 y.o. female)   MRN & CSN:  [id number] & [id number] Encounter Date/Time: 10/26/2023 5:44 AM EDT   Location:  [address] [place] [address] PCP: No primary care provider on file. Attending:Lynsey Ramos MD       Hospital Day: 6    Assessment and Recommendations       Assessment/Plan:      Large vessel occlusion CVA status post hemicraniectomy for cerebral edema with midline shift:  Followed by neurosurgery and critical care team Little change  Case discussed with resident for critical care team    Acute metabolic encephalopathy:  Patient is a sodium of 160 in an effort to prevent further critical care team cerebral edema  Not clear if patient's altered mental status is related to her large CVA or her hyponatremia at this time     Acute respiratory failure:  Her mental status decline and need to protect her airway  Early on FiO2 30% follwed by CC  Remains on vent at this time to protect her airway     Microcytic anemia:  Trend hemoglobin 7.5 today  We will check iron studies in a.m. No overt signs of bleeding  Continue to trend     Hypernatremia; Na 160  Continue to monitor  Current limited free water per neurosurgery wishes  To try and prevent worsening cerebral edema    Hypercoagulability: Work-up in progress     Polysubstance abuse disorder:  Positive for THC amphetamines  Noted in the past she has used ecstasy opioids and alcohol      Diet Diet NPO  DIET TUBE FEED VOLUME BASED WITH DIET Semi-elemental (Vital AF 1.2);  Orogastric; Continuous; 1,800; 24   DVT Prophylaxis [] Lovenox, []  Heparin, [] SCDs, [] Ambulation,  [] Eliquis, [] Xarelto  [] Coumadin   Code Status Full Code       Surrogate Decision Maker/ POA       Personally reviewed Lab Studies and Imaging     Discussed management of the case with critical care team who recommended continue with hyponatremia management as in accordance with neurosurgery    Personally reviewed reduce the radiation dose to as low as reasonably achievable.; CTA of the head and neck was performed with the administration of intravenous contrast. Multiplanar reformatted images are provided for review. MIP images are provided for review. Stenosis of the internal carotid arteries measured using NASCET criteria. Automated exposure control, iterative reconstruction, and/or weight based adjustment of the mA/kV was utilized to reduce the radiation dose to as low as reasonably achievable. Noncontrast CT of the head with reconstructed 2-D images are also provided for review. COMPARISON: None. HISTORY: ORDERING SYSTEM PROVIDED HISTORY: ams TECHNOLOGIST PROVIDED HISTORY: Reason for exam:->ams Has a \"code stroke\" or \"stroke alert\" been called? ->No Decision Support Exception - unselect if not a suspected or confirmed emergency medical condition->Emergency Medical Condition (MA) Is the patient pregnant?->No Reason for Exam: ams; ORDERING SYSTEM PROVIDED HISTORY: ams TECHNOLOGIST PROVIDED HISTORY: Reason for exam:->ams Has a \"code stroke\" or \"stroke alert\" been called? ->No Decision Support Exception - unselect if not a suspected or confirmed emergency medical condition->Emergency Medical Condition (MA) Reason for Exam: ams FINDINGS: CT HEAD: BRAIN/VENTRICLES: Large acute left MCA infarct, loss of gray-white differentiation in the left frontal, left parietal, left temporal lobes, with sulcal effacement. Midline shift of approximately 7 mm left to right. Hyperattenuating left MCA, consistent with thrombus. No acute intracranial hemorrhage or extraaxial fluid collection. No evidence of mass, mass effect or midline shift. No evidence of hydrocephalus. ORBITS: The visualized portion of the orbits demonstrate no acute abnormality. SINUSES:  The visualized paranasal sinuses and mastoid air cells demonstrate no acute abnormality. SOFT TISSUES/SKULL: No acute abnormality of the visualized skull or soft tissues.  CTA NECK: AORTIC

## 2023-10-26 NOTE — PROGRESS NOTES
NEUROLOGY / NEUROCRITICAL CARE PROGRESS NOTE       Patient Name: Satnam Lopez YOB: 1980   Sex: Female Age: 41 yrs     CC / Reason for Consult: Left MCA stroke    Subjective / ROS / Updates over last 24 hours:  Patient is status post left hemicraniectomy  MRI of brain completed and showed full left MCA stroke  No new changes overnight   Friend at bedside reports she was nodding to pictures and picked out a ring to wear this morning      HISTORY     Allergies No Known Allergies   Diet Diet NPO  DIET TUBE FEED VOLUME BASED WITH DIET Semi-elemental (Vital AF 1.2); Orogastric; Continuous; 1,680; 24   Isolation No active isolations     LABS   Metabolic Panel Recent Labs     10/24/23  0441 10/24/23  1045 10/25/23  0456 10/25/23  1037 10/25/23  1926 10/26/23  0338 10/26/23  1137   *   < > 159*   < > 158* 160* 158*   K 4.2  --  3.6  --   --  3.7  --    *  --  121*  --   --  121*  --    CO2 25  --  28  --   --  29  --    BUN 25*  --  25*  --   --  28*  --    CREATININE 0.8  --  0.8  --   --  0.9  --    GLUCOSE 152*  --  150*  --   --  139*  --    CALCIUM 9.1  --  8.9  --   --  9.1  --    PHOS 1.8*  --  2.5  --   --   --   --    MG 2.60*  --   --   --   --   --   --     < > = values in this interval not displayed.         CBC / Coags Recent Labs     10/24/23  0441 10/25/23  0456 10/26/23  0339   WBC 17.4* 14.8* 15.3*   RBC 3.82* 3.85* 3.67*   HGB 7.7* 7.8* 7.5*   HCT 26.4* 26.6* 25.2*    394 382        Other Lab Results   Component Value Date/Time    LABA1C 5.6 10/23/2023 04:01 PM    LDLCALC 49 10/23/2023 04:01 PM    TRIG 87 10/23/2023 04:01 PM    LABSALI <0.3 10/20/2023 11:25 PM     Lab Results   Component Value Date/Time    LACTSEPSIS 1.4 10/20/2023 11:25 PM        CURRENT SCHEDULED MEDICATIONS   Inpatient Medications     polyethylene glycol, 17 g, Orogastric, BID    [START ON 10/27/2023] enoxaparin, 30 mg, SubCUTAneous, BID    aspirin, 81 mg, Orogastric, Daily    sodium chloride team    Time  [address] [facility] [place] Pair, [address] [facility]  [phone]      ICU Patients:   [facility]: [facility] Neurocritical Care    Floor / PCU Patients:  [facility]: [facility] Neurology

## 2023-10-27 ENCOUNTER — APPOINTMENT (OUTPATIENT)
Dept: GENERAL RADIOLOGY | Age: 43
End: 2023-10-27
Attending: INTERNAL MEDICINE
Payer: COMMERCIAL

## 2023-10-27 LAB
ANION GAP SERPL CALCULATED.3IONS-SCNC: 13 MMOL/L (ref 3–16)
BUN SERPL-MCNC: 29 MG/DL (ref 7–20)
CALCIUM SERPL-MCNC: 9.1 MG/DL (ref 8.3–10.6)
CHLORIDE SERPL-SCNC: 114 MMOL/L (ref 99–110)
CO2 SERPL-SCNC: 26 MMOL/L (ref 21–32)
CREAT SERPL-MCNC: 0.8 MG/DL (ref 0.6–1.1)
DEPRECATED RDW RBC AUTO: 18 % (ref 12.4–15.4)
GFR SERPLBLD CREATININE-BSD FMLA CKD-EPI: >60 ML/MIN/{1.73_M2}
GLUCOSE SERPL-MCNC: 133 MG/DL (ref 70–99)
HCT VFR BLD AUTO: 25 % (ref 36–48)
HGB BLD-MCNC: 7.6 G/DL (ref 12–16)
IRON SATN MFR SERPL: 7 % (ref 15–50)
IRON SERPL-MCNC: 24 UG/DL (ref 37–145)
MCH RBC QN AUTO: 20.6 PG (ref 26–34)
MCHC RBC AUTO-ENTMCNC: 30.4 G/DL (ref 31–36)
MCV RBC AUTO: 67.6 FL (ref 80–100)
PLATELET # BLD AUTO: 383 K/UL (ref 135–450)
PMV BLD AUTO: 8.4 FL (ref 5–10.5)
POTASSIUM SERPL-SCNC: 3.8 MMOL/L (ref 3.5–5.1)
RBC # BLD AUTO: 3.69 M/UL (ref 4–5.2)
SODIUM SERPL-SCNC: 153 MMOL/L (ref 136–145)
SODIUM SERPL-SCNC: 154 MMOL/L (ref 136–145)
TIBC SERPL-MCNC: 324 UG/DL (ref 260–445)
WBC # BLD AUTO: 19.1 K/UL (ref 4–11)

## 2023-10-27 PROCEDURE — 80048 BASIC METABOLIC PNL TOTAL CA: CPT

## 2023-10-27 PROCEDURE — 99231 SBSQ HOSP IP/OBS SF/LOW 25: CPT | Performed by: STUDENT IN AN ORGANIZED HEALTH CARE EDUCATION/TRAINING PROGRAM

## 2023-10-27 PROCEDURE — APPNB30 APP NON BILLABLE TIME 0-30 MINS: Performed by: NURSE PRACTITIONER

## 2023-10-27 PROCEDURE — 2500000003 HC RX 250 WO HCPCS

## 2023-10-27 PROCEDURE — 94761 N-INVAS EAR/PLS OXIMETRY MLT: CPT

## 2023-10-27 PROCEDURE — 74018 RADEX ABDOMEN 1 VIEW: CPT

## 2023-10-27 PROCEDURE — 6370000000 HC RX 637 (ALT 250 FOR IP): Performed by: INTERNAL MEDICINE

## 2023-10-27 PROCEDURE — 83550 IRON BINDING TEST: CPT

## 2023-10-27 PROCEDURE — A4216 STERILE WATER/SALINE, 10 ML: HCPCS

## 2023-10-27 PROCEDURE — 36415 COLL VENOUS BLD VENIPUNCTURE: CPT

## 2023-10-27 PROCEDURE — 2580000003 HC RX 258: Performed by: INTERNAL MEDICINE

## 2023-10-27 PROCEDURE — 83540 ASSAY OF IRON: CPT

## 2023-10-27 PROCEDURE — 2580000003 HC RX 258

## 2023-10-27 PROCEDURE — 2000000000 HC ICU R&B

## 2023-10-27 PROCEDURE — 84295 ASSAY OF SERUM SODIUM: CPT

## 2023-10-27 PROCEDURE — 85027 COMPLETE CBC AUTOMATED: CPT

## 2023-10-27 PROCEDURE — 6370000000 HC RX 637 (ALT 250 FOR IP)

## 2023-10-27 PROCEDURE — 6360000002 HC RX W HCPCS: Performed by: INTERNAL MEDICINE

## 2023-10-27 PROCEDURE — 99024 POSTOP FOLLOW-UP VISIT: CPT | Performed by: NURSE PRACTITIONER

## 2023-10-27 PROCEDURE — 6360000002 HC RX W HCPCS: Performed by: NURSE PRACTITIONER

## 2023-10-27 PROCEDURE — 99232 SBSQ HOSP IP/OBS MODERATE 35: CPT | Performed by: INTERNAL MEDICINE

## 2023-10-27 RX ADMIN — SODIUM CHLORIDE, PRESERVATIVE FREE 10 ML: 5 INJECTION INTRAVENOUS at 20:58

## 2023-10-27 RX ADMIN — POLYETHYLENE GLYCOL 3350 17 G: 17 POWDER, FOR SOLUTION ORAL at 20:57

## 2023-10-27 RX ADMIN — ACETAMINOPHEN 650 MG: 325 TABLET ORAL at 21:07

## 2023-10-27 RX ADMIN — ENOXAPARIN SODIUM 30 MG: 100 INJECTION SUBCUTANEOUS at 09:14

## 2023-10-27 RX ADMIN — CHLORHEXIDINE GLUCONATE 15 ML: 1.2 RINSE ORAL at 20:57

## 2023-10-27 RX ADMIN — SODIUM CHLORIDE, PRESERVATIVE FREE 20 MG: 5 INJECTION INTRAVENOUS at 09:14

## 2023-10-27 RX ADMIN — STANDARDIZED SENNA CONCENTRATE AND DOCUSATE SODIUM 2 TABLET: 8.6; 5 TABLET ORAL at 20:57

## 2023-10-27 RX ADMIN — IRON SUCROSE 200 MG: 20 INJECTION, SOLUTION INTRAVENOUS at 16:01

## 2023-10-27 RX ADMIN — ATORVASTATIN CALCIUM 80 MG: 80 TABLET, FILM COATED ORAL at 20:58

## 2023-10-27 RX ADMIN — SODIUM CHLORIDE, PRESERVATIVE FREE 20 MG: 5 INJECTION INTRAVENOUS at 20:58

## 2023-10-27 RX ADMIN — ENOXAPARIN SODIUM 30 MG: 100 INJECTION SUBCUTANEOUS at 20:58

## 2023-10-27 NOTE — PLAN OF CARE
Problem: Discharge Planning  Goal: Discharge to home or other facility with appropriate resources  Outcome: Progressing  Flowsheets (Taken 10/26/2023 2000)  Discharge to home or other facility with appropriate resources: Identify barriers to discharge with patient and caregiver     Problem: Pain  Goal: Verbalizes/displays adequate comfort level or baseline comfort level  Outcome: Progressing     Problem: Skin/Tissue Integrity  Goal: Absence of new skin breakdown  Description: 1. Monitor for areas of redness and/or skin breakdown  2. Assess vascular access sites hourly  3. Every 4-6 hours minimum:  Change oxygen saturation probe site  4. Every 4-6 hours:  If on nasal continuous positive airway pressure, respiratory therapy assess nares and determine need for appliance change or resting period.   Outcome: Progressing     Problem: Safety - Adult  Goal: Free from fall injury  Outcome: Progressing  Flowsheets (Taken 10/26/2023 2000)  Free From Fall Injury: Instruct family/caregiver on patient safety     Problem: Nutrition Deficit:  Goal: Optimize nutritional status  Outcome: Progressing     Problem: Neurosensory - Adult  Goal: Achieves stable or improved neurological status  Outcome: Progressing  Flowsheets (Taken 10/26/2023 2000)  Achieves stable or improved neurological status: Assess for and report changes in neurological status  Goal: Achieves maximal functionality and self care  Outcome: Progressing     Problem: Chronic Conditions and Co-morbidities  Goal: Patient's chronic conditions and co-morbidity symptoms are monitored and maintained or improved  Outcome: Progressing  Flowsheets (Taken 10/26/2023 2000)  Care Plan - Patient's Chronic Conditions and Co-Morbidity Symptoms are Monitored and Maintained or Improved: Monitor and assess patient's chronic conditions and comorbid symptoms for stability, deterioration, or improvement     Problem: Safety - Medical Restraint  Goal: Remains free of injury from Progressing  Flowsheets (Taken 10/26/2023 2000)  Absence of Physical Injury: Implement safety measures based on patient assessment

## 2023-10-27 NOTE — PROGRESS NOTES
NEUROSURGERY PROGRESS NOTE    10/27/2023 1:08 PM                               Melody Carroll                      LOS: 6 days   POD# 5 s/p Procedure(s) (LRB):  LEFT DECOMPRESSIVE FLORA-CRANIECTOMY (Left)    Subjective: Pt off the vent. She extubated her self. Physical Exam:  Patient seen and examined    Vitals:    10/27/23 1000   BP: (!) 142/97   Pulse: 99   Resp: 24   Temp:    SpO2: 97%     GCS:  3 - Opens eyes to loud noise or command and will track  1 - Makes no noise  6 - Follows some simple motor commands in LUE  General: Well developed. HENT: Surgical incision to left side of scalp  Eyes: Optic discs: Not tested  Pulmonary: respirations unlabored  Cardiovascular:  Warm well perfused.  No peripheral edema  Gastrointestinal: abdomen soft, NT, ND    Neurological:  Mental Status: alert opens eyes to voice, moves IKER extremity to command  Attention: Follows some simple commands at times  Language: nonverbal  Sensation: Intact to all extremities to light touch on left side of body and grimaces to noxious tactile stimuli on right side of body  Coordination: exam MEREDITH     Cranial Nerves:  II: Visual acuity not tested, denies new visual changes / diplopia  III, IV, VI: PERRL, 3 mm bilaterally, EOM exam MEREDITH   V: Facial sensation intact bilaterally to touch  VII: Face symmetric  VIII: Hearing intact bilaterally to spoken voice  IX: Palate movement exam MEREDITH   XI: Shoulder shrug exam MEREDITH   XII: Tongue midline exam MEREDITH     Musculoskeletal:   Gait: Not tested   Assist devices: None   Tone: Normal  Motor strength: Exam limited 2/2 not following commands, will squeeze with L hand to command and spont moves Left leg but not to command  LUE- Follows commands (Grasp hand ) with moderate strength, and does move independently against gravity  RUE- No movement to noxious tactile stimuli  LLE- spont moves, and does move independently against gravity  RLE- No movement to noxious tactile stimuli    Incision:

## 2023-10-27 NOTE — SIGNIFICANT EVENT
Patient inadvertently self extubated while BUE were restrained. She immediately had mouth secretions which were suctioned. She was not in respiratory distress. Pt was sleeping comfortably and did not appear to be in any acute distress. She rouses to loud voice but is otherwise very somnolent and does not respond verbally. Pt w/ crackles diffusely and sounds congested, otherwise doing well. She is not on sedation. Pt oxygenating well at 99%, HR 100s, RR 19, /82. Will monitor for changes in respiratory status.       Valentin Robles MD  3:02 AM  10/27/23

## 2023-10-27 NOTE — PROGRESS NOTES
Attempted corpak placement x3. Unsuccessful on all attempts due to lack of patient cooperation. Will allow patient to rest to prevent further agitation before attempting again.

## 2023-10-27 NOTE — PLAN OF CARE
Problem: Discharge Planning  Goal: Discharge to home or other facility with appropriate resources  10/27/2023 0957 by Alia Lea RN  Outcome: Progressing  Flowsheets (Taken 10/27/2023 0800)  Discharge to home or other facility with appropriate resources:   Identify barriers to discharge with patient and caregiver   Arrange for needed discharge resources and transportation as appropriate   Identify discharge learning needs (meds, wound care, etc)   Arrange for interpreters to assist at discharge as needed   Refer to discharge planning if patient needs post-hospital services based on physician order or complex needs related to functional status, cognitive ability or social support system  10/27/2023 0216 by Mary Du RN  Outcome: Progressing  Flowsheets (Taken 10/26/2023 2000)  Discharge to home or other facility with appropriate resources: Identify barriers to discharge with patient and caregiver     Problem: Pain  Goal: Verbalizes/displays adequate comfort level or baseline comfort level  10/27/2023 0957 by Alia Lea RN  Outcome: Progressing  Flowsheets (Taken 10/27/2023 0800)  Verbalizes/displays adequate comfort level or baseline comfort level:   Encourage patient to monitor pain and request assistance   Assess pain using appropriate pain scale   Implement non-pharmacological measures as appropriate and evaluate response   Administer analgesics based on type and severity of pain and evaluate response   Consider cultural and social influences on pain and pain management   Notify Licensed Independent Practitioner if interventions unsuccessful or patient reports new pain  10/27/2023 0216 by Mary Du RN  Outcome: Progressing     Problem: Skin/Tissue Integrity  Goal: Absence of new skin breakdown  Description: 1. Monitor for areas of redness and/or skin breakdown  2. Assess vascular access sites hourly  3. Every 4-6 hours minimum:  Change oxygen saturation probe site  4. Every 4-6 hours:   If restrictive measures have been tried or would not be effective before applying the restraint   Evaluate the patient's condition at the time of restraint application   Inform patient/family regarding the reason for restraint   Every 2 hours: Monitor safety, psychosocial status, comfort, nutrition and hydration  Taken 10/26/2023 1600 by Rm Lloyd RN  Remains free of injury from restraints (restraint for interference with medical device):   Determine that other, less restrictive measures have been tried or would not be effective before applying the restraint   Evaluate the patient's condition at the time of restraint application   Inform patient/family regarding the reason for restraint   Every 2 hours: Monitor safety, psychosocial status, comfort, nutrition and hydration  Taken 10/26/2023 1400 by Rm Lloyd RN  Remains free of injury from restraints (restraint for interference with medical device):   Determine that other, less restrictive measures have been tried or would not be effective before applying the restraint   Evaluate the patient's condition at the time of restraint application   Inform patient/family regarding the reason for restraint   Every 2 hours: Monitor safety, psychosocial status, comfort, nutrition and hydration     Problem: ABCDS Injury Assessment  Goal: Absence of physical injury  10/27/2023 0957 by Claus Dooley RN  Outcome: Progressing  10/27/2023 0216 by Severiano Imus, RN  Outcome: Progressing  Flowsheets (Taken 10/26/2023 2000)  Absence of Physical Injury: Implement safety measures based on patient assessment

## 2023-10-27 NOTE — PROGRESS NOTES
V2.0    [facility] Progress Note      Name:  Griffin Urbina /Age/Sex: 1980  (41 y.o. female)   MRN & CSN:  [id number] & [id number] Encounter Date/Time: 10/27/2023 5:44 AM EDT   Location:  [facility]95[facility] PCP: No primary care provider on file. Manny Kemp MD       Hospital Day: 7    Assessment and Recommendations       Assessment/Plan:      Large vessel occlusion CVA status post hemicraniectomy for cerebral edema with midline shift:  Followed by neurosurgery and critical care team Little change    Acute respiratory failure:  Self extubated   This was discussed with the resident from critical care team  Will see how she does extubated at this time    Microcytic anemia:  Trend hemoglobin 7.6 today  Iron studies show low iron  We will start IV Venofer  No overt signs of bleeding  Continue to trend     Hypernatremia; Na 153  continue to monitor  Current limited free water per neurosurgery wishes  To try and prevent worsening cerebral edema    Hypercoagulability: Work-up in progress     Polysubstance abuse disorder:  Positive for THC amphetamines  Noted in the past she has used ecstasy opioids and alcohol      Diet Diet NPO  DIET TUBE FEED VOLUME BASED WITH DIET STANDARD WITH FIBER (Jevity 1.5); Nasogastric; Continuous; 1,080; 24   DVT Prophylaxis [] Lovenox, []  Heparin, [] SCDs, [] Ambulation,  [] Eliquis, [] Xarelto  [] Coumadin   Code Status Full Code       Surrogate Decision Maker/ POA       Personally reviewed Lab Studies and Imaging     Discussed management of the case with critical care team who recommended continue with patient self extubated we will see how she does     personally reviewed neurosurgery note          Subjective:     Chief Complaint:  Jeffrey Faustin is a 41 y.o. female who presented from home to [facility] ED after family found her unconscious.  Per ED note, her family states they believe she took multiple illicit drugs as well as large amounts of alcohol and

## 2023-10-27 NOTE — PROGRESS NOTES
Pt while still restrained managed to self extubate. Then placed on 2L NC, sats at 99%, , tolerating, care ongoing.

## 2023-10-27 NOTE — PROGRESS NOTES
Fiber  Schedule: Continuous  Additives/Modulars: Protein (+1 Prosource daily)  Water Flushes: 400 mL q4  Goal TF & Flush Orders Provides: Jevity 1.5 @ 45 mL/hr to provide: 1080 mL TV, 1620 kcal, 69 g/pro and 821 mL FW + 1 Prosource (20 g/pro + 80 kcal)  PO Intake: NPO   PO Supplement Intake:NPO  Additional Sources of Calories/IVF:N/A     COMPARATIVE STANDARDS  Energy (kcal):  4622-7158 (12-15 kcal/kg CBW)     Protein (g):  72-96 (0.6-0.8 g/kg CBW)       Fluid (ml/day):  1 mL/kcal or per MD    ANTHROPOMETRICS  Current Height: 177.8 cm (5' 10\")  Current Weight - Scale: 119.5 kg (263 lb 7.2 oz)    Admission weight: 119.5 kg (263 lb 7.2 oz)    The patient will be monitored per nutrition standards of care. Consult dietitian if additional nutrition interventions are needed prior to RD reassessment.      Delma Whatley, [address] [facility], [address] [place] [address] Avenue:  [phone]

## 2023-10-27 NOTE — PROGRESS NOTES
ICU Progress Note    Admit Date: 10/21/2023  Day: 7  Vent Day: s/p self extubation  IV Access:Peripheral  IV Fluids:None  Vasopressors:None                Antibiotics: s/p Ancef on 10/22  Diet: Diet NPO  DIET TUBE FEED VOLUME BASED WITH DIET Semi-elemental (Vital AF 1.2); Orogastric; Continuous; 1,680; 24    CC: AMS    Interval history: Pt seen and examined at bedside, POD#6  Spontaneous L  and movement of L LE/toes  L hand squeeze     HPI: Per 10/21 note  \"HPI: Dayron Heath is a 40 yo female with no available documented medical history who presented from home to [facility] ED after family found her unconscious. Per ED note, her family states they believe she took multiple illicit drugs as well as large amounts of alcohol and had been sleeping for over 20 hours. EMS was called, gave Narcan with minimal response. On arrival to the ED she was nonverbal, was able to intermittently follow commands. GCS of 9 on arrival.      CT Head showed large left MCA infarct, 7 mm left-to-right midline shift  CTA Head Neck showed left cervical ICA, left intracranial ICA and left M1 MCA occlusions. ED paged the stroke and neurosurgery teams, who decided on immediate transfer to [facility] for further neurocritical care. Prior to transport the patient was intubated using etomidate and rocuronium for airway protection 2/2 GCS decline with decorticate posturing. Patient underwent hemicraniectomy on 10/22 after declining neuro exam and repeat head CT showing worsening edema, midline shift, and possible uncal herniation. \"    Medications:     Scheduled Meds:   polyethylene glycol  17 g Orogastric BID    enoxaparin  30 mg SubCUTAneous BID    aspirin  81 mg Orogastric Daily    sodium chloride flush  5-40 mL IntraVENous 2 times per day    atorvastatin  80 mg Orogastric Nightly    sennosides-docusate sodium  2 tablet Orogastric BID    amLODIPine  5 mg Orogastric Daily    chlorhexidine  15 mL Mouth/Throat BID    famotidine (PEPCID) Stable cardiomegaly. XR ABDOMEN (KUB) (SINGLE AP VIEW)   Final Result   1. Nasogastric tube tip projects at the level of the gastric body. MRI BRAIN WO CONTRAST   Final Result      1. Very large acute bland left MCA territory infarct status post decompressive   left hemicraniectomy. Expected increased herniation through the craniotomy   defect and stable 1-2 mm rightward midline shift. XR ABDOMEN (KUB) (SINGLE AP VIEW)   Final Result      The tip of the orogastric tube projects over the gastric body. XR ABDOMEN (KUB) (SINGLE AP VIEW)   Final Result      Recommend advancing nasogastric tube an additional 4-5 cm for more optimal   positioning. CT HEAD WO CONTRAST   Final Result      1. Status post left hemicraniectomy without evidence of complication. Decreased   1 mm rightward midline shift. CT HEAD WO CONTRAST   Final Result      1. Expected evolution of large left MCA territory infarct. No acute hemorrhage. 2.  Persistent hyperdense left MCA consistent with thrombosis as seen on CTA. 3.  Increased 8 mm rightward midline shift, previously 5 mm 14 hours prior, and   suspected mild left uncal herniation. Recommend neurosurgical consultation. Discussed with charge nurse. XR CHEST PORTABLE   Final Result      Asymmetric breast shadow over the left chest with no definite underlying left   lung airspace disease. Stable mild cardiomegaly. Assessment/Plan:   Harley Underwood is a 41 y.o. female  who presented from [address] w large ischemic stroke and admitted to ICU for management.      Neuro  Weaned from Fentanyl & Propofol     Spontaneous L  and movement of L LE/toes  Moves L toes and squeeze L hand on command     Pulmonary  Self extubated   Initially on 2 L NC  SpO2 95 on RA     Cardiovascular  /97 (107)     Lipitor 80   ASA 81  Amlodipine 5   Nicardipine ggt scheduled, weaned    GI  Diet: Diet NPO  DIET TUBE FEED VOLUME BASED WITH DIET

## 2023-10-28 ENCOUNTER — APPOINTMENT (OUTPATIENT)
Dept: GENERAL RADIOLOGY | Age: 43
End: 2023-10-28
Attending: INTERNAL MEDICINE
Payer: COMMERCIAL

## 2023-10-28 LAB
ANION GAP SERPL CALCULATED.3IONS-SCNC: 14 MMOL/L (ref 3–16)
BUN SERPL-MCNC: 30 MG/DL (ref 7–20)
CALCIUM SERPL-MCNC: 9.3 MG/DL (ref 8.3–10.6)
CHLORIDE SERPL-SCNC: 114 MMOL/L (ref 99–110)
CO2 SERPL-SCNC: 26 MMOL/L (ref 21–32)
CREAT SERPL-MCNC: 0.9 MG/DL (ref 0.6–1.1)
DEPRECATED RDW RBC AUTO: 18.7 % (ref 12.4–15.4)
F5 P.R506Q BLD/T QL: NEGATIVE
GFR SERPLBLD CREATININE-BSD FMLA CKD-EPI: >60 ML/MIN/{1.73_M2}
GLUCOSE SERPL-MCNC: 134 MG/DL (ref 70–99)
HCT VFR BLD AUTO: 29.3 % (ref 36–48)
HGB BLD-MCNC: 8.6 G/DL (ref 12–16)
MCH RBC QN AUTO: 20.6 PG (ref 26–34)
MCHC RBC AUTO-ENTMCNC: 29.2 G/DL (ref 31–36)
MCV RBC AUTO: 70.7 FL (ref 80–100)
MTHFR C.1298A>C GENO BLD/T: NORMAL
MTHFR C.677C>T GENO BLD/T: NORMAL
MTHFR GENE MUT ANL BLD/T: NORMAL
PLATELET # BLD AUTO: 322 K/UL (ref 135–450)
PMV BLD AUTO: 8.7 FL (ref 5–10.5)
POTASSIUM SERPL-SCNC: 3.8 MMOL/L (ref 3.5–5.1)
RBC # BLD AUTO: 4.15 M/UL (ref 4–5.2)
SODIUM SERPL-SCNC: 151 MMOL/L (ref 136–145)
SODIUM SERPL-SCNC: 154 MMOL/L (ref 136–145)
SPECIMEN SOURCE: NORMAL
SPECIMEN SOURCE: NORMAL
WBC # BLD AUTO: 16.8 K/UL (ref 4–11)

## 2023-10-28 PROCEDURE — 84295 ASSAY OF SERUM SODIUM: CPT

## 2023-10-28 PROCEDURE — 80048 BASIC METABOLIC PNL TOTAL CA: CPT

## 2023-10-28 PROCEDURE — 94761 N-INVAS EAR/PLS OXIMETRY MLT: CPT

## 2023-10-28 PROCEDURE — 97112 NEUROMUSCULAR REEDUCATION: CPT

## 2023-10-28 PROCEDURE — 99232 SBSQ HOSP IP/OBS MODERATE 35: CPT | Performed by: INTERNAL MEDICINE

## 2023-10-28 PROCEDURE — 2060000000 HC ICU INTERMEDIATE R&B

## 2023-10-28 PROCEDURE — 6360000002 HC RX W HCPCS: Performed by: NURSE PRACTITIONER

## 2023-10-28 PROCEDURE — 6360000002 HC RX W HCPCS

## 2023-10-28 PROCEDURE — 97530 THERAPEUTIC ACTIVITIES: CPT

## 2023-10-28 PROCEDURE — 97110 THERAPEUTIC EXERCISES: CPT

## 2023-10-28 PROCEDURE — 85027 COMPLETE CBC AUTOMATED: CPT

## 2023-10-28 PROCEDURE — 2500000003 HC RX 250 WO HCPCS

## 2023-10-28 PROCEDURE — 74018 RADEX ABDOMEN 1 VIEW: CPT

## 2023-10-28 PROCEDURE — 6370000000 HC RX 637 (ALT 250 FOR IP)

## 2023-10-28 PROCEDURE — 6370000000 HC RX 637 (ALT 250 FOR IP): Performed by: INTERNAL MEDICINE

## 2023-10-28 PROCEDURE — 36415 COLL VENOUS BLD VENIPUNCTURE: CPT

## 2023-10-28 PROCEDURE — 6360000002 HC RX W HCPCS: Performed by: INTERNAL MEDICINE

## 2023-10-28 PROCEDURE — 2580000003 HC RX 258

## 2023-10-28 PROCEDURE — 2580000003 HC RX 258: Performed by: INTERNAL MEDICINE

## 2023-10-28 PROCEDURE — 97163 PT EVAL HIGH COMPLEX 45 MIN: CPT

## 2023-10-28 PROCEDURE — 97167 OT EVAL HIGH COMPLEX 60 MIN: CPT

## 2023-10-28 RX ORDER — FAMOTIDINE 20 MG/1
20 TABLET, FILM COATED ORAL 2 TIMES DAILY
Status: DISCONTINUED | OUTPATIENT
Start: 2023-10-28 | End: 2023-11-04 | Stop reason: HOSPADM

## 2023-10-28 RX ADMIN — ASPIRIN 81 MG: 81 TABLET, CHEWABLE ORAL at 08:28

## 2023-10-28 RX ADMIN — ACETAMINOPHEN 650 MG: 325 TABLET ORAL at 05:46

## 2023-10-28 RX ADMIN — SODIUM CHLORIDE, PRESERVATIVE FREE 10 ML: 5 INJECTION INTRAVENOUS at 08:58

## 2023-10-28 RX ADMIN — POLYETHYLENE GLYCOL 3350 17 G: 17 POWDER, FOR SOLUTION ORAL at 08:26

## 2023-10-28 RX ADMIN — LABETALOL HYDROCHLORIDE 10 MG: 5 INJECTION, SOLUTION INTRAVENOUS at 06:03

## 2023-10-28 RX ADMIN — SODIUM CHLORIDE, PRESERVATIVE FREE 20 MG: 5 INJECTION INTRAVENOUS at 08:28

## 2023-10-28 RX ADMIN — CHLORHEXIDINE GLUCONATE 15 ML: 1.2 RINSE ORAL at 08:27

## 2023-10-28 RX ADMIN — SODIUM CHLORIDE, PRESERVATIVE FREE 10 ML: 5 INJECTION INTRAVENOUS at 08:31

## 2023-10-28 RX ADMIN — AMLODIPINE BESYLATE 5 MG: 5 TABLET ORAL at 08:28

## 2023-10-28 RX ADMIN — IRON SUCROSE 200 MG: 20 INJECTION, SOLUTION INTRAVENOUS at 08:13

## 2023-10-28 RX ADMIN — OXYCODONE 5 MG: 5 TABLET ORAL at 05:57

## 2023-10-28 RX ADMIN — STANDARDIZED SENNA CONCENTRATE AND DOCUSATE SODIUM 2 TABLET: 8.6; 5 TABLET ORAL at 08:26

## 2023-10-28 RX ADMIN — ENOXAPARIN SODIUM 30 MG: 100 INJECTION SUBCUTANEOUS at 08:58

## 2023-10-28 NOTE — PROGRESS NOTES
This RN noted that patients HR increased to sustaining >120. B/P elevated up to 160/79. ICU residents notified and to bedside. PRN pain meds given along with PRN b/p meds. B/P now 118/71, and HR 89. Care ongoing.

## 2023-10-28 NOTE — PLAN OF CARE
Problem: Discharge Planning  Goal: Discharge to home or other facility with appropriate resources  Outcome: Progressing  Flowsheets (Taken 10/28/2023 0745)  Discharge to home or other facility with appropriate resources:   Identify discharge learning needs (meds, wound care, etc)   Identify barriers to discharge with patient and caregiver     Problem: Pain  Goal: Verbalizes/displays adequate comfort level or baseline comfort level  Outcome: Progressing  Flowsheets (Taken 10/28/2023 0700)  Verbalizes/displays adequate comfort level or baseline comfort level:   Assess pain using appropriate pain scale   Implement non-pharmacological measures as appropriate and evaluate response     Problem: Skin/Tissue Integrity  Goal: Absence of new skin breakdown  Description: 1. Monitor for areas of redness and/or skin breakdown  2. Assess vascular access sites hourly  3. Every 4-6 hours minimum:  Change oxygen saturation probe site  4. Every 4-6 hours:  If on nasal continuous positive airway pressure, respiratory therapy assess nares and determine need for appliance change or resting period.   Outcome: Progressing     Problem: Safety - Adult  Goal: Free from fall injury  Outcome: Progressing     Problem: Nutrition Deficit:  Goal: Optimize nutritional status  Outcome: Progressing     Problem: Neurosensory - Adult  Goal: Achieves stable or improved neurological status  Outcome: Progressing  Goal: Achieves maximal functionality and self care  Outcome: Progressing     Problem: Chronic Conditions and Co-morbidities  Goal: Patient's chronic conditions and co-morbidity symptoms are monitored and maintained or improved  Outcome: Progressing  Flowsheets (Taken 10/28/2023 0745)  Care Plan - Patient's Chronic Conditions and Co-Morbidity Symptoms are Monitored and Maintained or Improved: Monitor and assess patient's chronic conditions and comorbid symptoms for stability, deterioration, or improvement     Problem: Safety - Medical Restraint  Goal: Remains free of injury from restraints (Restraint for Interference with Medical Device)  Description: INTERVENTIONS:  1. Determine that other, less restrictive measures have been tried or would not be effective before applying the restraint  2. Evaluate the patient's condition at the time of restraint application  3. Inform patient/family regarding the reason for restraint  4.  Q2H: Monitor safety, psychosocial status, comfort, nutrition and hydration  Outcome: Progressing  Flowsheets  Taken 10/28/2023 0600 by Christine Soler RN  Remains free of injury from restraints (restraint for interference with medical device):   Determine that other, less restrictive measures have been tried or would not be effective before applying the restraint   Evaluate the patient's condition at the time of restraint application   Inform patient/family regarding the reason for restraint   Every 2 hours: Monitor safety, psychosocial status, comfort, nutrition and hydration  Taken 10/28/2023 0400 by Christine Soler RN  Remains free of injury from restraints (restraint for interference with medical device):   Determine that other, less restrictive measures have been tried or would not be effective before applying the restraint   Evaluate the patient's condition at the time of restraint application   Inform patient/family regarding the reason for restraint   Every 2 hours: Monitor safety, psychosocial status, comfort, nutrition and hydration     Problem: ABCDS Injury Assessment  Goal: Absence of physical injury  Outcome: Progressing

## 2023-10-28 NOTE — PROGRESS NOTES
Physical Therapy  Facility/Department: [facility] ICU  Physical Therapy Initial Assessment/Treatment    Name: Karen Glass  : 1980  MRN: [id number]  Date of Service: 10/28/2023    Discharge Recommendations: Karen Glass scored a /24 on the AM-PAC short mobility form. Current research shows that an AM-PAC score of 17 or less is typically not associated with a discharge to the patient's home setting. Based on the patient's AM-PAC score and their current functional mobility deficits, it is recommended that the patient have 5-7 sessions per week of Physical Therapy at d/c to increase the patient's independence. At this time, this patient demonstrates complex nursing, medical, and rehabilitative needs, and would benefit from intensive rehabilitation services upon discharge from the Inpatient setting. This patient demonstrates the ability to participate in and benefit from an intensive therapy program with a coordinated interdisciplinary team approach to foster frequent, structured, and documented communication among disciplines, who will work together to establish, prioritize, and achieve treatment goals. Please see assessment section for further patient specific details. If patient discharges prior to next session this note will serve as a discharge summary. Please see below for the latest assessment towards goals. PT Equipment Recommendations  Equipment Needed:  (defer)      Patient Diagnosis(es): The primary encounter diagnosis was Acute ischemic left MCA stroke ([address]). Diagnoses of Acute cerebrovascular accident (CVA) ([address]) and Left middle cerebral artery stroke [facility]) were also pertinent to this visit. Past Medical History:  has no past medical history on file. Past Surgical History:  has a past surgical history that includes craniotomy (Left, 10/21/2023). Assessment   Assessment: 42 yo found unresponsive at home after alcohol & drug intake.   Pt with large L CVA resulting in flaccid R

## 2023-10-28 NOTE — PROGRESS NOTES
Occupational Therapy  Facility/Department: [facility] ICU  Occupational Therapy Initial Assessment/Tx Note    Name: Lasha Montes  : 1980  MRN: [id number]  Date of Service: 10/28/2023    Discharge Recommendations:  [address], IP Rehab  OT Equipment Recommendations  Equipment Needed: No     Treatment Diagnosis: Decreased activity tolerance, impaired ADLs and mobility      Assessment   Performance deficits / Impairments: Decreased functional mobility ; Decreased ADL status; Decreased ROM; Decreased strength;Decreased safe awareness;Decreased cognition;Decreased endurance;Decreased balance;Decreased vision/visual deficit; Decreased high-level IADLs;Decreased fine motor control;Decreased coordination  Assessment: Pt presents with numerous neurological deficits which impair her ability to perform functional mobility and ADLs. She is non-verbal, but followed many commands and was cooperative throughout session. She tolerated sitting EOB with varying levels of assist and followed commands for ROM of unaffected extremities. Anticipate good progress with continued therapies. She may tolerate SNF level of care best at this time, progressing to ARU. Will continue to assess. Treatment Diagnosis: Decreased activity tolerance, impaired ADLs and mobility  Decision Making: High Complexity  REQUIRES OT FOLLOW-UP: Yes  Activity Tolerance  Activity Tolerance: Patient Tolerated treatment well        Plan   Occupational Therapy Plan  Times Per Week: 5-7x  Times Per Day:  Once a day  Current Treatment Recommendations: Strengthening, ROM, Balance training, Functional mobility training, Endurance training, Neuromuscular re-education, Cognitive reorientation, Safety education & training, Patient/Caregiver education & training, Equipment evaluation, education, & procurement, Self-Care / ADL, Cognitive/Perceptual training, Coordination training     Restrictions  Position Activity Restriction  Other position/activity

## 2023-10-28 NOTE — PROGRESS NOTES
Hemodynamically stable/ Neuro unchanged/ pt moves strong on left side/ flaccid on right side/ pt is not following commands/ Ida signed off per Dr Luis Brown note 10/27 / will address down grading neuro checks to q4 on am rounds/  see notes/ flow sheet/ orders    1700 pt has step down orders/ pt has been more alert/ follows intermittent/ pt has many incontinence episodes/ multi sheet / gown changes throughout shift/ despite being in bilat wrist restraints/ pt managed to remove NGT/ she constantly blowing her nose/ unable to re- insert d/t shaking her head  side to side/ will let pt calm down re approach/ Dr Shelly Langston aware + cancel UA for now/ pt will not cooperate straight cath + on menstrual cycle

## 2023-10-28 NOTE — PLAN OF CARE
Problem: Discharge Planning  Goal: Discharge to home or other facility with appropriate resources  Outcome: Progressing     Problem: Pain  Goal: Verbalizes/displays adequate comfort level or baseline comfort level  Outcome: Progressing  Flowsheets (Taken 10/27/2023 1600 by Jax Wasserman RN)  Verbalizes/displays adequate comfort level or baseline comfort level:   Encourage patient to monitor pain and request assistance   Administer analgesics based on type and severity of pain and evaluate response   Assess pain using appropriate pain scale   Consider cultural and social influences on pain and pain management   Implement non-pharmacological measures as appropriate and evaluate response   Notify Licensed Independent Practitioner if interventions unsuccessful or patient reports new pain     Problem: Skin/Tissue Integrity  Goal: Absence of new skin breakdown  Description: 1. Monitor for areas of redness and/or skin breakdown  2. Assess vascular access sites hourly  3. Every 4-6 hours minimum:  Change oxygen saturation probe site  4. Every 4-6 hours:  If on nasal continuous positive airway pressure, respiratory therapy assess nares and determine need for appliance change or resting period.   Outcome: Progressing     Problem: Safety - Adult  Goal: Free from fall injury  Outcome: Progressing  Flowsheets (Taken 10/27/2023 2000)  Free From Fall Injury: Instruct family/caregiver on patient safety     Problem: Nutrition Deficit:  Goal: Optimize nutritional status  Outcome: Progressing     Problem: Neurosensory - Adult  Goal: Achieves stable or improved neurological status  Outcome: Progressing  Flowsheets (Taken 10/27/2023 2000)  Achieves stable or improved neurological status: Assess for and report changes in neurological status  Goal: Achieves maximal functionality and self care  Outcome: Progressing  Flowsheets (Taken 10/27/2023 2000)  Achieves maximal functionality and self care: Monitor swallowing and airway patency

## 2023-10-28 NOTE — PROGRESS NOTES
ICU Progress Note    Admit Date: 10/21/2023  Day: 8  Vent Day: s/p sef extubation  IV Access:Peripheral  IV Fluids:None  Vasopressors:None                Antibiotics: s/p Ancef  Diet: Diet NPO  DIET TUBE FEED VOLUME BASED WITH DIET STANDARD WITH FIBER (Jevity 1.5); Nasogastric; Continuous; 1,080; 24    CC: AMS    Interval history: Pt seen and examined at bedside, POD#7  Tachy episodes overnight  Spontaneous L  and movement of L LE/toes  L hand squeeze        HPI: Per 10/21 note  \"HPI: Benjamin Burger is a 42 yo female with no available documented medical history who presented from home to [facility] ED after family found her unconscious. Per ED note, her family states they believe she took multiple illicit drugs as well as large amounts of alcohol and had been sleeping for over 20 hours. EMS was called, gave Narcan with minimal response. On arrival to the ED she was nonverbal, was able to intermittently follow commands. GCS of 9 on arrival.      CT Head showed large left MCA infarct, 7 mm left-to-right midline shift  CTA Head Neck showed left cervical ICA, left intracranial ICA and left M1 MCA occlusions. ED paged the stroke and neurosurgery teams, who decided on immediate transfer to [facility] for further neurocritical care. Prior to transport the patient was intubated using etomidate and rocuronium for airway protection 2/2 GCS decline with decorticate posturing. Patient underwent hemicraniectomy on 10/22 after declining neuro exam and repeat head CT showing worsening edema, midline shift, and possible uncal herniation. \"    Medications:     Scheduled Meds:   famotidine  20 mg Oral BID    iron sucrose  200 mg IntraVENous Daily    polyethylene glycol  17 g Orogastric BID    enoxaparin  30 mg SubCUTAneous BID    aspirin  81 mg Orogastric Daily    sodium chloride flush  5-40 mL IntraVENous 2 times per day    atorvastatin  80 mg Orogastric Nightly    sennosides-docusate sodium  2 tablet Orogastric BID

## 2023-10-28 NOTE — PROGRESS NOTES
Omar Hadley MD    Hospitalist Progress Note      Name:  Antwon Dickey /Age/Sex: 1980  (41 y.o. female)   MRN & CSN:  [id number] & [id number] Admission Date/Time: 10/21/2023  5:17 AM   Location:  4559/9117-89 PCP: No primary care provider on file. I saw and examined the patient on 10/28/2023 at 9:23 AM.    Hospital Day: 8  Barriers to discharge:   Assessment and Plan:     Antwon Dickey is a 41 y.o. female who presented from home to [facility] ED after family found her unconscious. Per ED note, her family states they believe she took multiple illicit drugs as well as large amounts of alcohol and had been sleeping for over 20 hours. EMS was called, gave Narcan with minimal response. On arrival to the ED she was nonverbal, was able to intermittently follow commands. GCS of 9 on arrival. At that time CT Head showed large left MCA infarct, 7 mm left-to-right midline shift CTA Head Neck showed left cervical ICA, left intracranial ICA and left M1 MCA occlusions. Prior to transport the patient was intubated. After arriving here patient underwent hemicraniectomy on 10/22 after declining neuro exam and repeat head CT showing worsening edema, midline shift, and possible uncal herniation    Large vessel occlusion CVA status post hemicraniectomy for cerebral edema with midline shift:  Followed by neurosurgery and critical care team.  Per RN unchanged mentation     Acute respiratory failure:  Self extubated  Saturating 98% on 2 L     Microcytic anemia:  Trend hemoglobin 7.6 today  Iron studies show low iron  We will start IV Venofer  No overt signs of bleeding  Continue to trend     Hypernatremia; Na 151-slowly trending down  continue to monitor  Current limited free water per neurosurgery wishes  To try and prevent worsening cerebral edema  Sodium goal range between 125-155.   Free water flushes through feeding tube     Hypercoagulability: Work-up in progress     Polysubstance abuse disorder:  Positive

## 2023-10-29 ENCOUNTER — APPOINTMENT (OUTPATIENT)
Dept: GENERAL RADIOLOGY | Age: 43
End: 2023-10-29
Attending: INTERNAL MEDICINE
Payer: COMMERCIAL

## 2023-10-29 LAB
ANION GAP SERPL CALCULATED.3IONS-SCNC: 14 MMOL/L (ref 3–16)
ANION GAP SERPL CALCULATED.3IONS-SCNC: 16 MMOL/L (ref 3–16)
BUN SERPL-MCNC: 31 MG/DL (ref 7–20)
BUN SERPL-MCNC: 37 MG/DL (ref 7–20)
CALCIUM SERPL-MCNC: 9.2 MG/DL (ref 8.3–10.6)
CALCIUM SERPL-MCNC: 9.2 MG/DL (ref 8.3–10.6)
CHLORIDE SERPL-SCNC: 108 MMOL/L (ref 99–110)
CHLORIDE SERPL-SCNC: 111 MMOL/L (ref 99–110)
CO2 SERPL-SCNC: 24 MMOL/L (ref 21–32)
CO2 SERPL-SCNC: 26 MMOL/L (ref 21–32)
CREAT SERPL-MCNC: 0.9 MG/DL (ref 0.6–1.1)
CREAT SERPL-MCNC: 1 MG/DL (ref 0.6–1.1)
DEPRECATED RDW RBC AUTO: 18 % (ref 12.4–15.4)
GFR SERPLBLD CREATININE-BSD FMLA CKD-EPI: >60 ML/MIN/{1.73_M2}
GFR SERPLBLD CREATININE-BSD FMLA CKD-EPI: >60 ML/MIN/{1.73_M2}
GLUCOSE BLD-MCNC: 155 MG/DL (ref 70–99)
GLUCOSE SERPL-MCNC: 129 MG/DL (ref 70–99)
GLUCOSE SERPL-MCNC: 155 MG/DL (ref 70–99)
HCT VFR BLD AUTO: 24.6 % (ref 36–48)
HGB BLD-MCNC: 7.4 G/DL (ref 12–16)
MCH RBC QN AUTO: 20.5 PG (ref 26–34)
MCHC RBC AUTO-ENTMCNC: 30 G/DL (ref 31–36)
MCV RBC AUTO: 68.4 FL (ref 80–100)
PERFORMED ON: ABNORMAL
PLATELET # BLD AUTO: 472 K/UL (ref 135–450)
PMV BLD AUTO: 8.8 FL (ref 5–10.5)
POTASSIUM SERPL-SCNC: 3.6 MMOL/L (ref 3.5–5.1)
POTASSIUM SERPL-SCNC: 3.8 MMOL/L (ref 3.5–5.1)
RBC # BLD AUTO: 3.6 M/UL (ref 4–5.2)
SODIUM SERPL-SCNC: 148 MMOL/L (ref 136–145)
SODIUM SERPL-SCNC: 151 MMOL/L (ref 136–145)
WBC # BLD AUTO: 25.3 K/UL (ref 4–11)

## 2023-10-29 PROCEDURE — 85027 COMPLETE CBC AUTOMATED: CPT

## 2023-10-29 PROCEDURE — 94640 AIRWAY INHALATION TREATMENT: CPT

## 2023-10-29 PROCEDURE — 80048 BASIC METABOLIC PNL TOTAL CA: CPT

## 2023-10-29 PROCEDURE — 36415 COLL VENOUS BLD VENIPUNCTURE: CPT

## 2023-10-29 PROCEDURE — 6370000000 HC RX 637 (ALT 250 FOR IP)

## 2023-10-29 PROCEDURE — 6360000002 HC RX W HCPCS

## 2023-10-29 PROCEDURE — 74018 RADEX ABDOMEN 1 VIEW: CPT

## 2023-10-29 PROCEDURE — 6360000002 HC RX W HCPCS: Performed by: INTERNAL MEDICINE

## 2023-10-29 PROCEDURE — 2580000003 HC RX 258

## 2023-10-29 PROCEDURE — 2500000003 HC RX 250 WO HCPCS: Performed by: INTERNAL MEDICINE

## 2023-10-29 PROCEDURE — 92610 EVALUATE SWALLOWING FUNCTION: CPT

## 2023-10-29 PROCEDURE — 2580000003 HC RX 258: Performed by: INTERNAL MEDICINE

## 2023-10-29 PROCEDURE — 71045 X-RAY EXAM CHEST 1 VIEW: CPT

## 2023-10-29 PROCEDURE — 94761 N-INVAS EAR/PLS OXIMETRY MLT: CPT

## 2023-10-29 PROCEDURE — 2060000000 HC ICU INTERMEDIATE R&B

## 2023-10-29 PROCEDURE — 2700000000 HC OXYGEN THERAPY PER DAY

## 2023-10-29 RX ORDER — ALBUTEROL SULFATE 2.5 MG/3ML
2.5 SOLUTION RESPIRATORY (INHALATION)
Status: DISCONTINUED | OUTPATIENT
Start: 2023-10-30 | End: 2023-10-31

## 2023-10-29 RX ORDER — GUAIFENESIN 600 MG/1
600 TABLET, EXTENDED RELEASE ORAL 2 TIMES DAILY
Status: DISCONTINUED | OUTPATIENT
Start: 2023-10-29 | End: 2023-10-29

## 2023-10-29 RX ORDER — ALBUTEROL SULFATE 2.5 MG/3ML
2.5 SOLUTION RESPIRATORY (INHALATION)
Status: DISCONTINUED | OUTPATIENT
Start: 2023-10-29 | End: 2023-10-29

## 2023-10-29 RX ORDER — GUAIFENESIN 200 MG/10ML
200 LIQUID ORAL EVERY 6 HOURS PRN
Status: DISCONTINUED | OUTPATIENT
Start: 2023-10-29 | End: 2023-11-04 | Stop reason: HOSPADM

## 2023-10-29 RX ORDER — SODIUM CHLORIDE FOR INHALATION 3 %
4 VIAL, NEBULIZER (ML) INHALATION
Status: DISCONTINUED | OUTPATIENT
Start: 2023-10-29 | End: 2023-10-29

## 2023-10-29 RX ORDER — SODIUM CHLORIDE FOR INHALATION 3 %
4 VIAL, NEBULIZER (ML) INHALATION
Status: DISCONTINUED | OUTPATIENT
Start: 2023-10-30 | End: 2023-10-31

## 2023-10-29 RX ORDER — ALBUTEROL SULFATE 2.5 MG/3ML
2.5 SOLUTION RESPIRATORY (INHALATION) EVERY 6 HOURS PRN
Status: DISCONTINUED | OUTPATIENT
Start: 2023-10-29 | End: 2023-10-29

## 2023-10-29 RX ADMIN — ASPIRIN 81 MG: 81 TABLET, CHEWABLE ORAL at 08:45

## 2023-10-29 RX ADMIN — SODIUM CHLORIDE 30 MG/ML INHALATION SOLUTION 4 ML: 30 SOLUTION INHALANT at 20:49

## 2023-10-29 RX ADMIN — ALTEPLASE 1 MG: 2.2 INJECTION, POWDER, LYOPHILIZED, FOR SOLUTION INTRAVENOUS at 14:35

## 2023-10-29 RX ADMIN — CHLORHEXIDINE GLUCONATE 15 ML: 1.2 RINSE ORAL at 08:45

## 2023-10-29 RX ADMIN — ENOXAPARIN SODIUM 30 MG: 100 INJECTION SUBCUTANEOUS at 08:46

## 2023-10-29 RX ADMIN — PIPERACILLIN AND TAZOBACTAM 4500 MG: 4; .5 INJECTION, POWDER, LYOPHILIZED, FOR SOLUTION INTRAVENOUS at 18:52

## 2023-10-29 RX ADMIN — ALBUTEROL SULFATE 2.5 MG: 2.5 SOLUTION RESPIRATORY (INHALATION) at 20:49

## 2023-10-29 RX ADMIN — PROCHLORPERAZINE EDISYLATE 10 MG: 5 INJECTION INTRAMUSCULAR; INTRAVENOUS at 13:34

## 2023-10-29 RX ADMIN — POLYETHYLENE GLYCOL 3350 17 G: 17 POWDER, FOR SOLUTION ORAL at 20:27

## 2023-10-29 RX ADMIN — FAMOTIDINE 20 MG: 20 TABLET, FILM COATED ORAL at 08:45

## 2023-10-29 RX ADMIN — STANDARDIZED SENNA CONCENTRATE AND DOCUSATE SODIUM 2 TABLET: 8.6; 5 TABLET ORAL at 09:41

## 2023-10-29 RX ADMIN — SODIUM CHLORIDE, PRESERVATIVE FREE 10 ML: 5 INJECTION INTRAVENOUS at 20:28

## 2023-10-29 RX ADMIN — POLYETHYLENE GLYCOL 3350 17 G: 17 POWDER, FOR SOLUTION ORAL at 09:41

## 2023-10-29 RX ADMIN — ATORVASTATIN CALCIUM 80 MG: 80 TABLET, FILM COATED ORAL at 20:27

## 2023-10-29 RX ADMIN — AMLODIPINE BESYLATE 5 MG: 5 TABLET ORAL at 08:45

## 2023-10-29 RX ADMIN — SODIUM CHLORIDE, PRESERVATIVE FREE 10 ML: 5 INJECTION INTRAVENOUS at 08:45

## 2023-10-29 RX ADMIN — SODIUM CHLORIDE, PRESERVATIVE FREE 10 ML: 5 INJECTION INTRAVENOUS at 00:27

## 2023-10-29 RX ADMIN — CHLORHEXIDINE GLUCONATE 15 ML: 1.2 RINSE ORAL at 20:28

## 2023-10-29 RX ADMIN — ENOXAPARIN SODIUM 30 MG: 100 INJECTION SUBCUTANEOUS at 20:27

## 2023-10-29 RX ADMIN — SODIUM CHLORIDE, PRESERVATIVE FREE 10 ML: 5 INJECTION INTRAVENOUS at 08:46

## 2023-10-29 RX ADMIN — ALBUTEROL SULFATE 2.5 MG: 2.5 SOLUTION RESPIRATORY (INHALATION) at 13:55

## 2023-10-29 RX ADMIN — SODIUM CHLORIDE, PRESERVATIVE FREE 10 ML: 5 INJECTION INTRAVENOUS at 20:29

## 2023-10-29 RX ADMIN — FAMOTIDINE 20 MG: 20 TABLET, FILM COATED ORAL at 20:27

## 2023-10-29 RX ADMIN — GUAIFENESIN 200 MG: 100 SOLUTION ORAL at 15:46

## 2023-10-29 RX ADMIN — CEFTRIAXONE 1000 MG: 1 INJECTION, POWDER, FOR SOLUTION INTRAMUSCULAR; INTRAVENOUS at 14:28

## 2023-10-29 RX ADMIN — ENOXAPARIN SODIUM 30 MG: 100 INJECTION SUBCUTANEOUS at 00:26

## 2023-10-29 RX ADMIN — IRON SUCROSE 200 MG: 20 INJECTION, SOLUTION INTRAVENOUS at 08:53

## 2023-10-29 NOTE — PLAN OF CARE
Problem: SLP Adult - Impaired Swallowing  Goal: By Discharge: Advance to least restrictive diet without signs or symptoms of aspiration for planned discharge setting. See evaluation for individualized goals. Outcome: Progressing     Janae BULLOCK [phone]    Pg.  # [phone]

## 2023-10-29 NOTE — PROGRESS NOTES
Corepak removed by patient. MD notified. Multiple unsuccessful attempts made to place new corepak by multiple RNs.

## 2023-10-29 NOTE — PROGRESS NOTES
Upon giving pt PRN mucinex via corpak, pt noted to cough when flushing. No changes noted to placement, most recent scan reviewed. Upon administering medication, immediately noted to be coming out of nose. Pt O2 remained stable, suction provided. Dr. Jorge Natarajan notified via perfectserve and ordered a new KUB.

## 2023-10-29 NOTE — PROGRESS NOTES
Was called into the room to assess the pt after speech had given 1-2 ice chips and pt started having a cough attack. Upon entering, speech was suctioning patient and pt was coughing a ton. Checked spo2 and sats were in the high 80's so we placed pt on 3lnc. Coughing did not subside , pt was lethargic, and oxygen demands increased- therefore I increase oxygen to 6lnc to keeps sats above 92%. A rr was called at 1340 and a breathing treatment was ordered and given to pt. Pt o2 sats were 97% on a 2lnc after treatments were given.  Will continue plan of care

## 2023-10-29 NOTE — PLAN OF CARE
Problem: Skin/Tissue Integrity  Goal: Absence of new skin breakdown  10/29/2023 0741 by iMma Man, RN  Outcome: Progressing  Q2 turns and specialty mattress utilized to promote and maintain skin integrity. Problem: Safety - Adult  Goal: Free from fall injury  10/29/2023 0741 by Mima Man, RN  Outcome: Progressing  All fall precautions in place. Bed locked and in lowest position with alarm on. Overbed table and personal belonings within reach. Call light within reach and patient instructed to use call light for assistance. Non-skid socks on.

## 2023-10-29 NOTE — PROGRESS NOTES
This RN called into room by speech therapist asking for respiratory therapist.  This RN called respiratory into room. Upon entering room speech therapist informed this RN that she had given pt 1-2 ice chips and pt then began coughing without relief. Pt suctioned and O2 saturation monitored. O2 read mid 80's on RA so 3L of O2 started via nasal cannula. Pt given PRN compazine for suspected nausea. Pt's coughing subsided and pt became very lethargic. O2 dropped to high 80's so increased oxygen to 6L to maintain O2 above 90%. RR called at 1340. Vitals: /89, ,  O2 95% on 6L NC, Temp 98.3, respirations 21, . Doctors arrived at bedside. Tube feed stopped at 1345 per doctors request which they still want to be held at this time. Stat chest xray ordered and done at bedside. Respiratory remained at bedside which they continued suctioning and provided breathing treatment. Decongestant ordered by providers as well as scheduled respiratory treatments. Family at bedside during this time, continually updated. Pt now appears back to baseline, arousable, and following this RN and family members with eyes. Respiratory treatment complete, O2 98% on 2L NC. All fall and safety precautions in place, bed locked and in lowest position, call light and belongings within reach.

## 2023-10-29 NOTE — PROGRESS NOTES
Patient admitted to room 5528 from ICU. Patient is alert. Patient is nonverbal and unable to assess orientation. Vital signs are stable. Antonio Kawasaki is in place. Bed is in the lowest position. Bed alarm is activated. Call light is within reach. Will continue to monitor and reassess.

## 2023-10-29 NOTE — PROGRESS NOTES
Speech Language Pathology  Facility/Department:[facility] 5T ORTHO/NEURO  Dysphagia Evaluation  Name: Ana Rasheed  : 1980  MRN: [id number]                                                         Patient Diagnosis(es):   Patient Active Problem List    Diagnosis Date Noted    Acute ischemic left MCA stroke ([address]) 10/22/2023    Cerebrovascular accident (CVA) due to embolism of left middle cerebral artery ([address]) 10/21/2023    Cerebral edema ([address]) 10/21/2023    Brain compression ([address]) 10/21/2023    Acute respiratory failure with hypoxia ([address]) 10/21/2023       History reviewed. No pertinent past medical history. Past Surgical History:   Procedure Laterality Date    CRANIOTOMY Left 10/21/2023    LEFT DECOMPRESSIVE FLORA-CRANIECTOMY performed by Rupa Haq MD at [address] [place] [address] for Referral:  Ana Rasheed  was referred for a Speech Therapy evaluation to assess swallow function and/or communication. History of Present Illness  Per MD notes:    41 y.o. female who presented from home to [facility] ED after family found her unconscious. Per ED note, her family states they believe she took multiple illicit drugs as well as large amounts of alcohol and had been sleeping for over 20 hours. EMS was called, gave Narcan with minimal response. On arrival to the ED she was nonverbal, was able to intermittently follow commands. GCS of 9 on arrival. presented outside window for TNK / EVT  S/p L Hemicraniectomy on 10/21/23. At that time CT Head showed large left MCA infarct, 7 mm left-to-right midline shift CTA Head Neck showed left cervical ICA, left intracranial ICA and left M1 MCA occlusions. Prior to transport the patient was intubated.   After arriving here patient underwent hemicraniectomy on 10/22 after declining neuro exam and repeat head CT showing worsening edema, midline shift, and possible uncal herniation     Large vessel occlusion CVA status post hemicraniectomy for cerebral

## 2023-10-29 NOTE — PROGRESS NOTES
AM    Minimum 35 Minutes spent in preparation of following this patient including face to face encounter, discussions with the patient and/or family, medication reconciliation, and transition of care preparation.             Medications:   Medications:    famotidine  20 mg Oral BID    iron sucrose  200 mg IntraVENous Daily    polyethylene glycol  17 g Orogastric BID    enoxaparin  30 mg SubCUTAneous BID    aspirin  81 mg Orogastric Daily    sodium chloride flush  5-40 mL IntraVENous 2 times per day    atorvastatin  80 mg Orogastric Nightly    sennosides-docusate sodium  2 tablet Orogastric BID    amLODIPine  5 mg Orogastric Daily    chlorhexidine  15 mL Mouth/Throat BID    sodium chloride flush  5-40 mL IntraVENous 2 times per day      Infusions:    sodium chloride      sodium chloride      niCARdipine Stopped (10/23/23 0146)     PRN Meds: oxyCODONE, 5 mg, Q4H PRN   Or  oxyCODONE, 10 mg, Q4H PRN  sodium chloride flush, 5-40 mL, PRN  sodium chloride, 25 mL, PRN  bisacodyl, 10 mg, Daily PRN  acetaminophen, 650 mg, Q6H PRN  senna, 1 tablet, Nightly PRN  hydrALAZINE, 10 mg, Q6H PRN  sodium chloride flush, 5-40 mL, PRN  sodium chloride, , PRN  labetalol, 10 mg, Q10 Min PRN  prochlorperazine, 10 mg, Q6H PRN

## 2023-10-29 NOTE — PROGRESS NOTES
Pt alert but unable to assess pt orientation level. VSS on 2L NC at this time. Acute events noted this shift, see previous notes. Neuro checks remain unchanged. NIH 25. Tube feed currently on hold per orders. Pt voiding adequately via purewick. Pt is currently resting in bed with all fall and safety precautions in place, bed locked and in lowest position, call light and belongings within reach. Family at bedside.

## 2023-10-29 NOTE — PLAN OF CARE
Problem: Pain  Goal: Verbalizes/displays adequate comfort level or baseline comfort level  10/29/2023 0038 by Lilliana Arevalo RN  Outcome: Progressing    Problem: Skin/Tissue Integrity  Goal: Absence of new skin breakdown  Description: 1. Monitor for areas of redness and/or skin breakdown  2. Assess vascular access sites hourly  3. Every 4-6 hours minimum:  Change oxygen saturation probe site  4. Every 4-6 hours:  If on nasal continuous positive airway pressure, respiratory therapy assess nares and determine need for appliance change or resting period. 10/29/2023 0038 by Lilliana Arevalo RN  Outcome: Progressing    Problem: Safety - Adult  Goal: Free from fall injury  10/29/2023 0038 by Lilliana Arevalo RN  Outcome: Progressing    Problem: Nutrition Deficit:  Goal: Optimize nutritional status  10/29/2023 0038 by Lilliana Arevalo RN  Outcome: Progressing    Problem: Safety - Medical Restraint  Goal: Remains free of injury from restraints (Restraint for Interference with Medical Device)  Description: INTERVENTIONS:  1. Determine that other, less restrictive measures have been tried or would not be effective before applying the restraint  2. Evaluate the patient's condition at the time of restraint application  3. Inform patient/family regarding the reason for restraint  4.  Q2H: Monitor safety, psychosocial status, comfort, nutrition and hydration  10/29/2023 0038 by Lilliana Arevalo RN  Outcome: Progressing  Flowsheets (Taken 10/28/2023 2000 by Nain Saucedo RN)  Remains free of injury from restraints (restraint for interference with medical device): Determine that other, less restrictive measures have been tried or would not be effective before applying the restraint  10/28/2023 1743 by James Thomas RN  Outcome: Progressing    Problem: ABCDS Injury Assessment  Goal: Absence of physical injury  10/29/2023 0038 by Lilliana Arevalo RN  Outcome: Progressing    Problem: Confusion  Goal: Confusion, delirium, dementia, or psychosis is improved or at baseline  Description: INTERVENTIONS:  1. Assess for possible contributors to thought disturbance, including medications, impaired vision or hearing, underlying metabolic abnormalities, dehydration, psychiatric diagnoses, and notify attending LIP  2. [place] high risk fall precautions, as indicated  3. Provide frequent short contacts to provide reality reorientation, refocusing and direction  4. Decrease environmental stimuli, including noise as appropriate  5. Monitor and intervene to maintain adequate nutrition, hydration, elimination, sleep and activity  6. If unable to ensure safety without constant attention obtain sitter and review sitter guidelines with assigned personnel  7.  Initiate Psychosocial CNS and Spiritual Care consult, as indicated  Outcome: Progressing

## 2023-10-29 NOTE — PROGRESS NOTES
Corpak placed. Stat KUB complete. Notified Dr Martinez Barefoot- he advised to advance 5 more centimeters and then OK to use- no need for repeat imaging per MD.    Corpak advanced 5 cms, final measurement 65 cms.

## 2023-10-29 NOTE — PROGRESS NOTES
4 Eyes Admission Assessment     I agree as the admission nurse that 2 RN's have performed a thorough Head to Toe Skin Assessment on the patient. ALL assessment sites listed below have been assessed on admission. Areas assessed by both nurses:   [x]   Head, Face, and Ears   [x]   Shoulders, Back, and Chest  [x]   Arms, Elbows, and Hands   [x]   Coccyx, Sacrum, and Ischium  [x]   Legs, Feet, and Heels        Does the Patient have Skin Breakdown?   No         Paras Prevention initiated:  Yes   Wound Care Orders initiated:  No      [facility] nurse consulted for Pressure Injury (Stage 3,4, Unstageable, DTI, NWPT, and Complex wounds) or Paras score 18 or lower:  No      Nurse 1 eSignature: Electronically signed by Caleb Mcfadden RN on 10/29/23 at 12:31 AM EDT    **SHARE this note so that the co-signing nurse is able to place an eSignature**    Nurse 2 eSignature: {Esignature:[id number]}

## 2023-10-30 ENCOUNTER — APPOINTMENT (OUTPATIENT)
Dept: GENERAL RADIOLOGY | Age: 43
End: 2023-10-30
Attending: INTERNAL MEDICINE
Payer: COMMERCIAL

## 2023-10-30 LAB
ANION GAP SERPL CALCULATED.3IONS-SCNC: 13 MMOL/L (ref 3–16)
BACTERIA SPEC AEROBE CULT: NORMAL
BACTERIA SPEC ANAEROBE CULT: NORMAL
BUN SERPL-MCNC: 37 MG/DL (ref 7–20)
CALCIUM SERPL-MCNC: 9.2 MG/DL (ref 8.3–10.6)
CHLORIDE SERPL-SCNC: 113 MMOL/L (ref 99–110)
CO2 SERPL-SCNC: 27 MMOL/L (ref 21–32)
CREAT SERPL-MCNC: 0.9 MG/DL (ref 0.6–1.1)
DEPRECATED RDW RBC AUTO: 18.4 % (ref 12.4–15.4)
FUNGUS SPEC CULT: NORMAL
GFR SERPLBLD CREATININE-BSD FMLA CKD-EPI: >60 ML/MIN/{1.73_M2}
GLUCOSE SERPL-MCNC: 129 MG/DL (ref 70–99)
HCT VFR BLD AUTO: 25 % (ref 36–48)
HGB BLD-MCNC: 7.3 G/DL (ref 12–16)
LOEFFLER MB STN SPEC: NORMAL
MCH RBC QN AUTO: 20.2 PG (ref 26–34)
MCHC RBC AUTO-ENTMCNC: 29.4 G/DL (ref 31–36)
MCV RBC AUTO: 68.6 FL (ref 80–100)
PLATELET # BLD AUTO: 538 K/UL (ref 135–450)
PMV BLD AUTO: 9.4 FL (ref 5–10.5)
POTASSIUM SERPL-SCNC: 3.8 MMOL/L (ref 3.5–5.1)
RBC # BLD AUTO: 3.64 M/UL (ref 4–5.2)
SODIUM SERPL-SCNC: 150 MMOL/L (ref 136–145)
SODIUM SERPL-SCNC: 153 MMOL/L (ref 136–145)
WBC # BLD AUTO: 30.6 K/UL (ref 4–11)

## 2023-10-30 PROCEDURE — 6360000002 HC RX W HCPCS: Performed by: INTERNAL MEDICINE

## 2023-10-30 PROCEDURE — 74018 RADEX ABDOMEN 1 VIEW: CPT

## 2023-10-30 PROCEDURE — 2060000000 HC ICU INTERMEDIATE R&B

## 2023-10-30 PROCEDURE — 80048 BASIC METABOLIC PNL TOTAL CA: CPT

## 2023-10-30 PROCEDURE — 97530 THERAPEUTIC ACTIVITIES: CPT

## 2023-10-30 PROCEDURE — 84295 ASSAY OF SERUM SODIUM: CPT

## 2023-10-30 PROCEDURE — 94640 AIRWAY INHALATION TREATMENT: CPT

## 2023-10-30 PROCEDURE — 94761 N-INVAS EAR/PLS OXIMETRY MLT: CPT

## 2023-10-30 PROCEDURE — 6370000000 HC RX 637 (ALT 250 FOR IP)

## 2023-10-30 PROCEDURE — 36415 COLL VENOUS BLD VENIPUNCTURE: CPT

## 2023-10-30 PROCEDURE — 2580000003 HC RX 258: Performed by: INTERNAL MEDICINE

## 2023-10-30 PROCEDURE — 6360000002 HC RX W HCPCS

## 2023-10-30 PROCEDURE — 92526 ORAL FUNCTION THERAPY: CPT

## 2023-10-30 PROCEDURE — 2700000000 HC OXYGEN THERAPY PER DAY

## 2023-10-30 PROCEDURE — 97112 NEUROMUSCULAR REEDUCATION: CPT

## 2023-10-30 PROCEDURE — 2500000003 HC RX 250 WO HCPCS: Performed by: INTERNAL MEDICINE

## 2023-10-30 PROCEDURE — 2580000003 HC RX 258

## 2023-10-30 PROCEDURE — 85027 COMPLETE CBC AUTOMATED: CPT

## 2023-10-30 PROCEDURE — 92523 SPEECH SOUND LANG COMPREHEN: CPT

## 2023-10-30 RX ADMIN — CHLORHEXIDINE GLUCONATE 15 ML: 1.2 RINSE ORAL at 22:35

## 2023-10-30 RX ADMIN — SODIUM CHLORIDE 30 MG/ML INHALATION SOLUTION 4 ML: 30 SOLUTION INHALANT at 13:05

## 2023-10-30 RX ADMIN — ALBUTEROL SULFATE 2.5 MG: 2.5 SOLUTION RESPIRATORY (INHALATION) at 15:35

## 2023-10-30 RX ADMIN — STANDARDIZED SENNA CONCENTRATE AND DOCUSATE SODIUM 2 TABLET: 8.6; 5 TABLET ORAL at 09:47

## 2023-10-30 RX ADMIN — FAMOTIDINE 20 MG: 20 TABLET, FILM COATED ORAL at 08:13

## 2023-10-30 RX ADMIN — SODIUM CHLORIDE 30 MG/ML INHALATION SOLUTION 4 ML: 30 SOLUTION INHALANT at 07:40

## 2023-10-30 RX ADMIN — SODIUM CHLORIDE 30 MG/ML INHALATION SOLUTION 4 ML: 30 SOLUTION INHALANT at 15:35

## 2023-10-30 RX ADMIN — FAMOTIDINE 20 MG: 20 TABLET, FILM COATED ORAL at 22:35

## 2023-10-30 RX ADMIN — ALBUTEROL SULFATE 2.5 MG: 2.5 SOLUTION RESPIRATORY (INHALATION) at 13:05

## 2023-10-30 RX ADMIN — SODIUM CHLORIDE, PRESERVATIVE FREE 10 ML: 5 INJECTION INTRAVENOUS at 08:14

## 2023-10-30 RX ADMIN — ALBUTEROL SULFATE 2.5 MG: 2.5 SOLUTION RESPIRATORY (INHALATION) at 07:40

## 2023-10-30 RX ADMIN — ENOXAPARIN SODIUM 30 MG: 100 INJECTION SUBCUTANEOUS at 08:13

## 2023-10-30 RX ADMIN — AMLODIPINE BESYLATE 5 MG: 5 TABLET ORAL at 08:13

## 2023-10-30 RX ADMIN — ATORVASTATIN CALCIUM 80 MG: 80 TABLET, FILM COATED ORAL at 22:35

## 2023-10-30 RX ADMIN — SODIUM CHLORIDE 30 MG/ML INHALATION SOLUTION 4 ML: 30 SOLUTION INHALANT at 21:51

## 2023-10-30 RX ADMIN — PIPERACILLIN AND TAZOBACTAM 3375 MG: 3; .375 INJECTION, POWDER, LYOPHILIZED, FOR SOLUTION INTRAVENOUS at 09:52

## 2023-10-30 RX ADMIN — GUAIFENESIN 200 MG: 100 SOLUTION ORAL at 15:58

## 2023-10-30 RX ADMIN — ALBUTEROL SULFATE 2.5 MG: 2.5 SOLUTION RESPIRATORY (INHALATION) at 21:51

## 2023-10-30 RX ADMIN — POLYETHYLENE GLYCOL 3350 17 G: 17 POWDER, FOR SOLUTION ORAL at 08:13

## 2023-10-30 RX ADMIN — PIPERACILLIN AND TAZOBACTAM 3375 MG: 3; .375 INJECTION, POWDER, LYOPHILIZED, FOR SOLUTION INTRAVENOUS at 02:59

## 2023-10-30 RX ADMIN — PIPERACILLIN AND TAZOBACTAM 3375 MG: 3; .375 INJECTION, POWDER, LYOPHILIZED, FOR SOLUTION INTRAVENOUS at 18:43

## 2023-10-30 RX ADMIN — ENOXAPARIN SODIUM 30 MG: 100 INJECTION SUBCUTANEOUS at 22:35

## 2023-10-30 RX ADMIN — POLYETHYLENE GLYCOL 3350 17 G: 17 POWDER, FOR SOLUTION ORAL at 22:35

## 2023-10-30 RX ADMIN — ASPIRIN 81 MG: 81 TABLET, CHEWABLE ORAL at 08:13

## 2023-10-30 RX ADMIN — CHLORHEXIDINE GLUCONATE 15 ML: 1.2 RINSE ORAL at 08:13

## 2023-10-30 NOTE — PROGRESS NOTES
Sivan Worrell MD    Hospitalist Progress Note      Name:  Ana Rasheed /Age/Sex: 1980  (41 y.o. female)   MRN & CSN:  [id number] & [id number] Admission Date/Time: 10/21/2023  5:17 AM   Location:  7258/1597-01 PCP: No primary care provider on file. I saw and examined the patient on 10/30/2023 at 9:33 AM.    Hospital Day: 10  Barriers to discharge: Goals of care discussion, palliative care consult, may need PEG tube placement  Assessment and Plan:     41 y.o. female who presented from home to [facility] ED after family found her unconscious. Per ED note, her family states they believe she took multiple illicit drugs as well as large amounts of alcohol and had been sleeping for over 20 hours. EMS was called, gave Narcan with minimal response. On arrival to the ED she was nonverbal, was able to intermittently follow commands. GCS of 9 on arrival. presented outside window for TNK / EVT  S/p L Hemicraniectomy on 10/21/23. At that time CT Head showed large left MCA infarct, 7 mm left-to-right midline shift CTA Head Neck showed left cervical ICA, left intracranial ICA and left M1 MCA occlusions. Prior to transport the patient was intubated. After arriving here patient underwent hemicraniectomy on 10/22 after declining neuro exam and repeat head CT showing worsening edema, midline shift, and possible uncal herniation     Large vessel occlusion CVA status post hemicraniectomy for cerebral edema with midline shift:  Followed by neurosurgery and critical care team.  NS saw pt on 10/27: Continue incisional care, remove Staples out on POD#14    Per neurology on aspirin and statin. Outpatient follow-up with stroke clinic  Patient with dense right-sided hemiplegia. Occasionally responds to verbal stimuli but does not follow commands     Dysphagia. Patient still has NG tube will order speech evaluation. Likely will need palliative care evaluation as well to discuss goals of care. Recurrent aspirations.   We

## 2023-10-30 NOTE — CARE COORDINATION
CM following: CM met with pt and sister, [place], at bedside and later the pt's mother outside of room. Pt's family would like to pursue rehab at [facility] at discharge. CM provided an additional list of SNF options and family will review in case [facility] is unable to accept the referral. Pt's mother also inquired about potential hospital bill and CM will reach out to billing department to f/u with pt's mother tomorrow. Pt's mother plans to be at the hospital all day tomorrow. CM to obtain SSN from pt's mother tomorrow and complete referral to [facility] at that time. CM will continue to follow.   Electronically signed by ZAYDA Corrales on 10/30/2023 at 5:40 PM  [phone]

## 2023-10-30 NOTE — PROGRESS NOTES
Central line dressing changed using sterile technique following hospital policy. Todd Naylor, observed with procedure to ensure proper technique.

## 2023-10-30 NOTE — PLAN OF CARE
Neurology Plan of Care:    Rapid response called on patient for new O2 demand and lethargy. Prior to rapid response being called patient was working with speech, had eaten an ice chip then began coughing. Copious secretions were suctioned out, SpO2 decreased to 80s on RA    SpO2 improved with oxygen in place. Patient's family report she has been doing well overall, has been interacting with them some, pushing her mom away, etc.     She continues to move L side spontaneously, intermittently follows a few commands    ICU team concerned for aspiration, TF held and chest xray obtained    Patient became more alert throughout event, followed up about 45 minutes later and she was looking at family in room, moving L side spontaneously. Continue Q4 hour neuro checks.  Please call with any neurologic exam changes    ZACARIAS Simon-CNP  Neurology & Neurocritical Care   Neurology Line: [phone]  [employer]: [facility] Neurology & Neuro Critical Care [facility]

## 2023-10-30 NOTE — PLAN OF CARE
Problem: Pain  Goal: Verbalizes/displays adequate comfort level or baseline comfort level  10/30/2023 0751 by Joss Guerrero RN  Outcome: Progressing  Pt showing no signs of pain at this time. Frequent assessments being done. Problem: Safety - Adult  Goal: Free from fall injury  10/30/2023 0751 by Joss Guerrero RN  Outcome: Progressing  All fall precautions in place. Bed locked and in lowest position with alarm on. Overbed table and personal belonings within reach. Call light within reach and patient instructed to use call light for assistance. Non-skid socks on.

## 2023-10-30 NOTE — PROGRESS NOTES
Patient is alert. Patient is nonverbal and unable to assess orientation. Vital signs are stable. No acute events thus far this shift. [employer] is in place and has adequate output. Lynco Romeo remains in place. Bed is in the lowest position. Bed alarm is activated. Call light is within reach.  Will continue to monitor and reassess

## 2023-10-30 NOTE — PROGRESS NOTES
Occupational Therapy  Facility/Department: [address] [place] [address]   Occupational Therapy Treatment    Name: Daisy Kumari  : 1980  MRN: [id number]  Date of Service: 10/30/2023    Discharge Recommendations:  Daisy Kumari scored a 7/24 on the AM-PAC ADL Inpatient form. Current research shows that an AM-PAC score of 17 or less is typically not associated with a discharge to the patient's home setting. Based on the patient's AM-PAC score and their current ADL deficits, it is recommended that the patient have 5-7 sessions per week of Occupational Therapy at d/c to increase the patient's independence. At this time, this patient demonstrates complex nursing, medical, and rehabilitative needs, and would benefit from intensive rehabilitation services upon discharge from the Inpatient setting. This patient demonstrates the ability to participate in and benefit from an intensive therapy program with a coordinated interdisciplinary team approach to foster frequent, structured, and documented communication among disciplines, who will work together to establish, prioritize, and achieve treatment goals. Please see assessment section for further patient specific details. If patient discharges prior to next session this note will serve as a discharge summary. Please see below for the latest assessment towards goals. Subacute/Skilled Nursing Facility, IP Rehab  OT Equipment Recommendations  Equipment Needed: No  Other: Defer to next level of care       Patient Diagnosis(es): The primary encounter diagnosis was Acute ischemic left MCA stroke ([address]). Diagnoses of Acute cerebrovascular accident (CVA) ([address]) and Left middle cerebral artery stroke [facility]) were also pertinent to this visit. Past Medical History:  has no past medical history on file. Past Surgical History:  has a past surgical history that includes craniotomy (Left, 10/21/2023).     Treatment Diagnosis: Decreased activity tolerance, impaired ADLs and

## 2023-10-30 NOTE — PROGRESS NOTES
Speech Language Pathology  Facility/Department:[facility] 5T ORTHO/NEURO  Dysphagia treatment   Speech/language Evaluation  Name: Gunnar Gross  : 1980  MRN: [id number]                                                         Patient Diagnosis(es):   Patient Active Problem List    Diagnosis Date Noted    Acute ischemic left MCA stroke ([address]) 10/22/2023    Cerebrovascular accident (CVA) due to embolism of left middle cerebral artery ([address]) 10/21/2023    Cerebral edema ([address]) 10/21/2023    Brain compression ([address]) 10/21/2023    Acute respiratory failure with hypoxia ([address]) 10/21/2023       History reviewed. No pertinent past medical history. Past Surgical History:   Procedure Laterality Date    CRANIOTOMY Left 10/21/2023    LEFT DECOMPRESSIVE FLORA-CRANIECTOMY performed by Annemarie Espino MD at [address] [place] [address] for Referral:  Gunnar Gross  was referred for a Speech Therapy evaluation to assess swallow function and/or communication. History of Present Illness  Per MD notes:    41 y.o. female who presented from home to [facility] ED after family found her unconscious. Per ED note, her family states they believe she took multiple illicit drugs as well as large amounts of alcohol and had been sleeping for over 20 hours. EMS was called, gave Narcan with minimal response. On arrival to the ED she was nonverbal, was able to intermittently follow commands. GCS of 9 on arrival. presented outside window for TNK / EVT  S/p L Hemicraniectomy on 10/21/23. At that time CT Head showed large left MCA infarct, 7 mm left-to-right midline shift CTA Head Neck showed left cervical ICA, left intracranial ICA and left M1 MCA occlusions. Prior to transport the patient was intubated.   After arriving here patient underwent hemicraniectomy on 10/22 after declining neuro exam and repeat head CT showing worsening edema, midline shift, and possible uncal herniation     Large vessel occlusion CVA status post instances of smiling, one of which was when thanked pt for working with this therapist.       Treatment:  Dysphagia Goals: The pt will be seen 2-4x/week  to address the following goals:  1-Pt will participate in ongoing bedside assessments when appropriate  10/30- pt alert with restraints in place. Pt had NG for nutrition. Performed oral care with suction. Pt required mod cues to open mouth fully to clean tongue. Pt only able to open mouth partially and was fairly cooperative with oral care. Pt provided with one ice chip. Pt opened mouth to spoon and partially closed lips around the spoon. Once ice chip was in the oral cavity, pt was able to initiate mastication but stopped prior to swallow movement noted. Pt presented with empty spoon and verbal cues which did encourage resuming of mastication. Swallow movement then noted; however, immediate and ongoing reactive coughing noted. Did not present with another ice chip due to concern for pt's safety given rapid response being called after trials with ice chips yesterdat. Recommend con't NPO. Con't goal     2-Family will demonstrate understanding of ST recommendations and POC  10/30- not family present. Pt educated to the purpose of the visit, importance of oral care, and concerns for aspiration. Pt with no evidence of comprehension.  Con't goal       Speech Goals:  Pt will be seen 2-4x/week to address the following goals   1-pt will attempt to communicate via any means 2x per session  2-pt will attempt to indicate yes/no via any means to basic questions 2x per session  3-pt will follow 2  commands per session with MAX cues    Education  See above       Total treatment time: 15 minutes dysphagia tx, 15 Speech Eval     Plan: Continue per POC 2-4x  Recommended Diet:  NPO with oral care with suction 2-3x/day   Medication administration: alternative means      Discharge Recommendation: pt would benefit from ongoing Speech Therapy at the next level of care   Discussed

## 2023-10-30 NOTE — PROGRESS NOTES
Pt alert but nonverbal so unable to assess orientation level. Pt is able to follow more commands and is much more interactive this shift compared to yesterday day shift. No acute events noted this shift. Neuro checks remain unchanged, NIH 25. Pt is voiding adequately via purewick. Corpak remains in place, feeds running per orders. Pt is currently in bed with all fall and safety precautions in place, bed locked and in lowest position, call light and belongings within reach. Family at bedside throughout shift.

## 2023-10-30 NOTE — PLAN OF CARE
Problem: Pain  Goal: Verbalizes/displays adequate comfort level or baseline comfort level  Outcome: Progressing     Problem: Skin/Tissue Integrity  Goal: Absence of new skin breakdown  Description: 1. Monitor for areas of redness and/or skin breakdown  2. Assess vascular access sites hourly  3. Every 4-6 hours minimum:  Change oxygen saturation probe site  4. Every 4-6 hours:  If on nasal continuous positive airway pressure, respiratory therapy assess nares and determine need for appliance change or resting period. Outcome: Progressing     Problem: Safety - Adult  Goal: Free from fall injury  Outcome: Progressing     Problem: Neurosensory - Adult  Goal: Achieves stable or improved neurological status  Outcome: Progressing     Problem: Safety - Medical Restraint  Goal: Remains free of injury from restraints (Restraint for Interference with Medical Device)  Description: INTERVENTIONS:  1. Determine that other, less restrictive measures have been tried or would not be effective before applying the restraint  2. Evaluate the patient's condition at the time of restraint application  3. Inform patient/family regarding the reason for restraint  4. Q2H: Monitor safety, psychosocial status, comfort, nutrition and hydration  Outcome: Progressing     Problem: ABCDS Injury Assessment  Goal: Absence of physical injury  Outcome: Progressing     Problem: Confusion  Goal: Confusion, delirium, dementia, or psychosis is improved or at baseline  Description: INTERVENTIONS:  1. Assess for possible contributors to thought disturbance, including medications, impaired vision or hearing, underlying metabolic abnormalities, dehydration, psychiatric diagnoses, and notify attending LIP  2. [place] high risk fall precautions, as indicated  3. Provide frequent short contacts to provide reality reorientation, refocusing and direction  4. Decrease environmental stimuli, including noise as appropriate  5.  Monitor and intervene to maintain

## 2023-10-30 NOTE — PROGRESS NOTES
Pt's IV in R forearm infiltrated so this RN removed. Zosyn still needing to complete, paused at this time. Midline to L arm flushed and once attached to IV line became occluded and unable to flush. MD made aware. This RN re attempted to gain IV access without success. This RN left voicemail for PICC team for some assistance with midline or regaining access.

## 2023-10-30 NOTE — CONSULTS
Met with pt's sister and other family members at the bedside. Introduced palliative care and had a voluntary discussion about advance care planning. Family reports that the pt is , does not have any children over the age of 25, her father is , and therefore her mother Rui Kruse is her legal next of kin. Answered family's questions about PEG placement. They feel she is improving a little bit each day and would want to pursue PEG placement to give her more time to see how much she can improve. Notified Dr. Petr Wilder to consult KRISTIAN Grimm NP  [address] [facility]

## 2023-10-31 LAB
ANION GAP SERPL CALCULATED.3IONS-SCNC: 12 MMOL/L (ref 3–16)
BUN SERPL-MCNC: 33 MG/DL (ref 7–20)
CALCIUM SERPL-MCNC: 9.2 MG/DL (ref 8.3–10.6)
CHLORIDE SERPL-SCNC: 112 MMOL/L (ref 99–110)
CO2 SERPL-SCNC: 27 MMOL/L (ref 21–32)
CREAT SERPL-MCNC: 0.9 MG/DL (ref 0.6–1.1)
DEPRECATED RDW RBC AUTO: 18.3 % (ref 12.4–15.4)
GFR SERPLBLD CREATININE-BSD FMLA CKD-EPI: >60 ML/MIN/{1.73_M2}
GLUCOSE SERPL-MCNC: 129 MG/DL (ref 70–99)
HCT VFR BLD AUTO: 25.8 % (ref 36–48)
HGB BLD-MCNC: 7.4 G/DL (ref 12–16)
MCH RBC QN AUTO: 20.1 PG (ref 26–34)
MCHC RBC AUTO-ENTMCNC: 28.7 G/DL (ref 31–36)
MCV RBC AUTO: 70 FL (ref 80–100)
PLATELET # BLD AUTO: 570 K/UL (ref 135–450)
PMV BLD AUTO: 9.2 FL (ref 5–10.5)
POTASSIUM SERPL-SCNC: 3.7 MMOL/L (ref 3.5–5.1)
RBC # BLD AUTO: 3.69 M/UL (ref 4–5.2)
SODIUM SERPL-SCNC: 149 MMOL/L (ref 136–145)
SODIUM SERPL-SCNC: 151 MMOL/L (ref 136–145)
WBC # BLD AUTO: 27.4 K/UL (ref 4–11)

## 2023-10-31 PROCEDURE — 80048 BASIC METABOLIC PNL TOTAL CA: CPT

## 2023-10-31 PROCEDURE — 6360000002 HC RX W HCPCS: Performed by: INTERNAL MEDICINE

## 2023-10-31 PROCEDURE — 2580000003 HC RX 258: Performed by: INTERNAL MEDICINE

## 2023-10-31 PROCEDURE — 92507 TX SP LANG VOICE COMM INDIV: CPT

## 2023-10-31 PROCEDURE — 6370000000 HC RX 637 (ALT 250 FOR IP)

## 2023-10-31 PROCEDURE — 2580000003 HC RX 258

## 2023-10-31 PROCEDURE — 92526 ORAL FUNCTION THERAPY: CPT

## 2023-10-31 PROCEDURE — 6360000002 HC RX W HCPCS: Performed by: STUDENT IN AN ORGANIZED HEALTH CARE EDUCATION/TRAINING PROGRAM

## 2023-10-31 PROCEDURE — 85027 COMPLETE CBC AUTOMATED: CPT

## 2023-10-31 PROCEDURE — 2580000003 HC RX 258: Performed by: STUDENT IN AN ORGANIZED HEALTH CARE EDUCATION/TRAINING PROGRAM

## 2023-10-31 PROCEDURE — 84295 ASSAY OF SERUM SODIUM: CPT

## 2023-10-31 PROCEDURE — 94640 AIRWAY INHALATION TREATMENT: CPT

## 2023-10-31 PROCEDURE — 6360000002 HC RX W HCPCS

## 2023-10-31 PROCEDURE — 94761 N-INVAS EAR/PLS OXIMETRY MLT: CPT

## 2023-10-31 PROCEDURE — 36415 COLL VENOUS BLD VENIPUNCTURE: CPT

## 2023-10-31 PROCEDURE — 2060000000 HC ICU INTERMEDIATE R&B

## 2023-10-31 RX ORDER — ALBUTEROL SULFATE 2.5 MG/3ML
2.5 SOLUTION RESPIRATORY (INHALATION)
Status: DISCONTINUED | OUTPATIENT
Start: 2023-10-31 | End: 2023-11-04 | Stop reason: HOSPADM

## 2023-10-31 RX ORDER — SODIUM CHLORIDE FOR INHALATION 3 %
4 VIAL, NEBULIZER (ML) INHALATION
Status: DISCONTINUED | OUTPATIENT
Start: 2023-10-31 | End: 2023-11-04 | Stop reason: HOSPADM

## 2023-10-31 RX ORDER — ALBUTEROL SULFATE 2.5 MG/3ML
2.5 SOLUTION RESPIRATORY (INHALATION) EVERY 6 HOURS PRN
Status: DISCONTINUED | OUTPATIENT
Start: 2023-10-31 | End: 2023-11-04 | Stop reason: HOSPADM

## 2023-10-31 RX ADMIN — ATORVASTATIN CALCIUM 80 MG: 80 TABLET, FILM COATED ORAL at 22:03

## 2023-10-31 RX ADMIN — POLYETHYLENE GLYCOL 3350 17 G: 17 POWDER, FOR SOLUTION ORAL at 22:02

## 2023-10-31 RX ADMIN — SODIUM CHLORIDE 30 MG/ML INHALATION SOLUTION 4 ML: 30 SOLUTION INHALANT at 12:15

## 2023-10-31 RX ADMIN — PIPERACILLIN AND TAZOBACTAM 3375 MG: 3; .375 INJECTION, POWDER, LYOPHILIZED, FOR SOLUTION INTRAVENOUS at 10:42

## 2023-10-31 RX ADMIN — PIPERACILLIN AND TAZOBACTAM 3375 MG: 3; .375 INJECTION, POWDER, LYOPHILIZED, FOR SOLUTION INTRAVENOUS at 17:30

## 2023-10-31 RX ADMIN — FAMOTIDINE 20 MG: 20 TABLET, FILM COATED ORAL at 22:02

## 2023-10-31 RX ADMIN — CHLORHEXIDINE GLUCONATE 15 ML: 1.2 RINSE ORAL at 22:03

## 2023-10-31 RX ADMIN — ALBUTEROL SULFATE 2.5 MG: 2.5 SOLUTION RESPIRATORY (INHALATION) at 12:15

## 2023-10-31 RX ADMIN — SODIUM CHLORIDE, PRESERVATIVE FREE 10 ML: 5 INJECTION INTRAVENOUS at 09:14

## 2023-10-31 RX ADMIN — ENOXAPARIN SODIUM 30 MG: 100 INJECTION SUBCUTANEOUS at 22:52

## 2023-10-31 RX ADMIN — ENOXAPARIN SODIUM 30 MG: 100 INJECTION SUBCUTANEOUS at 09:15

## 2023-10-31 RX ADMIN — ALBUTEROL SULFATE 2.5 MG: 2.5 SOLUTION RESPIRATORY (INHALATION) at 08:22

## 2023-10-31 RX ADMIN — FAMOTIDINE 20 MG: 20 TABLET, FILM COATED ORAL at 09:12

## 2023-10-31 RX ADMIN — SODIUM CHLORIDE 30 MG/ML INHALATION SOLUTION 4 ML: 30 SOLUTION INHALANT at 08:23

## 2023-10-31 RX ADMIN — ASPIRIN 81 MG: 81 TABLET, CHEWABLE ORAL at 09:12

## 2023-10-31 RX ADMIN — SODIUM CHLORIDE 30 MG/ML INHALATION SOLUTION 4 ML: 30 SOLUTION INHALANT at 20:43

## 2023-10-31 RX ADMIN — SODIUM CHLORIDE, PRESERVATIVE FREE 10 ML: 5 INJECTION INTRAVENOUS at 09:13

## 2023-10-31 RX ADMIN — OXYCODONE 10 MG: 5 TABLET ORAL at 17:22

## 2023-10-31 RX ADMIN — AMLODIPINE BESYLATE 5 MG: 5 TABLET ORAL at 09:12

## 2023-10-31 RX ADMIN — PIPERACILLIN AND TAZOBACTAM 3375 MG: 3; .375 INJECTION, POWDER, LYOPHILIZED, FOR SOLUTION INTRAVENOUS at 03:55

## 2023-10-31 RX ADMIN — STANDARDIZED SENNA CONCENTRATE AND DOCUSATE SODIUM 2 TABLET: 8.6; 5 TABLET ORAL at 22:02

## 2023-10-31 RX ADMIN — ALBUTEROL SULFATE 2.5 MG: 2.5 SOLUTION RESPIRATORY (INHALATION) at 20:42

## 2023-10-31 ASSESSMENT — PAIN SCALES - GENERAL
PAINLEVEL_OUTOF10: 2
PAINLEVEL_OUTOF10: 8

## 2023-10-31 ASSESSMENT — PAIN DESCRIPTION - ORIENTATION
ORIENTATION: LEFT
ORIENTATION: LEFT

## 2023-10-31 ASSESSMENT — PAIN - FUNCTIONAL ASSESSMENT: PAIN_FUNCTIONAL_ASSESSMENT: ACTIVITIES ARE NOT PREVENTED

## 2023-10-31 ASSESSMENT — PAIN DESCRIPTION - LOCATION
LOCATION: HEAD
LOCATION: HEAD

## 2023-10-31 ASSESSMENT — PAIN DESCRIPTION - DESCRIPTORS: DESCRIPTORS: ACHING;DISCOMFORT

## 2023-10-31 NOTE — PROGRESS NOTES
Speech Language Pathology  Facility/Department:[facility] 5T ORTHO/NEURO  Dysphagia and speech treatment   Name: Queen Leonora  : 1980  MRN: [id number]                                                         Patient Diagnosis(es):   Patient Active Problem List    Diagnosis Date Noted    Acute ischemic left MCA stroke ([address]) 10/22/2023    Cerebrovascular accident (CVA) due to embolism of left middle cerebral artery ([address]) 10/21/2023    Cerebral edema ([address]) 10/21/2023    Brain compression ([address]) 10/21/2023    Acute respiratory failure with hypoxia ([address]) 10/21/2023       History reviewed. No pertinent past medical history. Past Surgical History:   Procedure Laterality Date    CRANIOTOMY Left 10/21/2023    LEFT DECOMPRESSIVE FLORA-CRANIECTOMY performed by Maureen Harrison MD at [address] [place] [address] for Referral:  Queen Leonora  was referred for a Speech Therapy evaluation to assess swallow function and/or communication. History of Present Illness  Per MD notes:    41 y.o. female who presented from home to [facility] ED after family found her unconscious. Per ED note, her family states they believe she took multiple illicit drugs as well as large amounts of alcohol and had been sleeping for over 20 hours. EMS was called, gave Narcan with minimal response. On arrival to the ED she was nonverbal, was able to intermittently follow commands. GCS of 9 on arrival. presented outside window for TNK / EVT  S/p L Hemicraniectomy on 10/21/23. At that time CT Head showed large left MCA infarct, 7 mm left-to-right midline shift CTA Head Neck showed left cervical ICA, left intracranial ICA and left M1 MCA occlusions. Prior to transport the patient was intubated.   After arriving here patient underwent hemicraniectomy on 10/22 after declining neuro exam and repeat head CT showing worsening edema, midline shift, and possible uncal herniation     Large vessel occlusion CVA status post hemicraniectomy for Did not present pt with further PO trials due to concern for Pt safety. Pt not yet appropriate for instrumental assessment d/t lethargy and limited ability to fully participate in assessment. Recommend con't NPO with oral care with suction. Con't goal     2-Family will demonstrate understanding of ST recommendations and POC  10/30- not family present. Pt educated to the purpose of the visit, importance of oral care, and concerns for aspiration. Pt with no evidence of comprehension. Con't goal   10/31-pt educated to the purpose of the visit, importance of oral care and concerns for aspiration. Pt with no evidence of comprehension. Mother and daughter arrived after the session. Educated family to anatomy and physiology of the swallow, concerns for/high risk for aspiration, rational for NPO recommendation, importance of oral care, plan to continue to monitor appropriateness for instrumental assessment and a description of MBS and FEES. Family stated comprehension and had no further questions. Family also educated to ways to encourage communication with hand out of tasks they can try with pt. Con't goal.       Speech Goals:  Pt will be seen 2-4x/week to address the following goals   1-pt will attempt to communicate via any means 2x per session  10/31- pt waved right hand 3x per session in response to questions as if to say \"ok\" or \"whatever\". Pt made no attempt to verbalize or vocalize. Con't goal     2-pt will attempt to indicate yes/no via any means to basic questions 2x per session  10/31- pt did not attempt to respond verbally or via head nods to yes/no questions. Again pt waved hand in response to 3 yes/no questions. Con't goal    3-pt will follow 2  commands per session with MAX cues  10/31- pt did not follow any commands this session.  Con't goal     Education  See above       Total treatment time: 20 minutes dysphagia tx, 20 Speech tx      Plan: Continue per POC 2-4x  Recommended Diet:  NPO with oral care

## 2023-10-31 NOTE — PROGRESS NOTES
Patient is alert but unable to assess orientation due to pt being nonverbal. VSS on RA. Pt on tube feeds running at 55ml/hr. Patient voiding via purewick. Patient has bilateral UE restraints in place. Q2 checks in place. No further injury noted. Patient to have PEG placed tomorrow or Thursday per GI. Messaged Md regarding swelling to patient's left side of head and face. Md stated it is normal post-op swelling.

## 2023-10-31 NOTE — PROGRESS NOTES
Ophelia Reilly MD    Hospitalist Progress Note      Name:  William Steven /Age/Sex: 1980  (41 y.o. female)   MRN & CSN:  [id number] & [id number] Admission Date/Time: 10/21/2023  5:17 AM   Location:  9561/8127-15 PCP: No primary care provider on file. I saw and examined the patient on 10/31/2023 at 337[address] [employer]: 11  Barriers to discharge: Goals of care discussion, palliative care consult, may need PEG tube placement  Assessment and Plan:     41 y.o. female who presented from home to [facility] ED after family found her unconscious. Per ED note, her family states they believe she took multiple illicit drugs as well as large amounts of alcohol and had been sleeping for over 20 hours. EMS was called, gave Narcan with minimal response. On arrival to the ED she was nonverbal, was able to intermittently follow commands. GCS of 9 on arrival. presented outside window for TNK / EVT  S/p L Hemicraniectomy on 10/21/23. At that time CT Head showed large left MCA infarct, 7 mm left-to-right midline shift CTA Head Neck showed left cervical ICA, left intracranial ICA and left M1 MCA occlusions. Prior to transport the patient was intubated. After arriving here patient underwent hemicraniectomy on 10/22 after declining neuro exam and repeat head CT showing worsening edema, midline shift, and possible uncal herniation     #Large vessel occlusion CVA status post hemicraniectomy for cerebral edema with midline shift:  Followed by neurosurgery and neurology  Continue incisional care, remove staples on POD#14  (Today is POD#9)  Per neurology on aspirin and statin. Outpatient follow-up with stroke clinic     #Dysphagia. - Continue NGT  - Palliative care consulted: family wants to proceed with PEG  - GI consulted to eval for PEG    Aspiration pneumonia with leukocytosis.   UA normal  Aspirated on 10/29/2023  Continue course of antibiotics     Hypernatremia, stable  Sodium goal range between

## 2023-10-31 NOTE — CARE COORDINATION
UPDATE: CM met with pt, pt's mother, and pt's sister, Cristopher Gallego, at bedside. Pt's mother is in agreement with referral to [facility] and has met with Rehana Carr from [facility]. Rehana Carr is continuing to review the referral and will f/u with the CM. Pt's family also provided the pt's SSN: . Pt's family reports if [facility] not able to accept to look at Home at [facility] as a back up option. CM will continue to follow for discharge planning. Electronically signed by ZAYDA Conde on 10/31/2023 at 3:50 PM  [phone]    CM following: CM spoke with Rehana Carr at [facility] and made referral for [facility] location. Rehana Carr will review and reach back out to the CM. CM will continue to follow for discharge planning.   Electronically signed by ZAYDA Conde on 10/31/2023 at 11:28 AM  [phone]

## 2023-10-31 NOTE — PROGRESS NOTES
NIH Stroke Scale  NIH Stroke Scale Assessed: Yes  Interval: Hand-off/Transfer  Level of Consciousness (1a): Alert  LOC Questions (1b): Answers neither question correctly  LOC Commands (1c): Performs neither task correctly  Best Gaze (2): Normal  Visual (3): No visual loss  Facial Palsy (4): (!) Partial paralysis  Motor Arm, Left (5a): No drift  Motor Arm, Right (5b): No movement  Motor Leg, Left (6a): Drift, but does not hit bed  Motor Leg, Right (6b):  No movement  Limb Ataxia (7): (!) Present in two limbs  Sensory (8): (!) Mild to Moderate  Best Language (9): Mute  Dysarthria (10): Severe, unintelligible slurring or mute  Extinction and Inattention (11): (!) Profound kip-inattention or extinction to more than one modality  Total: 25      VU Farrell

## 2023-10-31 NOTE — PROGRESS NOTES
Comprehensive Nutrition Assessment    RECOMMENDATIONS:  Nutrition Support:  Restart EN formula Standard Formula with Fiber  Jevity 1.5 @ 20mL/hr until pt w/ +bm  No bm since 10/26- MD messaged; rec'd bisacodyl   Once +bm, rec'd advancing 25ml q4 until goal rate of 55ml/h achieved  Continue water bolus 300ml every 4 hours  Nutrition Education: Education not appropriate     NUTRITION ASSESSMENT:   Nutritional summary & status: Follow-up. Pt continues to be confused and unsafe for PO intake per SLP. Pt w/ recurrent aspirations per MD- TF held and restarted at 20ml/h over 24h 10/30. Family wanting to pursue PEG placement. GI consult pending. Pt w/o bm x 5 days. MD aware. Rec'd bisacodyl supp. If no bm following suppository, hold TF. Pt remains hypernatremic. Admission // PMH: CVA//stroke, polysubstance abuse, acute respiratory failure    MALNUTRITION ASSESSMENT  Context of Malnutrition: Acute Illness   Malnutrition Status:  At risk for malnutrition (Comment)  Findings of the 6 clinical characteristics of malnutrition (Minimum of 2 out of 6 clinical characteristics is required to make the diagnosis of moderate or severe Protein Calorie Malnutrition based on AND/ASPEN Guidelines):  Energy Intake:  Mild decrease in energy intake (Comment)  Weight Loss:  No significant weight loss     Body Fat Loss:  No significant body fat loss     Muscle Mass Loss:  No significant muscle mass loss    Fluid Accumulation:  No significant fluid accumulation      NUTRITION DIAGNOSIS   Inadequate oral intake related to cognitive or neurological impairment as evidenced by NPO or clear liquid status due to medical condition, nutrition support - enteral nutrition    Nutrition Monitoring and Evaluation:   Food/Nutrient Intake Outcomes:  Enteral Nutrition Intake/Tolerance  Physical Signs/Symptoms Outcomes:  Biochemical Data, Nutrition Focused Physical Findings, Weight     OBJECTIVE DATA: Significant to nutrition assessment  Nutrition Related Findings: Na 150; +bm 10/26; +1 generalized edema  Wounds: None  Nutrition Goals: Tolerate nutrition support at goal rate, within 2 days     CURRENT NUTRITION THERAPIES  Current Tube Feeding (TF) Orders:  Feeding Route: Nasogastric  Formula: Standard with Fiber  Schedule: Continuous  Additives/Modulars: None  Water Flushes: 300ml q4  Goal TF & Flush Orders Provides: Jevity 1.5 @ 55ml/h to provide 1320ml TV, 1980kcal, 84g protein, 1003ml FW  PO Intake: NPO   PO Supplement Intake:NPO  Additional Sources of Calories/IVF:n/a     COMPARATIVE STANDARDS  Energy (kcal):  3180-4221 (15-18)     Protein (g):  81-95 (1.2-1.4)       Fluid (ml/day):  Per free water deficit    ANTHROPOMETRICS  Current Height: 177.8 cm (5' 10\")  Current Weight - Scale: 119.5 kg (263 lb 7.2 oz)    Admission weight: 119.5 kg (263 lb 7.2 oz)    The patient will be monitored per nutrition standards of care. Consult dietitian if additional nutrition interventions are needed prior to RD reassessment.      North Lopez MS, RD, LD:    [place]: [phone]  Office:  [phone]

## 2023-10-31 NOTE — PROGRESS NOTES
Pt. Nonverbal. VSS on RA. Pt. Following some commands. No neuro changes overnight, NIH remains 25. Pt. Pulled out corepack. NG was replaced and ordered abd. X-ray to verify placement. NP notified of results and confirmed placement of NG. Tube feed restarted @ initial rate of 20 ml/hr. Pt. Voiding well via purwick. All fall precautions in place and call light is within reach.

## 2023-10-31 NOTE — PROGRESS NOTES
Current NIHSS 25    Nursing Core Measures for Stroke:   [x]   Education template documentation (STROKE/TIA). Please select only risk factors that are applicable to patient when selecting risk factors. [x]   Care Plan template documentation (Physiologic Instability - Neurosensory). Selecting this will add care plan rows to the flowsheet under the Neuro section of Head to Toe. [x]   Verified Swallow Screen completed prior to PO intake of food, drink, medications  [x]   VTE Prophylaxis: SCDs ordered/addressed; SCDs: N/A Lovenox           (As a reminder, ASA, Plavix, and TPA/TNK are not VTE prophylaxis.)    Reviewed the Following Education with Patient and/or Family:   - Personalized risk factors for patient, along with changes, modifications that will help prevent stroke. - Signs and Symptoms of Stroke: (Facial droop, weakness/numbness especially on one side, speech difficulty, sudden confusion, sudden loss of vision, sudden severe headache, sudden loss of balance or having difficulty walking, syncope, or seizure)  - How to activate EMS (911)   - Importance of Follow Up Appointments at Discharge   - Importance of Compliance with Medications Prescribed at Discharge  - Available community resources and stroke advocacy groups if needed    Patient and/or family member: not available / not appropriate for education at this time. Stroke Education booklet given to patient/family (or verified, if given already), which reviews above information.  yes         Electronically signed by Juan Francisco López RN on 10/31/2023 at 1:07 AM

## 2023-10-31 NOTE — PLAN OF CARE
Problem: Skin/Tissue Integrity  Goal: Absence of new skin breakdown  Description: 1. Monitor for areas of redness and/or skin breakdown  2. Assess vascular access sites hourly  3. Every 4-6 hours minimum:  Change oxygen saturation probe site  4. Every 4-6 hours:  If on nasal continuous positive airway pressure, respiratory therapy assess nares and determine need for appliance change or resting period. Outcome: Progressing   Patient turned every 2 hours and as needed. No further skin issues at this time. Problem: Neurosensory - Adult  Goal: Achieves stable or improved neurological status  Outcome: Progressing   Patient's neurological status has remained unchanged at this time. Problem: Safety - Medical Restraint  Goal: Remains free of injury from restraints (Restraint for Interference with Medical Device)  Description: INTERVENTIONS:  1. Determine that other, less restrictive measures have been tried or would not be effective before applying the restraint  2. Evaluate the patient's condition at the time of restraint application  3. Inform patient/family regarding the reason for restraint  4.  Q2H: Monitor safety, psychosocial status, comfort, nutrition and hydration  10/31/2023 0758 by Prema Pepper RN  Outcome: Progressing  Flowsheets  Taken 10/31/2023 0600 by Harinder Baez RN  Remains free of injury from restraints (restraint for interference with medical device): Determine that other, less restrictive measures have been tried or would not be effective before applying the restraint  Taken 10/31/2023 0400 by Harinder Baez RN  Remains free of injury from restraints (restraint for interference with medical device): Determine that other, less restrictive measures have been tried or would not be effective before applying the restraint  Taken 10/31/2023 0200 by Harinder Baez RN  Remains free of injury from restraints (restraint for interference with medical device): Determine that other, less

## 2023-10-31 NOTE — PLAN OF CARE
Problem: Safety - Adult  Goal: Free from fall injury  10/30/2023 2149 by Quentin Richard RN  Outcome: Progressing  Note: All fall precautions in place and call light is within reach. Problem: Safety - Medical Restraint  Goal: Remains free of injury from restraints (Restraint for Interference with Medical Device)  Description: INTERVENTIONS:  1. Determine that other, less restrictive measures have been tried or would not be effective before applying the restraint  2. Evaluate the patient's condition at the time of restraint application  3. Inform patient/family regarding the reason for restraint  4. Q2H: Monitor safety, psychosocial status, comfort, nutrition and hydration  Outcome: Progressing  Note: Pt. Free of injury from restraint.

## 2023-11-01 ENCOUNTER — ANESTHESIA (OUTPATIENT)
Dept: ENDOSCOPY | Age: 43
End: 2023-11-01
Payer: COMMERCIAL

## 2023-11-01 ENCOUNTER — ANESTHESIA EVENT (OUTPATIENT)
Dept: ENDOSCOPY | Age: 43
End: 2023-11-01
Payer: COMMERCIAL

## 2023-11-01 LAB
ANION GAP SERPL CALCULATED.3IONS-SCNC: 12 MMOL/L (ref 3–16)
BUN SERPL-MCNC: 29 MG/DL (ref 7–20)
CALCIUM SERPL-MCNC: 9.5 MG/DL (ref 8.3–10.6)
CHLORIDE SERPL-SCNC: 111 MMOL/L (ref 99–110)
CO2 SERPL-SCNC: 27 MMOL/L (ref 21–32)
CREAT SERPL-MCNC: 0.9 MG/DL (ref 0.6–1.1)
DEPRECATED RDW RBC AUTO: 18.5 % (ref 12.4–15.4)
GFR SERPLBLD CREATININE-BSD FMLA CKD-EPI: >60 ML/MIN/{1.73_M2}
GLUCOSE SERPL-MCNC: 109 MG/DL (ref 70–99)
HCG SERPL QL: NEGATIVE
HCT VFR BLD AUTO: 28 % (ref 36–48)
HGB BLD-MCNC: 8 G/DL (ref 12–16)
MCH RBC QN AUTO: 20.7 PG (ref 26–34)
MCHC RBC AUTO-ENTMCNC: 28.5 G/DL (ref 31–36)
MCV RBC AUTO: 72.9 FL (ref 80–100)
PLATELET # BLD AUTO: 621 K/UL (ref 135–450)
PMV BLD AUTO: 9.6 FL (ref 5–10.5)
POTASSIUM SERPL-SCNC: 3.9 MMOL/L (ref 3.5–5.1)
RBC # BLD AUTO: 3.84 M/UL (ref 4–5.2)
SODIUM SERPL-SCNC: 149 MMOL/L (ref 136–145)
SODIUM SERPL-SCNC: 150 MMOL/L (ref 136–145)
WBC # BLD AUTO: 28.8 K/UL (ref 4–11)

## 2023-11-01 PROCEDURE — 84703 CHORIONIC GONADOTROPIN ASSAY: CPT

## 2023-11-01 PROCEDURE — 94640 AIRWAY INHALATION TREATMENT: CPT

## 2023-11-01 PROCEDURE — 85027 COMPLETE CBC AUTOMATED: CPT

## 2023-11-01 PROCEDURE — 6360000002 HC RX W HCPCS: Performed by: INTERNAL MEDICINE

## 2023-11-01 PROCEDURE — 3700000001 HC ADD 15 MINUTES (ANESTHESIA): Performed by: INTERNAL MEDICINE

## 2023-11-01 PROCEDURE — 2580000003 HC RX 258

## 2023-11-01 PROCEDURE — 6360000002 HC RX W HCPCS: Performed by: STUDENT IN AN ORGANIZED HEALTH CARE EDUCATION/TRAINING PROGRAM

## 2023-11-01 PROCEDURE — 2720000010 HC SURG SUPPLY STERILE: Performed by: INTERNAL MEDICINE

## 2023-11-01 PROCEDURE — 6370000000 HC RX 637 (ALT 250 FOR IP)

## 2023-11-01 PROCEDURE — 3609013300 HC EGD TUBE PLACEMENT: Performed by: INTERNAL MEDICINE

## 2023-11-01 PROCEDURE — 7100000010 HC PHASE II RECOVERY - FIRST 15 MIN: Performed by: INTERNAL MEDICINE

## 2023-11-01 PROCEDURE — 2060000000 HC ICU INTERMEDIATE R&B

## 2023-11-01 PROCEDURE — 2580000003 HC RX 258: Performed by: STUDENT IN AN ORGANIZED HEALTH CARE EDUCATION/TRAINING PROGRAM

## 2023-11-01 PROCEDURE — 6360000002 HC RX W HCPCS: Performed by: NURSE ANESTHETIST, CERTIFIED REGISTERED

## 2023-11-01 PROCEDURE — 6360000002 HC RX W HCPCS

## 2023-11-01 PROCEDURE — 6360000002 HC RX W HCPCS: Performed by: NURSE PRACTITIONER

## 2023-11-01 PROCEDURE — 2500000003 HC RX 250 WO HCPCS: Performed by: NURSE ANESTHETIST, CERTIFIED REGISTERED

## 2023-11-01 PROCEDURE — 84295 ASSAY OF SERUM SODIUM: CPT

## 2023-11-01 PROCEDURE — 3E0G76Z INTRODUCTION OF NUTRITIONAL SUBSTANCE INTO UPPER GI, VIA NATURAL OR ARTIFICIAL OPENING: ICD-10-PCS | Performed by: INTERNAL MEDICINE

## 2023-11-01 PROCEDURE — 94761 N-INVAS EAR/PLS OXIMETRY MLT: CPT

## 2023-11-01 PROCEDURE — 3700000000 HC ANESTHESIA ATTENDED CARE: Performed by: INTERNAL MEDICINE

## 2023-11-01 PROCEDURE — 2709999900 HC NON-CHARGEABLE SUPPLY: Performed by: INTERNAL MEDICINE

## 2023-11-01 PROCEDURE — 2580000003 HC RX 258: Performed by: INTERNAL MEDICINE

## 2023-11-01 PROCEDURE — 0DH63UZ INSERTION OF FEEDING DEVICE INTO STOMACH, PERCUTANEOUS APPROACH: ICD-10-PCS | Performed by: INTERNAL MEDICINE

## 2023-11-01 PROCEDURE — 36415 COLL VENOUS BLD VENIPUNCTURE: CPT

## 2023-11-01 PROCEDURE — 7100000011 HC PHASE II RECOVERY - ADDTL 15 MIN: Performed by: INTERNAL MEDICINE

## 2023-11-01 PROCEDURE — 80048 BASIC METABOLIC PNL TOTAL CA: CPT

## 2023-11-01 RX ORDER — PROPOFOL 10 MG/ML
INJECTION, EMULSION INTRAVENOUS PRN
Status: DISCONTINUED | OUTPATIENT
Start: 2023-11-01 | End: 2023-11-01 | Stop reason: SDUPTHER

## 2023-11-01 RX ORDER — MIDAZOLAM HYDROCHLORIDE 1 MG/ML
INJECTION INTRAMUSCULAR; INTRAVENOUS PRN
Status: DISCONTINUED | OUTPATIENT
Start: 2023-11-01 | End: 2023-11-01 | Stop reason: SDUPTHER

## 2023-11-01 RX ORDER — SODIUM CHLORIDE, SODIUM LACTATE, POTASSIUM CHLORIDE, CALCIUM CHLORIDE 600; 310; 30; 20 MG/100ML; MG/100ML; MG/100ML; MG/100ML
INJECTION, SOLUTION INTRAVENOUS CONTINUOUS
Status: DISCONTINUED | OUTPATIENT
Start: 2023-11-01 | End: 2023-11-04 | Stop reason: HOSPADM

## 2023-11-01 RX ORDER — MORPHINE SULFATE 2 MG/ML
4 INJECTION, SOLUTION INTRAMUSCULAR; INTRAVENOUS ONCE
Status: COMPLETED | OUTPATIENT
Start: 2023-11-01 | End: 2023-11-01

## 2023-11-01 RX ORDER — KETAMINE HCL IN NACL, ISO-OSM 20 MG/2 ML
SYRINGE (ML) INJECTION PRN
Status: DISCONTINUED | OUTPATIENT
Start: 2023-11-01 | End: 2023-11-01 | Stop reason: SDUPTHER

## 2023-11-01 RX ORDER — LIDOCAINE HYDROCHLORIDE 20 MG/ML
INJECTION, SOLUTION EPIDURAL; INFILTRATION; INTRACAUDAL; PERINEURAL PRN
Status: DISCONTINUED | OUTPATIENT
Start: 2023-11-01 | End: 2023-11-01 | Stop reason: SDUPTHER

## 2023-11-01 RX ADMIN — SODIUM CHLORIDE 30 MG/ML INHALATION SOLUTION 4 ML: 30 SOLUTION INHALANT at 21:02

## 2023-11-01 RX ADMIN — CHLORHEXIDINE GLUCONATE 15 ML: 1.2 RINSE ORAL at 20:06

## 2023-11-01 RX ADMIN — SODIUM CHLORIDE, PRESERVATIVE FREE 10 ML: 5 INJECTION INTRAVENOUS at 10:26

## 2023-11-01 RX ADMIN — MIDAZOLAM HYDROCHLORIDE 2 MG: 2 INJECTION, SOLUTION INTRAMUSCULAR; INTRAVENOUS at 16:08

## 2023-11-01 RX ADMIN — LIDOCAINE HYDROCHLORIDE 100 MG: 20 INJECTION, SOLUTION EPIDURAL; INFILTRATION; INTRACAUDAL; PERINEURAL at 16:04

## 2023-11-01 RX ADMIN — SODIUM CHLORIDE, POTASSIUM CHLORIDE, SODIUM LACTATE AND CALCIUM CHLORIDE: 600; 310; 30; 20 INJECTION, SOLUTION INTRAVENOUS at 15:18

## 2023-11-01 RX ADMIN — PIPERACILLIN AND TAZOBACTAM 3375 MG: 3; .375 INJECTION, POWDER, LYOPHILIZED, FOR SOLUTION INTRAVENOUS at 10:27

## 2023-11-01 RX ADMIN — ALBUTEROL SULFATE 2.5 MG: 2.5 SOLUTION RESPIRATORY (INHALATION) at 09:03

## 2023-11-01 RX ADMIN — SODIUM CHLORIDE 30 MG/ML INHALATION SOLUTION 4 ML: 30 SOLUTION INHALANT at 09:04

## 2023-11-01 RX ADMIN — ALBUTEROL SULFATE 2.5 MG: 2.5 SOLUTION RESPIRATORY (INHALATION) at 21:02

## 2023-11-01 RX ADMIN — PROPOFOL 50 MG: 10 INJECTION, EMULSION INTRAVENOUS at 16:18

## 2023-11-01 RX ADMIN — MORPHINE SULFATE 4 MG: 2 INJECTION, SOLUTION INTRAMUSCULAR; INTRAVENOUS at 20:07

## 2023-11-01 RX ADMIN — PIPERACILLIN AND TAZOBACTAM 3375 MG: 3; .375 INJECTION, POWDER, LYOPHILIZED, FOR SOLUTION INTRAVENOUS at 02:48

## 2023-11-01 RX ADMIN — ENOXAPARIN SODIUM 30 MG: 100 INJECTION SUBCUTANEOUS at 20:07

## 2023-11-01 RX ADMIN — PROPOFOL 50 MG: 10 INJECTION, EMULSION INTRAVENOUS at 16:06

## 2023-11-01 RX ADMIN — Medication 20 MG: at 16:13

## 2023-11-01 RX ADMIN — PROPOFOL 50 MG: 10 INJECTION, EMULSION INTRAVENOUS at 16:13

## 2023-11-01 RX ADMIN — PROPOFOL 50 MG: 10 INJECTION, EMULSION INTRAVENOUS at 16:08

## 2023-11-01 RX ADMIN — PROPOFOL 50 MG: 10 INJECTION, EMULSION INTRAVENOUS at 16:04

## 2023-11-01 RX ADMIN — PIPERACILLIN AND TAZOBACTAM 3375 MG: 3; .375 INJECTION, POWDER, LYOPHILIZED, FOR SOLUTION INTRAVENOUS at 18:18

## 2023-11-01 RX ADMIN — Medication 20 MG: at 16:08

## 2023-11-01 ASSESSMENT — PAIN DESCRIPTION - PAIN TYPE: TYPE: SURGICAL PAIN

## 2023-11-01 ASSESSMENT — PAIN DESCRIPTION - FREQUENCY: FREQUENCY: CONTINUOUS

## 2023-11-01 ASSESSMENT — PAIN - FUNCTIONAL ASSESSMENT
PAIN_FUNCTIONAL_ASSESSMENT: WONG-BAKER FACES
PAIN_FUNCTIONAL_ASSESSMENT: PREVENTS OR INTERFERES WITH ALL ACTIVE AND SOME PASSIVE ACTIVITIES

## 2023-11-01 ASSESSMENT — PAIN DESCRIPTION - DESCRIPTORS: DESCRIPTORS: ACHING

## 2023-11-01 ASSESSMENT — PAIN DESCRIPTION - LOCATION: LOCATION: HEAD

## 2023-11-01 ASSESSMENT — PAIN SCALES - GENERAL
PAINLEVEL_OUTOF10: 0
PAINLEVEL_OUTOF10: 10
PAINLEVEL_OUTOF10: 2

## 2023-11-01 ASSESSMENT — PAIN DESCRIPTION - ORIENTATION: ORIENTATION: LEFT

## 2023-11-01 ASSESSMENT — PAIN DESCRIPTION - ONSET: ONSET: ON-GOING

## 2023-11-01 ASSESSMENT — PAIN SCALES - WONG BAKER
WONGBAKER_NUMERICALRESPONSE: 0
WONGBAKER_NUMERICALRESPONSE: 0

## 2023-11-01 NOTE — PROGRESS NOTES
Pt. Non-verbal. VSS on RA. No neuro changes overnight, NIH remains 25. Pt. Voiding well via Purewick. Pt. Made NPO @midnight. All fall precautions in place and call light is within reach.

## 2023-11-01 NOTE — OP NOTE
EGD PROCEDURE NOTE         Esophagogastroduodenoscopy with Percutaneous Endoscopic Gastrostomy Procedure Note     Patient: Laine Koenig MRN: [id number]   YOB: 1980 Age: 41 y.o. Sex: female   Unit: [facility] Room/Bed: Endo Pool/NONE Location: [address] [place] [address]    Admitting Physician: Gabbie Sebastian     Primary Care Physician: No primary care provider on file. DATE OF PROCEDURE: 11/1/2023  PROCEDURE: Esophagogastroduodenoscopy  INDICATION: This is a 41y.o. year old female who presents today after recent CVA with persistent dysphagia and observed aspiration. NPO status recommended by SLP. Need for durable enteral nutrition. ENDOSCOPIST: Baljit Brooks MD    POSTOPERATIVE DIAGNOSIS:  Hiatal hernia     PLAN:    1) Successful placement of 20Fr gastrostomy (internal bumper type). 2) Nutrition consult for tube feed recommendations. 3) PEG care instructions:   OK to use peg tube for medications in 6 hours. OK to use for nutrition in 12 hours. Change bandage and re apply topical betadine or topical antibiotic 2x per day for 3 days. OK to shower in 3 days. INFORMED CONSENT:  Informed consent for esophagogastoduodenoscopy was obtained. The benefits and risks including adverse medicine reaction have been explained. The patient's questions were answered and the patient agreed to proceed. ASA:  ASA 4 - Patient with severe systemic disease that is a constant threat to life    SEDATION: MAC     The patient's vital signs, cardiac status, pulmonary status, abdominal status and mental status were stable for the procedure. The patient's vitals signs and respiratory function as monitored by oxygen saturation were stable throughout    Procedure Details:    Informed consent was obtained for the procedure. Risks of infection, perforation, hemorrhage, adverse drug reaction, missed lesions and aspiration were discussed.  Zosyn was being administered on the then this was pulled back through the mouth and out through the anterior abdominal wall. The external PEG site was dressed with antibiotic ointment. External bumper was advanced to the 6 cm kaila on the PEG tube. The site was covered appropriately with gauze and an abdominal binder placed. Estimated Blood Loss: minimal  Complications: No immediate complications.    Gastric or Duodenal ulcer present: No        Signed By: Tamera Baker MD

## 2023-11-01 NOTE — PROGRESS NOTES
Speech Language Pathology  HOLD Note    Per chart review and discussion with RN, pt scheduled to have PEG placed this date and is NPO. SLP will see tomorrow as able.      Electronically signed by:  Rowan Winston M.A. [facility]  Speech-Language Pathologist  Pg #: [phone]

## 2023-11-01 NOTE — PROGRESS NOTES
Physical Therapy/Occupational Therapy  Attempt - pt off floor  Chart review completed. Attempted to see pt at 1358 on 11/1/23. Per RN, pt is currently leaving floor for PEG placement. Will re-attempt as schedule allows.     Jaren Brooke, PT

## 2023-11-01 NOTE — CONSULTS
[facility]  GI Consultation                                                                 Patient: Lihn Kamara  : 1980       Date:  10/31/2023    Subjective:       History of Present Illness  Patient is a 41 y.o.  female admitted with Acute cerebrovascular accident (CVA) ([address] [facility]) [I63.9]  Acute ischemic left MCA stroke ([facility]) [I63.512] who is seen in consult for gastrostomy placement. History obtained via chart and from mother at bedside as she is unable to provide history. Patient with history of polysubstance abuse found unresponsive by family. Occlusion of ICA and MCA noted on left and required hemicraniectomy 10/22 after midline shift noted. Patient with dense right hemiplegia, dysphagia with witnessed aspirations, and continues to displace Corpak tubes. NPO status recommended by SLP. Asked to see patient for gastrostomy which patient's mother and Gregory Eduardobush would like to proceed with. History reviewed. No pertinent past medical history. Past Surgical History:   Procedure Laterality Date    CRANIOTOMY Left 10/21/2023    LEFT DECOMPRESSIVE FLORA-CRANIECTOMY performed by Juana Art MD at [facility]          Admission Meds  No current facility-administered medications on file prior to encounter. Current Outpatient Medications on File Prior to Encounter   Medication Sig Dispense Refill    diphenhydrAMINE-APAP, sleep, (TYLENOL PM EXTRA STRENGTH PO) Take 1,000 mg by mouth every 6 hours as needed (pain)             Allergies  No Known Allergies   Social   Social History     Tobacco Use    Smoking status: Every Day     Packs/day: 1     Types: Cigarettes     Start date: 9/15/1992    Smokeless tobacco: Never   Substance Use Topics    Alcohol use: Yes     Alcohol/week: 20.0 standard drinks of alcohol     Types: 20 Standard drinks or equivalent per week        History reviewed. No pertinent family history.    No family history of colon cancer,

## 2023-11-01 NOTE — CARE COORDINATION
CM following: JARVIS spoke with Bel Perez at [facility] who reports they can accept the referral and Bel Perez will submit precert first thing tomorrow AM. CM will continue to follow for discharge planning.   Electronically signed by ZAYDA Fernández on 11/1/2023 at 3:33 PM  [phone]

## 2023-11-01 NOTE — PROGRESS NOTES
VSS on room air. MEREDITH orientation status d/t Pt being nonverbal. Pt is alert and smiles at this RN when this RN talks to her. No acute changes this shift. Pt is NPO for PEG placement. Skin C/D/I with exception to scattered bruising and redness to sacrum and exception to surgical site. Incision C/D/I, no drainage noted. Cleansed with soap and water and painted with CHG. Pt is on specialty mattress. Frequent repositioning provided q2h with pillow support for pressure relief and comfort. Pt is voiding via purewick. All fall precautions in place.

## 2023-11-01 NOTE — PLAN OF CARE
Problem: Discharge Planning  Goal: Discharge to home or other facility with appropriate resources  Outcome: Progressing   CM on board for d/c planning. Problem: Safety - Adult  Goal: Free from fall injury  11/1/2023 1002 by Froylan Ovalle RN  Outcome: Progressing   All fall precautions in place. Bed locked and in lowest position with alarm on. Overbed table and personal belonings within reach. Call light within reach and patient instructed to use call light for assistance. Non-skid socks on. Problem: Nutrition Deficit:  Goal: Optimize nutritional status  Outcome: Progressing   Plan for PEG placement today. Pt was at TF goal with prior NG.

## 2023-11-01 NOTE — PLAN OF CARE
Problem: Pain  Goal: Verbalizes/displays adequate comfort level or baseline comfort level  11/1/2023 1913 by Juan Francisco López RN  Outcome: Progressing  Note: Pt. Managing pain per MAR. Problem: Safety - Adult  Goal: Free from fall injury  11/1/2023 1913 by Juan Francisco López RN  Outcome: Progressing  Note: All fall precautions in place and call light is within reach.

## 2023-11-01 NOTE — PROGRESS NOTES
Pt off floor to endo. Consents and preop checklist completed, in chart. 1 working midline in LUE, flushed and infusing. Gown on nothing underneath. NPO since 1159 on 10/31/23.

## 2023-11-01 NOTE — ANESTHESIA POSTPROCEDURE EVALUATION
Department of Anesthesiology  Postprocedure Note    Patient: Jamir Kaplan  MRN: [id number]  YOB: 1980  Date of evaluation: 11/1/2023      Procedure Summary     Date: 11/01/23 Room / Location: [address] [place] [address]    Anesthesia Start: 4326 Anesthesia Stop: 8367    Procedure: Esophagogastroduodenoscopy with Percutaneous endoscopic gastrostomy tube placement Diagnosis:       Inadequate oral intake      (Inadequate oral intake [R63.8])    Surgeons: Ankit Parrish MD Responsible Provider: Man Bowles MD    Anesthesia Type: MAC, general ASA Status: 4          Anesthesia Type: No value filed.     Anne Marie Phase I: Anne Marie Score: 9    Anne Marie Phase II:        Anesthesia Post Evaluation    Patient location during evaluation: PACU  Patient participation: complete - patient participated  Level of consciousness: awake and alert  Pain score: 0  Airway patency: patent  Nausea & Vomiting: no nausea and no vomiting  Complications: no  Cardiovascular status: hemodynamically stable  Respiratory status: acceptable  Hydration status: euvolemic  Pain management: adequate

## 2023-11-02 LAB
ANION GAP SERPL CALCULATED.3IONS-SCNC: 10 MMOL/L (ref 3–16)
BUN SERPL-MCNC: 32 MG/DL (ref 7–20)
CALCIUM SERPL-MCNC: 9.3 MG/DL (ref 8.3–10.6)
CHLORIDE SERPL-SCNC: 112 MMOL/L (ref 99–110)
CO2 SERPL-SCNC: 26 MMOL/L (ref 21–32)
CREAT SERPL-MCNC: 0.8 MG/DL (ref 0.6–1.1)
DEPRECATED RDW RBC AUTO: 18.6 % (ref 12.4–15.4)
GFR SERPLBLD CREATININE-BSD FMLA CKD-EPI: >60 ML/MIN/{1.73_M2}
GLUCOSE SERPL-MCNC: 165 MG/DL (ref 70–99)
HCT VFR BLD AUTO: 28.2 % (ref 36–48)
HGB BLD-MCNC: 7.9 G/DL (ref 12–16)
MCH RBC QN AUTO: 20.6 PG (ref 26–34)
MCHC RBC AUTO-ENTMCNC: 28 G/DL (ref 31–36)
MCV RBC AUTO: 73.7 FL (ref 80–100)
PLATELET # BLD AUTO: 681 K/UL (ref 135–450)
PMV BLD AUTO: 8.8 FL (ref 5–10.5)
POTASSIUM SERPL-SCNC: 4 MMOL/L (ref 3.5–5.1)
RBC # BLD AUTO: 3.82 M/UL (ref 4–5.2)
SODIUM SERPL-SCNC: 148 MMOL/L (ref 136–145)
SODIUM SERPL-SCNC: 148 MMOL/L (ref 136–145)
WBC # BLD AUTO: 22.9 K/UL (ref 4–11)

## 2023-11-02 PROCEDURE — 97530 THERAPEUTIC ACTIVITIES: CPT

## 2023-11-02 PROCEDURE — 92507 TX SP LANG VOICE COMM INDIV: CPT

## 2023-11-02 PROCEDURE — 2580000003 HC RX 258: Performed by: STUDENT IN AN ORGANIZED HEALTH CARE EDUCATION/TRAINING PROGRAM

## 2023-11-02 PROCEDURE — 84295 ASSAY OF SERUM SODIUM: CPT

## 2023-11-02 PROCEDURE — 85027 COMPLETE CBC AUTOMATED: CPT

## 2023-11-02 PROCEDURE — 97110 THERAPEUTIC EXERCISES: CPT

## 2023-11-02 PROCEDURE — 2580000003 HC RX 258: Performed by: INTERNAL MEDICINE

## 2023-11-02 PROCEDURE — 36415 COLL VENOUS BLD VENIPUNCTURE: CPT

## 2023-11-02 PROCEDURE — 6370000000 HC RX 637 (ALT 250 FOR IP)

## 2023-11-02 PROCEDURE — 2580000003 HC RX 258

## 2023-11-02 PROCEDURE — 6360000002 HC RX W HCPCS: Performed by: INTERNAL MEDICINE

## 2023-11-02 PROCEDURE — 92526 ORAL FUNCTION THERAPY: CPT

## 2023-11-02 PROCEDURE — 80048 BASIC METABOLIC PNL TOTAL CA: CPT

## 2023-11-02 PROCEDURE — 94761 N-INVAS EAR/PLS OXIMETRY MLT: CPT

## 2023-11-02 PROCEDURE — 6360000002 HC RX W HCPCS

## 2023-11-02 PROCEDURE — 97535 SELF CARE MNGMENT TRAINING: CPT

## 2023-11-02 PROCEDURE — 94640 AIRWAY INHALATION TREATMENT: CPT

## 2023-11-02 PROCEDURE — 43762 RPLC GTUBE NO REVJ TRC: CPT

## 2023-11-02 PROCEDURE — 2060000000 HC ICU INTERMEDIATE R&B

## 2023-11-02 PROCEDURE — 6360000002 HC RX W HCPCS: Performed by: STUDENT IN AN ORGANIZED HEALTH CARE EDUCATION/TRAINING PROGRAM

## 2023-11-02 RX ORDER — GUAIFENESIN 200 MG/10ML
200 LIQUID ORAL EVERY 6 HOURS PRN
Qty: 118 ML | Refills: 0
Start: 2023-11-02

## 2023-11-02 RX ORDER — FAMOTIDINE 20 MG/1
20 TABLET, FILM COATED ORAL 2 TIMES DAILY
Qty: 60 TABLET | Refills: 0
Start: 2023-11-02

## 2023-11-02 RX ORDER — ATORVASTATIN CALCIUM 80 MG/1
80 TABLET, FILM COATED ORAL NIGHTLY
Qty: 30 TABLET | Refills: 3
Start: 2023-11-02

## 2023-11-02 RX ORDER — POLYETHYLENE GLYCOL 3350 17 G/17G
17 POWDER, FOR SOLUTION ORAL DAILY PRN
Qty: 30 PACKET | Refills: 0
Start: 2023-11-02 | End: 2023-12-02

## 2023-11-02 RX ORDER — ALBUTEROL SULFATE 2.5 MG/3ML
2.5 SOLUTION RESPIRATORY (INHALATION) EVERY 6 HOURS PRN
Qty: 120 EACH | Refills: 3
Start: 2023-11-02

## 2023-11-02 RX ORDER — BISACODYL 10 MG
10 SUPPOSITORY, RECTAL RECTAL DAILY PRN
Qty: 30 SUPPOSITORY | Refills: 0
Start: 2023-11-02 | End: 2023-12-02

## 2023-11-02 RX ORDER — ASPIRIN 81 MG/1
81 TABLET, CHEWABLE ORAL DAILY
Qty: 30 TABLET | Refills: 3
Start: 2023-11-03

## 2023-11-02 RX ORDER — AMLODIPINE BESYLATE 5 MG/1
5 TABLET ORAL DAILY
Qty: 30 TABLET | Refills: 3
Start: 2023-11-03

## 2023-11-02 RX ORDER — AMOXICILLIN AND CLAVULANATE POTASSIUM 875; 125 MG/1; MG/1
1 TABLET, FILM COATED ORAL 2 TIMES DAILY
Qty: 4 TABLET | Refills: 0
Start: 2023-11-02 | End: 2023-11-04

## 2023-11-02 RX ADMIN — PIPERACILLIN AND TAZOBACTAM 3375 MG: 3; .375 INJECTION, POWDER, LYOPHILIZED, FOR SOLUTION INTRAVENOUS at 02:16

## 2023-11-02 RX ADMIN — STANDARDIZED SENNA CONCENTRATE AND DOCUSATE SODIUM 2 TABLET: 8.6; 5 TABLET ORAL at 09:29

## 2023-11-02 RX ADMIN — SODIUM CHLORIDE, PRESERVATIVE FREE 10 ML: 5 INJECTION INTRAVENOUS at 09:36

## 2023-11-02 RX ADMIN — SODIUM CHLORIDE, PRESERVATIVE FREE 10 ML: 5 INJECTION INTRAVENOUS at 20:12

## 2023-11-02 RX ADMIN — ATORVASTATIN CALCIUM 80 MG: 80 TABLET, FILM COATED ORAL at 20:11

## 2023-11-02 RX ADMIN — POLYETHYLENE GLYCOL 3350 17 G: 17 POWDER, FOR SOLUTION ORAL at 20:11

## 2023-11-02 RX ADMIN — AMLODIPINE BESYLATE 5 MG: 5 TABLET ORAL at 09:29

## 2023-11-02 RX ADMIN — PIPERACILLIN AND TAZOBACTAM 3375 MG: 3; .375 INJECTION, POWDER, LYOPHILIZED, FOR SOLUTION INTRAVENOUS at 18:37

## 2023-11-02 RX ADMIN — FAMOTIDINE 20 MG: 20 TABLET, FILM COATED ORAL at 20:11

## 2023-11-02 RX ADMIN — SODIUM CHLORIDE, PRESERVATIVE FREE 10 ML: 5 INJECTION INTRAVENOUS at 20:11

## 2023-11-02 RX ADMIN — OXYCODONE 10 MG: 5 TABLET ORAL at 06:04

## 2023-11-02 RX ADMIN — ENOXAPARIN SODIUM 30 MG: 100 INJECTION SUBCUTANEOUS at 09:29

## 2023-11-02 RX ADMIN — PIPERACILLIN AND TAZOBACTAM 3375 MG: 3; .375 INJECTION, POWDER, LYOPHILIZED, FOR SOLUTION INTRAVENOUS at 09:49

## 2023-11-02 RX ADMIN — OXYCODONE 10 MG: 5 TABLET ORAL at 20:10

## 2023-11-02 RX ADMIN — FAMOTIDINE 20 MG: 20 TABLET, FILM COATED ORAL at 09:30

## 2023-11-02 RX ADMIN — SODIUM CHLORIDE 30 MG/ML INHALATION SOLUTION 4 ML: 30 SOLUTION INHALANT at 20:43

## 2023-11-02 RX ADMIN — SODIUM CHLORIDE, PRESERVATIVE FREE 10 ML: 5 INJECTION INTRAVENOUS at 09:35

## 2023-11-02 RX ADMIN — CHLORHEXIDINE GLUCONATE 15 ML: 1.2 RINSE ORAL at 09:29

## 2023-11-02 RX ADMIN — ALBUTEROL SULFATE 2.5 MG: 2.5 SOLUTION RESPIRATORY (INHALATION) at 20:42

## 2023-11-02 RX ADMIN — SODIUM CHLORIDE 30 MG/ML INHALATION SOLUTION 4 ML: 30 SOLUTION INHALANT at 08:55

## 2023-11-02 RX ADMIN — ALBUTEROL SULFATE 2.5 MG: 2.5 SOLUTION RESPIRATORY (INHALATION) at 08:53

## 2023-11-02 RX ADMIN — ASPIRIN 81 MG: 81 TABLET, CHEWABLE ORAL at 09:29

## 2023-11-02 RX ADMIN — ENOXAPARIN SODIUM 30 MG: 100 INJECTION SUBCUTANEOUS at 20:23

## 2023-11-02 RX ADMIN — SODIUM CHLORIDE: 9 INJECTION, SOLUTION INTRAVENOUS at 09:48

## 2023-11-02 RX ADMIN — STANDARDIZED SENNA CONCENTRATE AND DOCUSATE SODIUM 2 TABLET: 8.6; 5 TABLET ORAL at 20:10

## 2023-11-02 RX ADMIN — CHLORHEXIDINE GLUCONATE 15 ML: 1.2 RINSE ORAL at 20:11

## 2023-11-02 RX ADMIN — OXYCODONE 10 MG: 5 TABLET ORAL at 14:52

## 2023-11-02 ASSESSMENT — PAIN SCALES - GENERAL
PAINLEVEL_OUTOF10: 8
PAINLEVEL_OUTOF10: 5
PAINLEVEL_OUTOF10: 2
PAINLEVEL_OUTOF10: 7
PAINLEVEL_OUTOF10: 4
PAINLEVEL_OUTOF10: 8

## 2023-11-02 ASSESSMENT — PAIN DESCRIPTION - PAIN TYPE
TYPE: SURGICAL PAIN

## 2023-11-02 ASSESSMENT — PAIN - FUNCTIONAL ASSESSMENT
PAIN_FUNCTIONAL_ASSESSMENT: PREVENTS OR INTERFERES WITH ALL ACTIVE AND SOME PASSIVE ACTIVITIES
PAIN_FUNCTIONAL_ASSESSMENT: ACTIVITIES ARE NOT PREVENTED
PAIN_FUNCTIONAL_ASSESSMENT: PREVENTS OR INTERFERES SOME ACTIVE ACTIVITIES AND ADLS

## 2023-11-02 ASSESSMENT — PAIN DESCRIPTION - ONSET
ONSET: ON-GOING

## 2023-11-02 ASSESSMENT — PAIN DESCRIPTION - FREQUENCY
FREQUENCY: CONTINUOUS

## 2023-11-02 ASSESSMENT — PAIN DESCRIPTION - DESCRIPTORS
DESCRIPTORS: ACHING

## 2023-11-02 ASSESSMENT — PAIN DESCRIPTION - ORIENTATION
ORIENTATION: LEFT

## 2023-11-02 ASSESSMENT — PAIN DESCRIPTION - LOCATION
LOCATION: HEAD

## 2023-11-02 NOTE — PROGRESS NOTES
Speech Language Pathology  Facility/Department:[facility] 5T ORTHO/NEURO  Dysphagia and speech treatment   Name: Milena Alamo  : 1980  MRN: [id number]                                                         Patient Diagnosis(es):   Patient Active Problem List    Diagnosis Date Noted    Acute ischemic left MCA stroke ([address]) 10/22/2023    Cerebrovascular accident (CVA) due to embolism of left middle cerebral artery ([address]) 10/21/2023    Cerebral edema ([address]) 10/21/2023    Brain compression ([address]) 10/21/2023    Acute respiratory failure with hypoxia ([address]) 10/21/2023       History reviewed. No pertinent past medical history. Past Surgical History:   Procedure Laterality Date    CRANIOTOMY Left 10/21/2023    LEFT DECOMPRESSIVE FLORA-CRANIECTOMY performed by Reina Shirley MD at [address] [place] [address] 2023    Esophagogastroduodenoscopy with Percutaneous endoscopic gastrostomy tube placement performed by Anai Loaiza MD at [address] [facility], [address] for Referral:  Milena Alamo  was referred for a Speech Therapy evaluation to assess swallow function and/or communication. History of Present Illness  Per MD notes:    41 y.o. female who presented from home to [facility] ED after family found her unconscious. Per ED note, her family states they believe she took multiple illicit drugs as well as large amounts of alcohol and had been sleeping for over 20 hours. EMS was called, gave Narcan with minimal response. On arrival to the ED she was nonverbal, was able to intermittently follow commands. GCS of 9 on arrival. presented outside window for TNK / EVT  S/p L Hemicraniectomy on 10/21/23. At that time CT Head showed large left MCA infarct, 7 mm left-to-right midline shift CTA Head Neck showed left cervical ICA, left intracranial ICA and left M1 MCA occlusions. Prior to transport the patient was intubated.   After arriving here patient underwent hemicraniectomy response with suspected impaired hyolaryngeal mechanics with incomplete airway protection as pt produces a strong, prolonged reactive cough following trials with ice chips. Did not present pt with further PO trials due to concern for Pt safety. Pt not yet appropriate for instrumental assessment d/t lethargy and limited ability to fully participate in assessment. Recommend con't NPO with oral care with suction. Con't goal   11/2-  pt with intermittent alertness. Performed oral care with suction. No coughing with oral care this date. Pt continues to have difficulty fully opening mouth to toothette for adequate cleaning of lingual surface. Pt analyzed with 3 trials with ice chips. Pt continues to require moderate cues to masticate and swallow. With first trial, pt with immediate reactive/prolonged coughing while masticating, likely due to ice chip melting and spilling over the base of the tongue and aspirated. With second trial, mastication slightly improved with multiple swallows noted with no coughing or throat clearing. With third trial, pt became lethargic while attempting to masticate ice chip; therefore, suctioned melted ice chip from the oral cavity to prevent aspiration. Pt not yet appropriate for instrumental assessment given inability to maintain alertness. Con't goal     2-Family will demonstrate understanding of ST recommendations and POC  10/30- not family present. Pt educated to the purpose of the visit, importance of oral care, and concerns for aspiration. Pt with no evidence of comprehension. Con't goal   10/31-pt educated to the purpose of the visit, importance of oral care and concerns for aspiration. Pt with no evidence of comprehension. Mother and daughter arrived after the session.   Educated family to anatomy and physiology of the swallow, concerns for/high risk for aspiration, rational for NPO recommendation, importance of oral care, plan to continue to monitor appropriateness for Concentration (Mg/Ml): 40.0

## 2023-11-02 NOTE — PROGRESS NOTES
continues to have significant deficits. Increased difficulty with sustaining attention and following commands. Pt sits at EOB for 12 minutes, overall Min A for balance, increased assist required for patient to complete grooming tasks. Requires two persons for bed mobility, but does attempt to assist therapists. Will continue to require IP OT to assist in gaining functional independence. REQUIRES OT FOLLOW-UP: Yes  Activity Tolerance  Activity Tolerance: Patient Tolerated treatment well;Patient limited by fatigue  Activity Tolerance Comments: patient fatigues quickly while seated on EOB. tolerates approx 12 minutes before closing eyes and wanting to return to bed        Plan   Occupational Therapy Plan  Times Per Week: 5-7x  Times Per Day: Once a day  Current Treatment Recommendations: Strengthening, ROM, Balance training, Functional mobility training, Endurance training, Neuromuscular re-education, Cognitive reorientation, Safety education & training, Patient/Caregiver education & training, Equipment evaluation, education, & procurement, Self-Care / ADL, Cognitive/Perceptual training, Coordination training     Restrictions  Position Activity Restriction  Other position/activity restrictions: HOB 30, up as betsey    Subjective   General  Chart Reviewed: Yes  Additional Pertinent Hx: 42 y/o F w/ large L MCA stroke - presented outside window for TNK / EVT  -S/p L Hemicraniectomy on 10/21/23. Self extubated 10/27. Tox screen +  Family / Caregiver Present: No  Referring Practitioner: Clarence Figueroa  Diagnosis: CVA L MCA  Subjective  Subjective: Pt in bed upon entry, nonverbal. Does smile throughout session.   General Comment  Comments: Pt grimaces occasionally, otherwise unable to communicate pain, appears comfortable at rest, RN monitoring  no pain reported, no grimacing noted throughout session  Social/Functional History  Social/Functional History  Additional Comments: Pt non-verbal & unable to give any home information this date.  Pt is from home & assume was independent prior to CVA. No family present to provide info. Objective   Pulse: 90  Heart Rate Source: Monitor  BP: (!) 142/89  BP Location: Right upper arm  BP Method: Automatic  Patient Position: Semi fowlers  MAP (Calculated): 107  Respirations: 16  SpO2: 99 %  O2 Device: None (Room air)             Safety Devices  Type of Devices: Call light within reach; Bed alarm in place;Nurse notified; Left in bed  Restraints  Restraints Initially in Place: No  Bed Mobility Training  Bed Mobility Training: Yes  Rolling: Assist X2;Maximum assistance (able to assist with rolling towards right with use of bed rail and cues for technique)  Supine to Sit: Assist X2;Minimum assistance; Moderate assistance (pt initiates clearing LLE over EOB and utilzes bed rail for trunk elevation. Overall min A of one person at trunk and mod A at BLE to acheive seated position)  Sit to Supine: Assist X2;Maximum assistance  Balance  Sitting: Impaired  Sitting - Static: Supported sitting (tolerates EOB sitting with overall CGA to min A, intermittent periods of mod A to maintain balance d/t participation in functional tasks)  Sitting - Dynamic: Supported sitting (mod A)  Transfer Training  Transfer Training: No        ADL  Feeding: NPO  Grooming Skilled Clinical Factors: pt sits on EOB with CGA-min A to complete oral care and face washing. Max cues and hand over hand assist required to follow commands to wash face. brings cloth to face. Cued to swab mouth with toothette, completes with minmal cues  LE Dressing: Dependent/Total  Toileting: Dependent/Total                 Cognition  Cognition Comment: difficulty following commands this date. requires max repetition and increased time to process. confusion observed but demos ability to follow 50% of one step commands               PROM Exercises: Pt tolerates approx 10 minutes of PROM to RUE, all functional planes addressed. No active movement observed on RUE.

## 2023-11-02 NOTE — PROGRESS NOTES
Physical Therapy  Daily Treatment Note    Discharge Recommendations: Lynn Carrel scored a 6/24 on the AM-PAC short mobility form. Current research shows that an AM-PAC score of 17 or less is typically not associated with a discharge to the patient's home setting. Based on the patient's AM-PAC score and their current functional mobility deficits, it is recommended that the patient have 5-7 sessions per week of Physical Therapy at d/c to increase the patient's independence. At this time, this patient demonstrates complex nursing, medical, and rehabilitative needs, and would benefit from intensive rehabilitation services upon discharge from the Inpatient setting. This patient demonstrates the ability to participate in and benefit from an intensive therapy program with a coordinated interdisciplinary team approach to foster frequent, structured, and documented communication among disciplines, who will work together to establish, prioritize, and achieve treatment goals. Please see assessment section for further patient specific details. Assessment:  Pt following instructions ~50% of the time. Delayed responses noted at times. Continues to need heavy assist x 2 for most bed mobility. No active movement noted R LE. Pt would benefit from continued IP PT at D/C to maximize strength and function. Plan is for ARU. Equipment Needs: Defer to next level of care    Chart Reviewed: Yes     Other position/activity restrictions: HOB 27, up as betsey   Additional Pertinent Hx: 41 y.o. female who was brought in by EMS 10/21 after her sister called due to concerns of somnolence. Pt had been drinking alcohol, doing LSD. EMS gave Narcan with minimal response. Head CT:Large acute left MCA distribution infarct. Intubated upon arrival 10/21. Self extubated 10/27. s/p LEFT DECOMPRESSIVE FLORA-CRANIECTOMY 10/21. Diagnosis: CVA   Treatment Diagnosis: impaired mobility    Subjective: Pt in bed.  Nonverbal. Smiled at therapist

## 2023-11-02 NOTE — PROGRESS NOTES
11/02/23 1454   Encounter Summary   Encounter Overview/Reason  Attempted Encounter   Service Provided For: Patient   Last Encounter  11/02/23  (dottye)   Complexity of Encounter Low   Begin Time 1440   End Time  1445   Total Time Calculated 5 min   Assessment/Intervention/Outcome   Assessment Unable to assess  (sleep?)     Staff Wendy Botello MA, [facility]

## 2023-11-02 NOTE — PROGRESS NOTES
Kitty Finnegan MD    Hospitalist Progress Note      Name:  Deb Davila /Age/Sex: 1980  (41 y.o. female)   MRN & CSN:  [id number] & [id number] Admission Date/Time: 10/21/2023  5:17 AM   Location:  5906/1616-01 PCP: No primary care provider on file. I saw and examined the patient on 2023 at 2:37 PM.    Hospital Day: 13  Barriers to discharge: Goals of care discussion, palliative care consult, may need PEG tube placement  Assessment and Plan:     41 y.o. female who presented from home to [facility] ED after family found her unconscious. Per ED note, her family states they believe she took multiple illicit drugs as well as large amounts of alcohol and had been sleeping for over 20 hours. EMS was called, gave Narcan with minimal response. On arrival to the ED she was nonverbal, was able to intermittently follow commands. GCS of 9 on arrival. presented outside window for TNK / EVT  S/p L Hemicraniectomy on 10/21/23. At that time CT Head showed large left MCA infarct, 7 mm left-to-right midline shift CTA Head Neck showed left cervical ICA, left intracranial ICA and left M1 MCA occlusions. Prior to transport the patient was intubated. After arriving here patient underwent hemicraniectomy on 10/22 after declining neuro exam and repeat head CT showing worsening edema, midline shift, and possible uncal herniation     #Large vessel occlusion CVA status post hemicraniectomy for cerebral edema with midline shift:  Followed by neurosurgery and neurology  Continue incisional care, remove staples on POD#14  (Today is POD#11)  Per neurology on aspirin and statin. Outpatient follow-up with stroke clinic     #Dysphagia. - PEG placed ; 20Fr  - Continue TF  - Topical betadine and abx bid for 3 days    Aspiration pneumonia with leukocytosis.   UA normal  Aspirated on 10/29/2023  Continue course of antibiotics for 7 days total, today is day 4     Hypernatremia, stable  Sodium goal range between tablet Orogastric BID    amLODIPine  5 mg Orogastric Daily    chlorhexidine  15 mL Mouth/Throat BID    sodium chloride flush  5-40 mL IntraVENous 2 times per day      Infusions:    lactated ringers IV soln 100 mL/hr at 11/01/23 1557    sodium chloride      sodium chloride 10 mL/hr at 11/02/23 0948    niCARdipine Stopped (10/23/23 0146)     PRN Meds: albuterol, 2.5 mg, Q6H PRN  guaiFENesin, 200 mg, Q6H PRN  oxyCODONE, 5 mg, Q4H PRN   Or  oxyCODONE, 10 mg, Q4H PRN  sodium chloride flush, 5-40 mL, PRN  sodium chloride, 25 mL, PRN  bisacodyl, 10 mg, Daily PRN  acetaminophen, 650 mg, Q6H PRN  senna, 1 tablet, Nightly PRN  hydrALAZINE, 10 mg, Q6H PRN  sodium chloride flush, 5-40 mL, PRN  sodium chloride, , PRN  labetalol, 10 mg, Q10 Min PRN  prochlorperazine, 10 mg, Q6H PRN

## 2023-11-02 NOTE — CARE COORDINATION
[facility] here today and they need updated PT/OT notes for precert. Awating completion of PT/OT evals to send update to [facility].  Electronically signed by Ravi García RN on 11/2/2023 at 2:40 PM

## 2023-11-02 NOTE — PROGRESS NOTES
Pt. Oriented x4. VSS on RA. Pt. Managing pain per MAR. No neuro changes overnight, NIH remains 25. Pt. Voiding well via purewick. All fall precautions in place and call light is within reach.

## 2023-11-02 NOTE — CARE COORDINATION
[facility] confirmed they will send precert criteria since OT/PT is noted, now, and let us know when they receive it.  Electronically signed by Ravi García RN on 11/2/2023 at 5:08 PM

## 2023-11-02 NOTE — PROGRESS NOTES
VSS on RA. Pt is nonverbal unable to assess orientation. No acute neuro changes this shift. Tube feeds infusing at goal rate of 55 ml/hr. Surgical incision is CDI with no drainage. Incision cleansed with soap and water and CHG swab. Pt is on speciality mattress. Assisted pt with frequent turning and repositioning. Pt voiding via purewick adequately. Standard safety precautions in place. Plan of care to continue.

## 2023-11-03 ENCOUNTER — APPOINTMENT (OUTPATIENT)
Dept: GENERAL RADIOLOGY | Age: 43
End: 2023-11-03
Attending: INTERNAL MEDICINE
Payer: COMMERCIAL

## 2023-11-03 LAB
ANION GAP SERPL CALCULATED.3IONS-SCNC: 9 MMOL/L (ref 3–16)
BUN SERPL-MCNC: 31 MG/DL (ref 7–20)
CALCIUM SERPL-MCNC: 8.9 MG/DL (ref 8.3–10.6)
CHLORIDE SERPL-SCNC: 114 MMOL/L (ref 99–110)
CO2 SERPL-SCNC: 27 MMOL/L (ref 21–32)
CREAT SERPL-MCNC: 0.8 MG/DL (ref 0.6–1.1)
DEPRECATED RDW RBC AUTO: 18.4 % (ref 12.4–15.4)
GFR SERPLBLD CREATININE-BSD FMLA CKD-EPI: >60 ML/MIN/{1.73_M2}
GLUCOSE SERPL-MCNC: 137 MG/DL (ref 70–99)
HCT VFR BLD AUTO: 25.7 % (ref 36–48)
HGB BLD-MCNC: 7.7 G/DL (ref 12–16)
MCH RBC QN AUTO: 21.3 PG (ref 26–34)
MCHC RBC AUTO-ENTMCNC: 29.9 G/DL (ref 31–36)
MCV RBC AUTO: 71.4 FL (ref 80–100)
PLATELET # BLD AUTO: 704 K/UL (ref 135–450)
PMV BLD AUTO: 9 FL (ref 5–10.5)
POTASSIUM SERPL-SCNC: 3.7 MMOL/L (ref 3.5–5.1)
RBC # BLD AUTO: 3.6 M/UL (ref 4–5.2)
SODIUM SERPL-SCNC: 150 MMOL/L (ref 136–145)
WBC # BLD AUTO: 19.7 K/UL (ref 4–11)

## 2023-11-03 PROCEDURE — 80048 BASIC METABOLIC PNL TOTAL CA: CPT

## 2023-11-03 PROCEDURE — 6370000000 HC RX 637 (ALT 250 FOR IP)

## 2023-11-03 PROCEDURE — 2580000003 HC RX 258: Performed by: INTERNAL MEDICINE

## 2023-11-03 PROCEDURE — 6360000002 HC RX W HCPCS: Performed by: INTERNAL MEDICINE

## 2023-11-03 PROCEDURE — 92507 TX SP LANG VOICE COMM INDIV: CPT

## 2023-11-03 PROCEDURE — 6360000002 HC RX W HCPCS: Performed by: NURSE PRACTITIONER

## 2023-11-03 PROCEDURE — 2700000000 HC OXYGEN THERAPY PER DAY

## 2023-11-03 PROCEDURE — 2580000003 HC RX 258

## 2023-11-03 PROCEDURE — 92526 ORAL FUNCTION THERAPY: CPT

## 2023-11-03 PROCEDURE — 6360000002 HC RX W HCPCS

## 2023-11-03 PROCEDURE — 71045 X-RAY EXAM CHEST 1 VIEW: CPT

## 2023-11-03 PROCEDURE — 85027 COMPLETE CBC AUTOMATED: CPT

## 2023-11-03 PROCEDURE — 2060000000 HC ICU INTERMEDIATE R&B

## 2023-11-03 PROCEDURE — 94640 AIRWAY INHALATION TREATMENT: CPT

## 2023-11-03 PROCEDURE — 6360000002 HC RX W HCPCS: Performed by: STUDENT IN AN ORGANIZED HEALTH CARE EDUCATION/TRAINING PROGRAM

## 2023-11-03 PROCEDURE — 2580000003 HC RX 258: Performed by: STUDENT IN AN ORGANIZED HEALTH CARE EDUCATION/TRAINING PROGRAM

## 2023-11-03 PROCEDURE — 36415 COLL VENOUS BLD VENIPUNCTURE: CPT

## 2023-11-03 PROCEDURE — 94761 N-INVAS EAR/PLS OXIMETRY MLT: CPT

## 2023-11-03 PROCEDURE — 6370000000 HC RX 637 (ALT 250 FOR IP): Performed by: STUDENT IN AN ORGANIZED HEALTH CARE EDUCATION/TRAINING PROGRAM

## 2023-11-03 RX ORDER — METOCLOPRAMIDE HYDROCHLORIDE 5 MG/ML
10 INJECTION INTRAMUSCULAR; INTRAVENOUS EVERY 6 HOURS PRN
Status: DISCONTINUED | OUTPATIENT
Start: 2023-11-03 | End: 2023-11-04 | Stop reason: HOSPADM

## 2023-11-03 RX ADMIN — ATORVASTATIN CALCIUM 80 MG: 80 TABLET, FILM COATED ORAL at 20:53

## 2023-11-03 RX ADMIN — CHLORHEXIDINE GLUCONATE 15 ML: 1.2 RINSE ORAL at 08:38

## 2023-11-03 RX ADMIN — PROCHLORPERAZINE EDISYLATE 10 MG: 5 INJECTION INTRAMUSCULAR; INTRAVENOUS at 10:21

## 2023-11-03 RX ADMIN — MINERAL OIL 330 ML: 1000 LIQUID ORAL at 16:33

## 2023-11-03 RX ADMIN — SODIUM CHLORIDE: 9 INJECTION, SOLUTION INTRAVENOUS at 12:17

## 2023-11-03 RX ADMIN — ASPIRIN 81 MG: 81 TABLET, CHEWABLE ORAL at 08:36

## 2023-11-03 RX ADMIN — SODIUM CHLORIDE 30 MG/ML INHALATION SOLUTION 4 ML: 30 SOLUTION INHALANT at 07:57

## 2023-11-03 RX ADMIN — POLYETHYLENE GLYCOL 3350 17 G: 17 POWDER, FOR SOLUTION ORAL at 08:38

## 2023-11-03 RX ADMIN — ENOXAPARIN SODIUM 30 MG: 100 INJECTION SUBCUTANEOUS at 20:55

## 2023-11-03 RX ADMIN — PIPERACILLIN AND TAZOBACTAM 3375 MG: 3; .375 INJECTION, POWDER, LYOPHILIZED, FOR SOLUTION INTRAVENOUS at 10:33

## 2023-11-03 RX ADMIN — OXYCODONE 5 MG: 5 TABLET ORAL at 08:41

## 2023-11-03 RX ADMIN — PROCHLORPERAZINE EDISYLATE 10 MG: 5 INJECTION INTRAMUSCULAR; INTRAVENOUS at 00:27

## 2023-11-03 RX ADMIN — STANDARDIZED SENNA CONCENTRATE AND DOCUSATE SODIUM 2 TABLET: 8.6; 5 TABLET ORAL at 20:53

## 2023-11-03 RX ADMIN — POLYETHYLENE GLYCOL 3350 17 G: 17 POWDER, FOR SOLUTION ORAL at 20:53

## 2023-11-03 RX ADMIN — CHLORHEXIDINE GLUCONATE 15 ML: 1.2 RINSE ORAL at 21:01

## 2023-11-03 RX ADMIN — METOCLOPRAMIDE HYDROCHLORIDE 10 MG: 5 INJECTION INTRAMUSCULAR; INTRAVENOUS at 00:53

## 2023-11-03 RX ADMIN — STANDARDIZED SENNA CONCENTRATE AND DOCUSATE SODIUM 2 TABLET: 8.6; 5 TABLET ORAL at 08:36

## 2023-11-03 RX ADMIN — ENOXAPARIN SODIUM 30 MG: 100 INJECTION SUBCUTANEOUS at 08:37

## 2023-11-03 RX ADMIN — PIPERACILLIN AND TAZOBACTAM 3375 MG: 3; .375 INJECTION, POWDER, LYOPHILIZED, FOR SOLUTION INTRAVENOUS at 03:17

## 2023-11-03 RX ADMIN — SODIUM CHLORIDE, PRESERVATIVE FREE 5 ML: 5 INJECTION INTRAVENOUS at 09:03

## 2023-11-03 RX ADMIN — FAMOTIDINE 20 MG: 20 TABLET, FILM COATED ORAL at 08:36

## 2023-11-03 RX ADMIN — SODIUM CHLORIDE 30 MG/ML INHALATION SOLUTION 4 ML: 30 SOLUTION INHALANT at 21:43

## 2023-11-03 RX ADMIN — PIPERACILLIN AND TAZOBACTAM 3375 MG: 3; .375 INJECTION, POWDER, LYOPHILIZED, FOR SOLUTION INTRAVENOUS at 18:04

## 2023-11-03 RX ADMIN — ALBUTEROL SULFATE 2.5 MG: 2.5 SOLUTION RESPIRATORY (INHALATION) at 07:56

## 2023-11-03 RX ADMIN — AMLODIPINE BESYLATE 5 MG: 5 TABLET ORAL at 08:35

## 2023-11-03 RX ADMIN — SODIUM CHLORIDE: 9 INJECTION, SOLUTION INTRAVENOUS at 10:27

## 2023-11-03 RX ADMIN — ALBUTEROL SULFATE 2.5 MG: 2.5 SOLUTION RESPIRATORY (INHALATION) at 21:43

## 2023-11-03 RX ADMIN — SODIUM CHLORIDE, PRESERVATIVE FREE 10 ML: 5 INJECTION INTRAVENOUS at 22:43

## 2023-11-03 RX ADMIN — FAMOTIDINE 20 MG: 20 TABLET, FILM COATED ORAL at 20:53

## 2023-11-03 ASSESSMENT — PAIN DESCRIPTION - ORIENTATION: ORIENTATION: LEFT

## 2023-11-03 ASSESSMENT — PAIN DESCRIPTION - LOCATION: LOCATION: ABDOMEN

## 2023-11-03 ASSESSMENT — PAIN SCALES - GENERAL: PAINLEVEL_OUTOF10: 0

## 2023-11-03 NOTE — PROGRESS NOTES
Hospitalist Progress Note      Name:  Antwon Dickey /Age/Sex: 1980  (41 y.o. female)   MRN & CSN:  [id number] & [id number] Admission Date/Time: 10/21/2023  5:17 AM   Location:  71/7049-27 PCP: No primary care provider on file. I saw and examined the patient on 11/3/2023 at 4:58 PM.    Hospital Day: 14  Barriers to discharge: Goal feeds  Assessment and Plan:     41 y.o. female who presented from home to [facility] ED after family found her unconscious. Per ED note, her family states they believe she took multiple illicit drugs as well as large amounts of alcohol and had been sleeping for over 20 hours. EMS was called, gave Narcan with minimal response. On arrival to the ED she was nonverbal, was able to intermittently follow commands. GCS of 9 on arrival. presented outside window for TNK / EVT  S/p L Hemicraniectomy on 10/21/23. At that time CT Head showed large left MCA infarct, 7 mm left-to-right midline shift CTA Head Neck showed left cervical ICA, left intracranial ICA and left M1 MCA occlusions. Prior to transport the patient was intubated. After arriving here patient underwent hemicraniectomy on 10/22 after declining neuro exam and repeat head CT showing worsening edema, midline shift, and possible uncal herniation     #Large vessel occlusion CVA status post hemicraniectomy for cerebral edema with midline shift:  Followed by neurosurgery and neurology  Continue incisional care, remove staples on POD#14  (Today is POD#11)  Per neurology on aspirin and statin. Outpatient follow-up with stroke clinic     #Dysphagia. #constipation  - PEG placed ; 20Fr  - Continue TF, working on advancing to goal TF without emesis  - Enema today  - Topical betadine and abx bid for 3 days total    Aspiration pneumonia with leukocytosis.   UA normal  Aspirated on 10/29/2023  Continue course of antibiotics for 7 days total, today is day 4     Hypernatremia, stable  Sodium goal range between

## 2023-11-03 NOTE — PROGRESS NOTES
Pt had another episode of vomiting this morning. Per MD orders hold tube feeds for 2 hours. Compazine given for nausea.

## 2023-11-03 NOTE — PLAN OF CARE
Problem: Discharge Planning  Goal: Discharge to home or other facility with appropriate resources  11/3/2023 1424 by Radha Huang, RN  Outcome: Progressing  Pt involved in discharge planning. Barriers to discharge discussed with pt. Discharge learning needs identified. Discussed with pt any additional needed resources and transportation plans. Problem: Skin/Tissue Integrity  Goal: Absence of new skin breakdown  Description: 1. Monitor for areas of redness and/or skin breakdown  2. Assess vascular access sites hourly  3. Every 4-6 hours minimum:  Change oxygen saturation probe site  4. Every 4-6 hours:  If on nasal continuous positive airway pressure, respiratory therapy assess nares and determine need for appliance change or resting period. Outcome: Progressing  Assisted pt with repositioning and turning q2 to prevent areas of redness and skin breakdown     Problem: Safety - Adult  Goal: Free from fall injury  11/3/2023 1424 by Radha Huang, RN  Outcome: Progressing  All fall precautions in place. Bed locked and in lowest position with alarm on. Overbed table and personal belonings within reach. Call light within reach and patient instructed to use call light for assistance. Non-skid socks on.

## 2023-11-03 NOTE — PROGRESS NOTES
Result   1. No acute cardiopulmonary changes from prior study   2. Stable endotracheal tube and nasogastric tube   3. No evidence of pneumothorax on mechanical ventilation. XR CHEST PORTABLE   Final Result      No acute pulmonary disease. Stable cardiomegaly. XR ABDOMEN (KUB) (SINGLE AP VIEW)   Final Result   1. Nasogastric tube tip projects at the level of the gastric body. MRI BRAIN WO CONTRAST   Final Result      1. Very large acute bland left MCA territory infarct status post decompressive   left hemicraniectomy. Expected increased herniation through the craniotomy   defect and stable 1-2 mm rightward midline shift. XR ABDOMEN (KUB) (SINGLE AP VIEW)   Final Result      The tip of the orogastric tube projects over the gastric body. XR ABDOMEN (KUB) (SINGLE AP VIEW)   Final Result      Recommend advancing nasogastric tube an additional 4-5 cm for more optimal   positioning. CT HEAD WO CONTRAST   Final Result      1. Status post left hemicraniectomy without evidence of complication. Decreased   1 mm rightward midline shift. CT HEAD WO CONTRAST   Final Result      1. Expected evolution of large left MCA territory infarct. No acute hemorrhage. 2.  Persistent hyperdense left MCA consistent with thrombosis as seen on CTA. 3.  Increased 8 mm rightward midline shift, previously 5 mm 14 hours prior, and   suspected mild left uncal herniation. Recommend neurosurgical consultation. Discussed with charge nurse. XR CHEST PORTABLE   Final Result      Asymmetric breast shadow over the left chest with no definite underlying left   lung airspace disease. Stable mild cardiomegaly.         Date of onset: 10/21/23    Current Diet:  Diet NPO  ADULT TUBE FEEDING; Nasogastric; Standard with Fiber; Continuous; 55; No; 300; Q 4 hours    Treatment Diagnosis: Dysphagia/aphasia     Pain:  Did not indicate pain by any means    Prior Dysphagia History:   none    Bedside Swallowing demonstration provided for opening mouth, pt did complete this. Cont goal    Education  Educated pt mother to aphasia/apraxia and ways to facilitate comprehension and communication. Mother stated comprehension     Total treatment time: 11 minutes dysphagia tx, 15 Speech tx      Plan: Continue per POC 2-4x  Recommended Diet:  NPO with oral care with suction 2-3x/day   Medication administration: alternative means    Discharge Recommendation: pt would benefit from ongoing Speech Therapy at the next level of care   Discussed with RNMikey and student nurse  Needs met prior to leaving room, call light within reach  Tea Tapia M.S./[facility]-SLP #[id number]  Pg.  # [phone]  This document will serve as a dc summary if pt dc prior to next visit

## 2023-11-03 NOTE — PROGRESS NOTES
Weight     OBJECTIVE DATA: Significant to nutrition assessment  Nutrition Related Findings: Na 150; +bm 10/26; +1 generalized edema  Wounds: None  Nutrition Goals: Tolerate nutrition support at goal rate, within 2 days     CURRENT NUTRITION THERAPIES  Current Tube Feeding (TF) Orders:  Feeding Route: Nasogastric  Formula: Standard with Fiber  Schedule: Continuous  Additives/Modulars: None  Water Flushes: 300ml q4  Goal TF & Flush Orders Provides: Jevity 1.2 @ goal rate of 65ml/h to provide 1560ml TV, 1872kcal, 87g protein, 1259ml FW  PO Intake: NPO   PO Supplement Intake:NPO  Additional Sources of Calories/IVF:n/a     COMPARATIVE STANDARDS  Energy (kcal):  4446-7869 (15-18)     Protein (g):  81-95 (1.2-1.4)       Fluid (ml/day):  Per free water deficit    ANTHROPOMETRICS  Current Height: 177.8 cm (5' 10\")  Current Weight - Scale: 119.5 kg (263 lb 7.2 oz)    Admission weight: 119.5 kg (263 lb 7.2 oz)    The patient will be monitored per nutrition standards of care. Consult dietitian if additional nutrition interventions are needed prior to RD reassessment.      Adriana Fisher MS, RD, LD:    Yazan: [phone]  Office:  [phone]

## 2023-11-03 NOTE — CARE COORDINATION
UPDATE: CM spoke with pt and pt's mother, Chino Alvarado, at bedside. Chino Alvarado is comfortable with dc to [facility] when medically stable. Please call Chinostewart Alvarado with transport time tomorrow if pt's tube feeds are to goal and are being tolerated for discharge. Electronically signed by ZAYDA Saldana on 11/3/2023 at 4:29 PM  [phone]    CM following: CM has been informed that precert has been approved for [facility] and family is in agreement with [place] location at discharge as beds are full at [facility]. Currently pt is not at tube feed goal and needs to reach goal prior to discharge. CM will continue to follow for discharge planning.   Electronically signed by ZAYDA Saldana on 11/3/2023 at 1:03 PM  [phone]

## 2023-11-03 NOTE — PROGRESS NOTES
Called to evaluate the patient for coughing and possible NT suctioning. Upon entering the room the patient was found to be actively vomiting large amounts of tube feeding. The RN was alerted at this time. No respiratory distress noted at this time.

## 2023-11-03 NOTE — PROGRESS NOTES
Patient is alert in bed. VSS on RA. Patient started vommitting an couching excessively. Patient placed on 2l of O2. Medication administered per MAR. Care team notified. XR done, suction applied to patient. Tube feed held for 4 hours per DR order. Standard safety measure in place. Plan of care ongoing.

## 2023-11-03 NOTE — PLAN OF CARE
Problem: Discharge Planning  Goal: Discharge to home or other facility with appropriate resources  11/3/2023 0118 by Melani Hagen RN  Outcome: Progressing  Flowsheets (Taken 11/3/2023 0118)  Discharge to home or other facility with appropriate resources:   Identify barriers to discharge with patient and caregiver   Arrange for needed discharge resources and transportation as appropriate     Problem: Pain  Goal: Verbalizes/displays adequate comfort level or baseline comfort level  11/3/2023 0118 by Melani Hagen RN  Outcome: Progressing  Flowsheets (Taken 11/3/2023 0118)  Verbalizes/displays adequate comfort level or baseline comfort level:   Encourage patient to monitor pain and request assistance   Assess pain using appropriate pain scale     Problem: Safety - Adult  Goal: Free from fall injury  11/3/2023 0118 by Melani Hagen RN  Outcome: Progressing  Flowsheets (Taken 11/3/2023 0118)  Free From Fall Injury:   Instruct family/caregiver on patient safety   Based on caregiver fall risk screen, instruct family/caregiver to ask for assistance with transferring infant if caregiver noted to have fall risk factors     Problem: Nutrition Deficit:  Goal: Optimize nutritional status  11/3/2023 0118 by Melani Hagen RN  Outcome: Progressing  Flowsheets (Taken 11/3/2023 0118)  Nutrient intake appropriate for improving, restoring, or maintaining nutritional needs:   Assess nutritional status and recommend course of action   Recommend appropriate diets, oral nutritional supplements, and vitamin/mineral supplements     Problem: Confusion  Goal: Confusion, delirium, dementia, or psychosis is improved or at baseline  Description: INTERVENTIONS:  1. Assess for possible contributors to thought disturbance, including medications, impaired vision or hearing, underlying metabolic abnormalities, dehydration, psychiatric diagnoses, and notify attending LIP  2.  [place] high risk fall precautions, as indicated  3. Provide frequent short contacts to provide reality reorientation, refocusing and direction  4. Decrease environmental stimuli, including noise as appropriate  5. Monitor and intervene to maintain adequate nutrition, hydration, elimination, sleep and activity  6. If unable to ensure safety without constant attention obtain sitter and review sitter guidelines with assigned personnel  7.  Initiate Psychosocial CNS and Spiritual Care consult, as indicated  Outcome: Progressing  Flowsheets (Taken 11/3/2023 0118)  Effect of thought disturbance (confusion, delirium, dementia, or psychosis) are managed with adequate functional status:   Assess for contributors to thought disturbance, including medications, impaired vision or hearing, underlying metabolic abnormalities, dehydration, psychiatric diagnoses, notify [address] high risk fall precautions, as indicated

## 2023-11-04 VITALS
HEIGHT: 70 IN | DIASTOLIC BLOOD PRESSURE: 82 MMHG | BODY MASS INDEX: 37.72 KG/M2 | SYSTOLIC BLOOD PRESSURE: 121 MMHG | TEMPERATURE: 97.4 F | WEIGHT: 263.45 LBS | RESPIRATION RATE: 18 BRPM | HEART RATE: 94 BPM | OXYGEN SATURATION: 97 %

## 2023-11-04 LAB
ANION GAP SERPL CALCULATED.3IONS-SCNC: 10 MMOL/L (ref 3–16)
BUN SERPL-MCNC: 28 MG/DL (ref 7–20)
CALCIUM SERPL-MCNC: 8.9 MG/DL (ref 8.3–10.6)
CHLORIDE SERPL-SCNC: 110 MMOL/L (ref 99–110)
CO2 SERPL-SCNC: 27 MMOL/L (ref 21–32)
CREAT SERPL-MCNC: 0.7 MG/DL (ref 0.6–1.1)
DEPRECATED RDW RBC AUTO: 18.9 % (ref 12.4–15.4)
GFR SERPLBLD CREATININE-BSD FMLA CKD-EPI: >60 ML/MIN/{1.73_M2}
GLUCOSE SERPL-MCNC: 126 MG/DL (ref 70–99)
HCT VFR BLD AUTO: 26.6 % (ref 36–48)
HGB BLD-MCNC: 7.7 G/DL (ref 12–16)
MAGNESIUM SERPL-MCNC: 2.4 MG/DL (ref 1.8–2.4)
MCH RBC QN AUTO: 20.7 PG (ref 26–34)
MCHC RBC AUTO-ENTMCNC: 29.1 G/DL (ref 31–36)
MCV RBC AUTO: 70.9 FL (ref 80–100)
PHOSPHATE SERPL-MCNC: 4 MG/DL (ref 2.5–4.9)
PLATELET # BLD AUTO: 791 K/UL (ref 135–450)
PMV BLD AUTO: 9 FL (ref 5–10.5)
POTASSIUM SERPL-SCNC: 3.7 MMOL/L (ref 3.5–5.1)
RBC # BLD AUTO: 3.75 M/UL (ref 4–5.2)
SODIUM SERPL-SCNC: 147 MMOL/L (ref 136–145)
WBC # BLD AUTO: 17.5 K/UL (ref 4–11)

## 2023-11-04 PROCEDURE — 6360000002 HC RX W HCPCS

## 2023-11-04 PROCEDURE — 6360000002 HC RX W HCPCS: Performed by: INTERNAL MEDICINE

## 2023-11-04 PROCEDURE — 80048 BASIC METABOLIC PNL TOTAL CA: CPT

## 2023-11-04 PROCEDURE — 6360000002 HC RX W HCPCS: Performed by: STUDENT IN AN ORGANIZED HEALTH CARE EDUCATION/TRAINING PROGRAM

## 2023-11-04 PROCEDURE — 6370000000 HC RX 637 (ALT 250 FOR IP)

## 2023-11-04 PROCEDURE — 84100 ASSAY OF PHOSPHORUS: CPT

## 2023-11-04 PROCEDURE — 85027 COMPLETE CBC AUTOMATED: CPT

## 2023-11-04 PROCEDURE — 36415 COLL VENOUS BLD VENIPUNCTURE: CPT

## 2023-11-04 PROCEDURE — 94640 AIRWAY INHALATION TREATMENT: CPT

## 2023-11-04 PROCEDURE — 2580000003 HC RX 258: Performed by: INTERNAL MEDICINE

## 2023-11-04 PROCEDURE — 94761 N-INVAS EAR/PLS OXIMETRY MLT: CPT

## 2023-11-04 PROCEDURE — 83735 ASSAY OF MAGNESIUM: CPT

## 2023-11-04 RX ADMIN — POLYETHYLENE GLYCOL 3350 17 G: 17 POWDER, FOR SOLUTION ORAL at 09:17

## 2023-11-04 RX ADMIN — AMLODIPINE BESYLATE 5 MG: 5 TABLET ORAL at 09:17

## 2023-11-04 RX ADMIN — STANDARDIZED SENNA CONCENTRATE AND DOCUSATE SODIUM 2 TABLET: 8.6; 5 TABLET ORAL at 09:17

## 2023-11-04 RX ADMIN — FAMOTIDINE 20 MG: 20 TABLET, FILM COATED ORAL at 09:17

## 2023-11-04 RX ADMIN — PIPERACILLIN AND TAZOBACTAM 3375 MG: 3; .375 INJECTION, POWDER, LYOPHILIZED, FOR SOLUTION INTRAVENOUS at 06:51

## 2023-11-04 RX ADMIN — ENOXAPARIN SODIUM 30 MG: 100 INJECTION SUBCUTANEOUS at 09:17

## 2023-11-04 RX ADMIN — SODIUM CHLORIDE 30 MG/ML INHALATION SOLUTION 4 ML: 30 SOLUTION INHALANT at 08:21

## 2023-11-04 RX ADMIN — CHLORHEXIDINE GLUCONATE 15 ML: 1.2 RINSE ORAL at 09:17

## 2023-11-04 RX ADMIN — ALBUTEROL SULFATE 2.5 MG: 2.5 SOLUTION RESPIRATORY (INHALATION) at 08:20

## 2023-11-04 RX ADMIN — ASPIRIN 81 MG: 81 TABLET, CHEWABLE ORAL at 09:17

## 2023-11-04 ASSESSMENT — PAIN SCALES - GENERAL
PAINLEVEL_OUTOF10: 0

## 2023-11-04 NOTE — DISCHARGE SUMMARY
V2.0  Discharge Summary    Name:  Harley Underwood /Age/Sex: 1980 (41 y.o. female)   Admit Date: 10/21/2023  Discharge Date: 23    MRN & CSN:  [id number] & [id number] Encounter Date and Time 23 12:20 PM EDT    Attending:  Gisela Vargas MD Discharging Provider: Gisela Vargas MD       Hospital Course:     Brief HPI: Harley Underwood is a 41 y.o. female who presented with ***    Brief Problem Based Course:   ***      The patient expressed appropriate understanding of, and agreement with the discharge recommendations, medications, and plan. Consults this admission:  IP CONSULT TO DIETITIAN  IP CONSULT TO NEUROSURGERY  IP CONSULT TO CRITICAL CARE  IP CONSULT TO NEUROSURGERY  IP CONSULT TO GENERAL SURGERY  IP CONSULT TO SPIRITUAL SERVICES  IP CONSULT TO PALLIATIVE CARE  IP CONSULT TO GI    Discharge Diagnosis:   Cerebrovascular accident (CVA) due to embolism of left middle cerebral artery ([address])    ***    Discharge Instruction:   Follow up appointments: ***  Primary care physician: No primary care provider on file.  within 2 weeks  Diet: {diet:27306}   Activity: {discharge activity:67566}  Disposition: Discharged to:   []Home, []C, []SNF, []Acute Rehab, []Hospice ***  Condition on discharge: Stable  Labs and Tests to be Followed up as an outpatient by PCP or Specialist: ***    Discharge Medications:        Medication List        START taking these medications      albuterol (2.5 MG/3ML) 0.083% nebulizer solution  Commonly known as: PROVENTIL  Take 3 mLs by nebulization every 6 hours as needed for Wheezing     amLODIPine 5 MG tablet  Commonly known as: NORVASC  1 tablet by Orogastric route daily     amoxicillin-clavulanate 875-125 MG per tablet  Commonly known as: AUGMENTIN  1 tablet by Per G Tube route 2 times daily for 2 days     aspirin 81 MG chewable tablet  1 tablet by Orogastric route daily     atorvastatin 80 MG tablet  Commonly known as: LIPITOR  1 tablet by Orogastric route nightly

## 2023-11-04 NOTE — PROGRESS NOTES
Patient is alert and oriented to self. Non verbal but follows commands well. Midline is not flushing, secondary PIV was inserted in L wrist and currently infusing. VSS on RA, feeds running at 65ml/hr via PEG tube. Full bed change this AM and bed bath completed. All safety precautions are in place.

## 2023-11-04 NOTE — CARE COORDINATION
Case Management Assessment            Discharge Note                    Date / Time of Note: 11/4/2023 4:05 PM                  Discharge Note Completed by: ZAYDA Harp    Patient Name: Deb Davila   YOB: 1980  Diagnosis: Acute cerebrovascular accident (CVA) [facility]) [I63.9]  Acute ischemic left MCA stroke [facility]) [I63.512]   Date / Time: 10/21/2023  5:17 AM    Current PCP: No primary care provider on file. Clinic patient: No    Hospitalization in the last 30 days: No       Advance Directives:  Code Status: Full Code  [place] DNR form completed and on chart: Not Indicated    Financial:  Payor: [facility] [employer] / Plan: [address] [place] [address] / Product Type: *No Product type* /      Pharmacy:    [address] [place] [address] [phone] - F [phone]  [facility] [address]  Phone: [phone] Fax: [phone]      Assistance purchasing medications?: Potential Assistance Purchasing Medications: No  Assistance provided by Case Management: None at this time    Does patient want to participate in local refill/ meds to beds program?: No    Meds To Beds General Rules:  1. Can ONLY be done Monday- Friday between 8:30am-5pm  2. Prescription(s) must be in pharmacy by 3pm to be filled same day  3. Copy of patient's insurance/ prescription drug card and patient face sheet must be sent along with the prescription(s)  4. Cost of Rx cannot be added to hospital bill. If financial assistance is needed, please contact unit  or ;  or  CANNOT provide pharmacy voucher for patients co-pays  5.  Patients can then  the prescription on their way out of the hospital at discharge, or pharmacy can deliver to the bedside if staff is available. (payment due at time of pick-up or delivery - cash, check, or card accepted)     Able to afford home medications/ co-pay costs:

## 2023-11-04 NOTE — PROGRESS NOTES
Pt is alert in bed. VSS on RA. PT vomited this shift. Tube feeds turned off for 2 hours and restarted at 10 ml/hr. Pt given enema this shift and had bowel movement. Per orders tube feed rate increased by 15 ml/hr. Tube feeds infusing at 25 ml/hr. Frequent suctioning and mouthcare performed. Repositioning and turning q2. Standard safety precautions in place.

## 2023-11-04 NOTE — PLAN OF CARE
Problem: Discharge Planning  Goal: Discharge to home or other facility with appropriate resources  11/4/2023 0039 by Miryam Bustos RN  Outcome: Progressing  Flowsheets (Taken 11/3/2023 2000)  Discharge to home or other facility with appropriate resources: Identify barriers to discharge with patient and caregiver     Problem: Pain  Goal: Verbalizes/displays adequate comfort level or baseline comfort level  11/4/2023 0039 by Miryam Bustos RN  Outcome: Progressing  Flowsheets (Taken 11/3/2023 2237)  Verbalizes/displays adequate comfort level or baseline comfort level: Administer analgesics based on type and severity of pain and evaluate response     Problem: Skin/Tissue Integrity  Goal: Absence of new skin breakdown  Description: 1. Monitor for areas of redness and/or skin breakdown  2. Assess vascular access sites hourly  3. Every 4-6 hours minimum:  Change oxygen saturation probe site  4. Every 4-6 hours:  If on nasal continuous positive airway pressure, respiratory therapy assess nares and determine need for appliance change or resting period.   11/4/2023 0039 by Miryam Bustos RN  Outcome: Progressing     Problem: Nutrition Deficit:  Goal: Optimize nutritional status  Outcome: Progressing     Problem: Neurosensory - Adult  Goal: Achieves stable or improved neurological status  Outcome: Progressing  Flowsheets (Taken 11/3/2023 2000)  Achieves stable or improved neurological status: Assess for and report changes in neurological status     Problem: Chronic Conditions and Co-morbidities  Goal: Patient's chronic conditions and co-morbidity symptoms are monitored and maintained or improved  Outcome: Progressing  Flowsheets (Taken 11/3/2023 2000)  Care Plan - Patient's Chronic Conditions and Co-Morbidity Symptoms are Monitored and Maintained or Improved: Monitor and assess patient's chronic conditions and comorbid symptoms for stability, deterioration, or improvement     Problem: ABCDS Injury

## 2023-11-04 NOTE — DISCHARGE INSTR - DIET

## 2023-11-04 NOTE — PLAN OF CARE
Problem: Discharge Planning  Goal: Discharge to home or other facility with appropriate resources  11/4/2023 0824 by Jasmin Palacios RN  Outcome: Progressing  Flowsheets (Taken 11/4/2023 1335)  Discharge to home or other facility with appropriate resources:   Identify barriers to discharge with patient and caregiver   Arrange for needed discharge resources and transportation as appropriate  Note: Patient will have all needs met prior to discharge   11/4/2023 0039 by Jean Beyer RN  Outcome: Progressing  Flowsheets (Taken 11/3/2023 2000)  Discharge to home or other facility with appropriate resources: Identify barriers to discharge with patient and caregiver     Problem: Skin/Tissue Integrity  Goal: Absence of new skin breakdown  Description: 1. Monitor for areas of redness and/or skin breakdown  2. Assess vascular access sites hourly  3. Every 4-6 hours minimum:  Change oxygen saturation probe site  4. Every 4-6 hours:  If on nasal continuous positive airway pressure, respiratory therapy assess nares and determine need for appliance change or resting period. 11/4/2023 0824 by Jasmin Palacios RN  Outcome: Progressing  Note: Patient will remain free of new skin breakdown by q2 hour turns and changes.   11/4/2023 0039 by Jean Beyer RN  Outcome: Progressing

## 2023-11-06 LAB
BACTERIA SPEC AEROBE CULT: NORMAL
BACTERIA SPEC ANAEROBE CULT: NORMAL
FUNGUS SPEC CULT: NORMAL
LOEFFLER MB STN SPEC: NORMAL

## 2023-11-13 LAB
FUNGUS SPEC CULT: NORMAL
LOEFFLER MB STN SPEC: NORMAL

## 2023-11-15 ENCOUNTER — TELEPHONE (OUTPATIENT)
Dept: CARDIOLOGY CLINIC | Age: 43
End: 2023-11-15

## 2023-11-15 LAB
BACTERIA SPEC AEROBE CULT: ABNORMAL
BACTERIA SPEC ANAEROBE CULT: ABNORMAL
GRAM STAIN RESULT: ABNORMAL
ORGANISM: ABNORMAL

## 2023-11-15 NOTE — TELEPHONE ENCOUNTER
14 Day Jenny CAM ordered to be sent to patient at [facility]. Ordered for recent stroke/afib assessment.

## 2023-11-20 LAB
FUNGUS SPEC CULT: NORMAL
LOEFFLER MB STN SPEC: NORMAL

## 2024-11-21 PROBLEM — Z99.11 DEPENDENCE ON RESPIRATOR (VENTILATOR) STATUS (HCC): Status: ACTIVE | Noted: 2024-11-21

## 2024-11-22 ENCOUNTER — ANESTHESIA (OUTPATIENT)
Dept: OPERATING ROOM | Age: 44
End: 2024-11-22
Payer: COMMERCIAL

## 2024-11-22 ENCOUNTER — ANESTHESIA EVENT (OUTPATIENT)
Dept: OPERATING ROOM | Age: 44
End: 2024-11-22
Payer: COMMERCIAL

## 2024-11-22 ENCOUNTER — APPOINTMENT (OUTPATIENT)
Dept: CT IMAGING | Age: 44
DRG: 089 | End: 2024-11-22
Attending: STUDENT IN AN ORGANIZED HEALTH CARE EDUCATION/TRAINING PROGRAM
Payer: COMMERCIAL

## 2024-11-22 ENCOUNTER — HOSPITAL ENCOUNTER (INPATIENT)
Age: 44
LOS: 2 days | Discharge: HOME | DRG: 089 | End: 2024-11-24
Attending: STUDENT IN AN ORGANIZED HEALTH CARE EDUCATION/TRAINING PROGRAM | Admitting: STUDENT IN AN ORGANIZED HEALTH CARE EDUCATION/TRAINING PROGRAM
Payer: COMMERCIAL

## 2024-11-22 DIAGNOSIS — I63.412 CEREBROVASCULAR ACCIDENT (CVA) DUE TO EMBOLISM OF LEFT MIDDLE CEREBRAL ARTERY (HCC): ICD-10-CM

## 2024-11-22 DIAGNOSIS — M95.2 ACQUIRED SKULL DEFECT: Primary | ICD-10-CM

## 2024-11-22 DIAGNOSIS — I63.512 ACUTE ISCHEMIC LEFT MCA STROKE (HCC): ICD-10-CM

## 2024-11-22 DIAGNOSIS — M95.2 ACQUIRED DEFORMITY OF SKULL: ICD-10-CM

## 2024-11-22 LAB
ABO + RH BLD: NORMAL
ALBUMIN SERPL-MCNC: 4.3 G/DL (ref 3.4–5)
ALBUMIN/GLOB SERPL: 1.2 {RATIO} (ref 1.1–2.2)
ALP SERPL-CCNC: 86 U/L (ref 40–129)
ALT SERPL-CCNC: 12 U/L (ref 10–40)
ANION GAP SERPL CALCULATED.3IONS-SCNC: 15 MMOL/L (ref 3–16)
APTT BLD: 27.8 SEC (ref 22.1–36.4)
AST SERPL-CCNC: 20 U/L (ref 15–37)
BASOPHILS # BLD: 0 K/UL (ref 0–0.2)
BASOPHILS NFR BLD: 0.4 %
BILIRUB SERPL-MCNC: <0.2 MG/DL (ref 0–1)
BLD GP AB SCN SERPL QL: NORMAL
BUN SERPL-MCNC: 13 MG/DL (ref 7–20)
CALCIUM SERPL-MCNC: 9.1 MG/DL (ref 8.3–10.6)
CHLORIDE SERPL-SCNC: 107 MMOL/L (ref 99–110)
CO2 SERPL-SCNC: 20 MMOL/L (ref 21–32)
CREAT SERPL-MCNC: 0.7 MG/DL (ref 0.6–1.1)
DEPRECATED RDW RBC AUTO: 33.4 % (ref 12.4–15.4)
EKG ATRIAL RATE: 74 BPM
EKG DIAGNOSIS: NORMAL
EKG P AXIS: 41 DEGREES
EKG P-R INTERVAL: 200 MS
EKG Q-T INTERVAL: 440 MS
EKG QRS DURATION: 94 MS
EKG QTC CALCULATION (BAZETT): 488 MS
EKG R AXIS: 38 DEGREES
EKG T AXIS: 46 DEGREES
EKG VENTRICULAR RATE: 74 BPM
EOSINOPHIL # BLD: 0 K/UL (ref 0–0.6)
EOSINOPHIL NFR BLD: 0 %
GFR SERPLBLD CREATININE-BSD FMLA CKD-EPI: >90 ML/MIN/{1.73_M2}
GLUCOSE SERPL-MCNC: 157 MG/DL (ref 70–99)
HCG UR QL: NEGATIVE
HCT VFR BLD AUTO: 38.4 % (ref 36–48)
HGB BLD-MCNC: 11.6 G/DL (ref 12–16)
INR PPP: 0.99 (ref 0.85–1.15)
LYMPHOCYTES # BLD: 0.8 K/UL (ref 1–5.1)
LYMPHOCYTES NFR BLD: 6.9 %
MCH RBC QN AUTO: 26 PG (ref 26–34)
MCHC RBC AUTO-ENTMCNC: 30.3 G/DL (ref 31–36)
MCV RBC AUTO: 85.7 FL (ref 80–100)
MONOCYTES # BLD: 0.4 K/UL (ref 0–1.3)
MONOCYTES NFR BLD: 3.2 %
NEUTROPHILS # BLD: 10.2 K/UL (ref 1.7–7.7)
NEUTROPHILS NFR BLD: 89.5 %
PLATELET # BLD AUTO: 257 K/UL (ref 135–450)
PMV BLD AUTO: 8.8 FL (ref 5–10.5)
POTASSIUM SERPL-SCNC: 4 MMOL/L (ref 3.5–5.1)
PROT SERPL-MCNC: 7.8 G/DL (ref 6.4–8.2)
PROTHROMBIN TIME: 13.3 SEC (ref 11.9–14.9)
RBC # BLD AUTO: 4.48 M/UL (ref 4–5.2)
SODIUM SERPL-SCNC: 142 MMOL/L (ref 136–145)
WBC # BLD AUTO: 11.4 K/UL (ref 4–11)

## 2024-11-22 PROCEDURE — 85730 THROMBOPLASTIN TIME PARTIAL: CPT

## 2024-11-22 PROCEDURE — 3700000001 HC ADD 15 MINUTES (ANESTHESIA): Performed by: STUDENT IN AN ORGANIZED HEALTH CARE EDUCATION/TRAINING PROGRAM

## 2024-11-22 PROCEDURE — 6360000002 HC RX W HCPCS: Performed by: STUDENT IN AN ORGANIZED HEALTH CARE EDUCATION/TRAINING PROGRAM

## 2024-11-22 PROCEDURE — 70450 CT HEAD/BRAIN W/O DYE: CPT

## 2024-11-22 PROCEDURE — 87075 CULTR BACTERIA EXCEPT BLOOD: CPT

## 2024-11-22 PROCEDURE — 99223 1ST HOSP IP/OBS HIGH 75: CPT | Performed by: STUDENT IN AN ORGANIZED HEALTH CARE EDUCATION/TRAINING PROGRAM

## 2024-11-22 PROCEDURE — 94640 AIRWAY INHALATION TREATMENT: CPT

## 2024-11-22 PROCEDURE — 1200000000 HC SEMI PRIVATE

## 2024-11-22 PROCEDURE — 87205 SMEAR GRAM STAIN: CPT

## 2024-11-22 PROCEDURE — 6370000000 HC RX 637 (ALT 250 FOR IP): Performed by: NURSE PRACTITIONER

## 2024-11-22 PROCEDURE — 93005 ELECTROCARDIOGRAM TRACING: CPT | Performed by: STUDENT IN AN ORGANIZED HEALTH CARE EDUCATION/TRAINING PROGRAM

## 2024-11-22 PROCEDURE — 2580000003 HC RX 258: Performed by: STUDENT IN AN ORGANIZED HEALTH CARE EDUCATION/TRAINING PROGRAM

## 2024-11-22 PROCEDURE — 00960ZZ DRAINAGE OF CEREBRAL VENTRICLE, OPEN APPROACH: ICD-10-PCS | Performed by: STUDENT IN AN ORGANIZED HEALTH CARE EDUCATION/TRAINING PROGRAM

## 2024-11-22 PROCEDURE — 86900 BLOOD TYPING SEROLOGIC ABO: CPT

## 2024-11-22 PROCEDURE — 0NR107Z REPLACEMENT OF FRONTAL BONE WITH AUTOLOGOUS TISSUE SUBSTITUTE, OPEN APPROACH: ICD-10-PCS | Performed by: STUDENT IN AN ORGANIZED HEALTH CARE EDUCATION/TRAINING PROGRAM

## 2024-11-22 PROCEDURE — 7100000000 HC PACU RECOVERY - FIRST 15 MIN: Performed by: STUDENT IN AN ORGANIZED HEALTH CARE EDUCATION/TRAINING PROGRAM

## 2024-11-22 PROCEDURE — 2720000010 HC SURG SUPPLY STERILE: Performed by: STUDENT IN AN ORGANIZED HEALTH CARE EDUCATION/TRAINING PROGRAM

## 2024-11-22 PROCEDURE — A4217 STERILE WATER/SALINE, 500 ML: HCPCS | Performed by: STUDENT IN AN ORGANIZED HEALTH CARE EDUCATION/TRAINING PROGRAM

## 2024-11-22 PROCEDURE — 85610 PROTHROMBIN TIME: CPT

## 2024-11-22 PROCEDURE — 6360000002 HC RX W HCPCS: Performed by: ANESTHESIOLOGY

## 2024-11-22 PROCEDURE — 87102 FUNGUS ISOLATION CULTURE: CPT

## 2024-11-22 PROCEDURE — C1729 CATH, DRAINAGE: HCPCS | Performed by: STUDENT IN AN ORGANIZED HEALTH CARE EDUCATION/TRAINING PROGRAM

## 2024-11-22 PROCEDURE — 3600000004 HC SURGERY LEVEL 4 BASE: Performed by: STUDENT IN AN ORGANIZED HEALTH CARE EDUCATION/TRAINING PROGRAM

## 2024-11-22 PROCEDURE — C1713 ANCHOR/SCREW BN/BN,TIS/BN: HCPCS | Performed by: STUDENT IN AN ORGANIZED HEALTH CARE EDUCATION/TRAINING PROGRAM

## 2024-11-22 PROCEDURE — 80053 COMPREHEN METABOLIC PANEL: CPT

## 2024-11-22 PROCEDURE — 36415 COLL VENOUS BLD VENIPUNCTURE: CPT

## 2024-11-22 PROCEDURE — 2580000003 HC RX 258: Performed by: ANESTHESIOLOGY

## 2024-11-22 PROCEDURE — 87070 CULTURE OTHR SPECIMN AEROBIC: CPT

## 2024-11-22 PROCEDURE — 6370000000 HC RX 637 (ALT 250 FOR IP): Performed by: STUDENT IN AN ORGANIZED HEALTH CARE EDUCATION/TRAINING PROGRAM

## 2024-11-22 PROCEDURE — 6360000002 HC RX W HCPCS: Performed by: NURSE PRACTITIONER

## 2024-11-22 PROCEDURE — 2500000003 HC RX 250 WO HCPCS: Performed by: NURSE ANESTHETIST, CERTIFIED REGISTERED

## 2024-11-22 PROCEDURE — 3600000014 HC SURGERY LEVEL 4 ADDTL 15MIN: Performed by: STUDENT IN AN ORGANIZED HEALTH CARE EDUCATION/TRAINING PROGRAM

## 2024-11-22 PROCEDURE — 2709999900 HC NON-CHARGEABLE SUPPLY: Performed by: STUDENT IN AN ORGANIZED HEALTH CARE EDUCATION/TRAINING PROGRAM

## 2024-11-22 PROCEDURE — 84703 CHORIONIC GONADOTROPIN ASSAY: CPT

## 2024-11-22 PROCEDURE — 85025 COMPLETE CBC W/AUTO DIFF WBC: CPT

## 2024-11-22 PROCEDURE — 6360000002 HC RX W HCPCS: Performed by: NURSE ANESTHETIST, CERTIFIED REGISTERED

## 2024-11-22 PROCEDURE — 2580000003 HC RX 258: Performed by: NURSE ANESTHETIST, CERTIFIED REGISTERED

## 2024-11-22 PROCEDURE — 86850 RBC ANTIBODY SCREEN: CPT

## 2024-11-22 PROCEDURE — 3700000000 HC ANESTHESIA ATTENDED CARE: Performed by: STUDENT IN AN ORGANIZED HEALTH CARE EDUCATION/TRAINING PROGRAM

## 2024-11-22 PROCEDURE — 86901 BLOOD TYPING SEROLOGIC RH(D): CPT

## 2024-11-22 PROCEDURE — 2500000003 HC RX 250 WO HCPCS: Performed by: STUDENT IN AN ORGANIZED HEALTH CARE EDUCATION/TRAINING PROGRAM

## 2024-11-22 PROCEDURE — 7100000001 HC PACU RECOVERY - ADDTL 15 MIN: Performed by: STUDENT IN AN ORGANIZED HEALTH CARE EDUCATION/TRAINING PROGRAM

## 2024-11-22 PROCEDURE — 93010 ELECTROCARDIOGRAM REPORT: CPT | Performed by: INTERNAL MEDICINE

## 2024-11-22 DEVICE — SCREW, AXS, SELF-DRILLING
Type: IMPLANTABLE DEVICE | Site: SKULL | Status: FUNCTIONAL
Brand: UNIVERSAL NEURO 3

## 2024-11-22 DEVICE — LOW PROFILE BURR HOLE COVER, W/TAB, 14MM
Type: IMPLANTABLE DEVICE | Site: SKULL | Status: FUNCTIONAL
Brand: UNIVERSAL NEURO 2

## 2024-11-22 DEVICE — STRAIGHT PLATE, 12MM BAR, WITH TAB
Type: IMPLANTABLE DEVICE | Site: SKULL | Status: FUNCTIONAL
Brand: UNIVERSAL NEURO 3

## 2024-11-22 DEVICE — STRAIGHT PLATE, 16MM BAR
Type: IMPLANTABLE DEVICE | Site: SKULL | Status: FUNCTIONAL
Brand: UNIVERSAL NEURO 3

## 2024-11-22 RX ORDER — MANNITOL 20 G/100ML
INJECTION, SOLUTION INTRAVENOUS
Status: DISCONTINUED | OUTPATIENT
Start: 2024-11-22 | End: 2024-11-22 | Stop reason: SDUPTHER

## 2024-11-22 RX ORDER — PROCHLORPERAZINE EDISYLATE 5 MG/ML
10 INJECTION INTRAMUSCULAR; INTRAVENOUS EVERY 6 HOURS PRN
Status: DISCONTINUED | OUTPATIENT
Start: 2024-11-22 | End: 2024-11-24 | Stop reason: HOSPADM

## 2024-11-22 RX ORDER — MIDAZOLAM HYDROCHLORIDE 1 MG/ML
2 INJECTION, SOLUTION INTRAMUSCULAR; INTRAVENOUS
Status: DISCONTINUED | OUTPATIENT
Start: 2024-11-22 | End: 2024-11-22

## 2024-11-22 RX ORDER — SODIUM CHLORIDE 9 MG/ML
INJECTION, SOLUTION INTRAVENOUS PRN
Status: DISCONTINUED | OUTPATIENT
Start: 2024-11-22 | End: 2024-11-22

## 2024-11-22 RX ORDER — BACITRACIN ZINC AND POLYMYXIN B SULFATE 500; 1000 [USP'U]/G; [USP'U]/G
OINTMENT TOPICAL PRN
Status: DISCONTINUED | OUTPATIENT
Start: 2024-11-22 | End: 2024-11-22 | Stop reason: ALTCHOICE

## 2024-11-22 RX ORDER — ACETAMINOPHEN 325 MG/1
650 TABLET ORAL EVERY 4 HOURS PRN
Status: DISCONTINUED | OUTPATIENT
Start: 2024-11-22 | End: 2024-11-24 | Stop reason: HOSPADM

## 2024-11-22 RX ORDER — SODIUM CHLORIDE, SODIUM LACTATE, POTASSIUM CHLORIDE, CALCIUM CHLORIDE 600; 310; 30; 20 MG/100ML; MG/100ML; MG/100ML; MG/100ML
INJECTION, SOLUTION INTRAVENOUS CONTINUOUS
Status: DISCONTINUED | OUTPATIENT
Start: 2024-11-22 | End: 2024-11-22 | Stop reason: HOSPADM

## 2024-11-22 RX ORDER — FENTANYL CITRATE 50 UG/ML
INJECTION, SOLUTION INTRAMUSCULAR; INTRAVENOUS
Status: DISCONTINUED | OUTPATIENT
Start: 2024-11-22 | End: 2024-11-22 | Stop reason: SDUPTHER

## 2024-11-22 RX ORDER — SODIUM CHLORIDE 0.9 % (FLUSH) 0.9 %
5-40 SYRINGE (ML) INJECTION EVERY 12 HOURS SCHEDULED
Status: DISCONTINUED | OUTPATIENT
Start: 2024-11-22 | End: 2024-11-22

## 2024-11-22 RX ORDER — SODIUM CHLORIDE 0.9 % (FLUSH) 0.9 %
5-40 SYRINGE (ML) INJECTION PRN
Status: DISCONTINUED | OUTPATIENT
Start: 2024-11-22 | End: 2024-11-24 | Stop reason: HOSPADM

## 2024-11-22 RX ORDER — OXYCODONE HYDROCHLORIDE 5 MG/1
5 TABLET ORAL EVERY 4 HOURS PRN
Status: DISCONTINUED | OUTPATIENT
Start: 2024-11-22 | End: 2024-11-24 | Stop reason: HOSPADM

## 2024-11-22 RX ORDER — HYDROMORPHONE HYDROCHLORIDE 1 MG/ML
0.5 INJECTION, SOLUTION INTRAMUSCULAR; INTRAVENOUS; SUBCUTANEOUS
Status: DISCONTINUED | OUTPATIENT
Start: 2024-11-22 | End: 2024-11-24 | Stop reason: HOSPADM

## 2024-11-22 RX ORDER — SODIUM CHLORIDE 0.9 % (FLUSH) 0.9 %
5-40 SYRINGE (ML) INJECTION EVERY 12 HOURS SCHEDULED
Status: DISCONTINUED | OUTPATIENT
Start: 2024-11-22 | End: 2024-11-24 | Stop reason: HOSPADM

## 2024-11-22 RX ORDER — PROPOFOL 10 MG/ML
INJECTION, EMULSION INTRAVENOUS
Status: DISCONTINUED | OUTPATIENT
Start: 2024-11-22 | End: 2024-11-22 | Stop reason: SDUPTHER

## 2024-11-22 RX ORDER — ALBUTEROL SULFATE 0.83 MG/ML
2.5 SOLUTION RESPIRATORY (INHALATION) EVERY 4 HOURS PRN
Status: DISCONTINUED | OUTPATIENT
Start: 2024-11-22 | End: 2024-11-24 | Stop reason: HOSPADM

## 2024-11-22 RX ORDER — LIDOCAINE HCL/PF 100 MG/5ML
SYRINGE (ML) INJECTION
Status: DISCONTINUED | OUTPATIENT
Start: 2024-11-22 | End: 2024-11-22 | Stop reason: SDUPTHER

## 2024-11-22 RX ORDER — ALBUTEROL SULFATE 0.83 MG/ML
2.5 SOLUTION RESPIRATORY (INHALATION)
Status: DISCONTINUED | OUTPATIENT
Start: 2024-11-22 | End: 2024-11-22

## 2024-11-22 RX ORDER — NALOXONE HYDROCHLORIDE 0.4 MG/ML
0.2 INJECTION, SOLUTION INTRAMUSCULAR; INTRAVENOUS; SUBCUTANEOUS PRN
Status: DISCONTINUED | OUTPATIENT
Start: 2024-11-22 | End: 2024-11-24 | Stop reason: HOSPADM

## 2024-11-22 RX ORDER — SODIUM CHLORIDE 9 MG/ML
INJECTION, SOLUTION INTRAVENOUS PRN
Status: DISCONTINUED | OUTPATIENT
Start: 2024-11-22 | End: 2024-11-24 | Stop reason: HOSPADM

## 2024-11-22 RX ORDER — MIDAZOLAM HYDROCHLORIDE 1 MG/ML
INJECTION, SOLUTION INTRAMUSCULAR; INTRAVENOUS
Status: DISCONTINUED | OUTPATIENT
Start: 2024-11-22 | End: 2024-11-22 | Stop reason: SDUPTHER

## 2024-11-22 RX ORDER — SODIUM CHLORIDE, SODIUM LACTATE, POTASSIUM CHLORIDE, CALCIUM CHLORIDE 600; 310; 30; 20 MG/100ML; MG/100ML; MG/100ML; MG/100ML
INJECTION, SOLUTION INTRAVENOUS
Status: DISCONTINUED | OUTPATIENT
Start: 2024-11-22 | End: 2024-11-22 | Stop reason: SDUPTHER

## 2024-11-22 RX ORDER — BUPIVACAINE HYDROCHLORIDE AND EPINEPHRINE 5; 5 MG/ML; UG/ML
INJECTION, SOLUTION EPIDURAL; INTRACAUDAL; PERINEURAL PRN
Status: DISCONTINUED | OUTPATIENT
Start: 2024-11-22 | End: 2024-11-22 | Stop reason: HOSPADM

## 2024-11-22 RX ORDER — ASPIRIN 81 MG/1
81 TABLET, CHEWABLE ORAL ONCE
Status: DISCONTINUED | OUTPATIENT
Start: 2024-11-25 | End: 2024-11-24 | Stop reason: HOSPADM

## 2024-11-22 RX ORDER — POLYETHYLENE GLYCOL 3350 17 G/17G
17 POWDER, FOR SOLUTION ORAL DAILY
Status: DISCONTINUED | OUTPATIENT
Start: 2024-11-22 | End: 2024-11-24 | Stop reason: HOSPADM

## 2024-11-22 RX ORDER — FENTANYL CITRATE 50 UG/ML
50 INJECTION, SOLUTION INTRAMUSCULAR; INTRAVENOUS EVERY 5 MIN PRN
Status: DISCONTINUED | OUTPATIENT
Start: 2024-11-22 | End: 2024-11-22

## 2024-11-22 RX ORDER — MAGNESIUM HYDROXIDE/ALUMINUM HYDROXICE/SIMETHICONE 120; 1200; 1200 MG/30ML; MG/30ML; MG/30ML
15 SUSPENSION ORAL EVERY 6 HOURS PRN
Status: DISCONTINUED | OUTPATIENT
Start: 2024-11-22 | End: 2024-11-24 | Stop reason: HOSPADM

## 2024-11-22 RX ORDER — HYDROMORPHONE HYDROCHLORIDE 1 MG/ML
0.25 INJECTION, SOLUTION INTRAMUSCULAR; INTRAVENOUS; SUBCUTANEOUS EVERY 5 MIN PRN
Status: DISCONTINUED | OUTPATIENT
Start: 2024-11-22 | End: 2024-11-22

## 2024-11-22 RX ORDER — ONDANSETRON 2 MG/ML
4 INJECTION INTRAMUSCULAR; INTRAVENOUS EVERY 6 HOURS PRN
Status: DISCONTINUED | OUTPATIENT
Start: 2024-11-22 | End: 2024-11-24 | Stop reason: HOSPADM

## 2024-11-22 RX ORDER — ATORVASTATIN CALCIUM 80 MG/1
80 TABLET, FILM COATED ORAL NIGHTLY
Status: DISCONTINUED | OUTPATIENT
Start: 2024-11-22 | End: 2024-11-24 | Stop reason: HOSPADM

## 2024-11-22 RX ORDER — METOCLOPRAMIDE HYDROCHLORIDE 5 MG/ML
10 INJECTION INTRAMUSCULAR; INTRAVENOUS
Status: DISCONTINUED | OUTPATIENT
Start: 2024-11-22 | End: 2024-11-22

## 2024-11-22 RX ORDER — LABETALOL HYDROCHLORIDE 5 MG/ML
10 INJECTION, SOLUTION INTRAVENOUS
Status: DISCONTINUED | OUTPATIENT
Start: 2024-11-22 | End: 2024-11-22

## 2024-11-22 RX ORDER — ONDANSETRON 2 MG/ML
4 INJECTION INTRAMUSCULAR; INTRAVENOUS
Status: DISCONTINUED | OUTPATIENT
Start: 2024-11-22 | End: 2024-11-22

## 2024-11-22 RX ORDER — ENOXAPARIN SODIUM 100 MG/ML
30 INJECTION SUBCUTANEOUS 2 TIMES DAILY
Status: DISCONTINUED | OUTPATIENT
Start: 2024-11-23 | End: 2024-11-24 | Stop reason: HOSPADM

## 2024-11-22 RX ORDER — SODIUM CHLORIDE 9 MG/ML
INJECTION, SOLUTION INTRAVENOUS CONTINUOUS
Status: DISCONTINUED | OUTPATIENT
Start: 2024-11-22 | End: 2024-11-23 | Stop reason: ALTCHOICE

## 2024-11-22 RX ORDER — OXYCODONE HYDROCHLORIDE 5 MG/1
10 TABLET ORAL EVERY 4 HOURS PRN
Status: DISCONTINUED | OUTPATIENT
Start: 2024-11-22 | End: 2024-11-24 | Stop reason: HOSPADM

## 2024-11-22 RX ORDER — ROCURONIUM BROMIDE 10 MG/ML
INJECTION, SOLUTION INTRAVENOUS
Status: DISCONTINUED | OUTPATIENT
Start: 2024-11-22 | End: 2024-11-22 | Stop reason: SDUPTHER

## 2024-11-22 RX ORDER — SCOLOPAMINE TRANSDERMAL SYSTEM 1 MG/1
1 PATCH, EXTENDED RELEASE TRANSDERMAL
Status: DISCONTINUED | OUTPATIENT
Start: 2024-11-22 | End: 2024-11-24 | Stop reason: HOSPADM

## 2024-11-22 RX ORDER — SODIUM CHLORIDE 0.9 % (FLUSH) 0.9 %
5-40 SYRINGE (ML) INJECTION PRN
Status: DISCONTINUED | OUTPATIENT
Start: 2024-11-22 | End: 2024-11-22

## 2024-11-22 RX ORDER — NALOXONE HYDROCHLORIDE 0.4 MG/ML
INJECTION, SOLUTION INTRAMUSCULAR; INTRAVENOUS; SUBCUTANEOUS PRN
Status: DISCONTINUED | OUTPATIENT
Start: 2024-11-22 | End: 2024-11-22

## 2024-11-22 RX ORDER — SENNA AND DOCUSATE SODIUM 50; 8.6 MG/1; MG/1
1 TABLET, FILM COATED ORAL 2 TIMES DAILY
Status: DISCONTINUED | OUTPATIENT
Start: 2024-11-22 | End: 2024-11-24 | Stop reason: HOSPADM

## 2024-11-22 RX ORDER — AMLODIPINE BESYLATE 5 MG/1
5 TABLET ORAL DAILY
Status: DISCONTINUED | OUTPATIENT
Start: 2024-11-22 | End: 2024-11-24 | Stop reason: HOSPADM

## 2024-11-22 RX ORDER — DEXAMETHASONE SODIUM PHOSPHATE 4 MG/ML
INJECTION, SOLUTION INTRA-ARTICULAR; INTRALESIONAL; INTRAMUSCULAR; INTRAVENOUS; SOFT TISSUE
Status: DISCONTINUED | OUTPATIENT
Start: 2024-11-22 | End: 2024-11-22 | Stop reason: SDUPTHER

## 2024-11-22 RX ORDER — LABETALOL HYDROCHLORIDE 5 MG/ML
10 INJECTION, SOLUTION INTRAVENOUS
Status: DISCONTINUED | OUTPATIENT
Start: 2024-11-22 | End: 2024-11-24 | Stop reason: HOSPADM

## 2024-11-22 RX ORDER — MAGNESIUM HYDROXIDE 1200 MG/15ML
LIQUID ORAL CONTINUOUS PRN
Status: COMPLETED | OUTPATIENT
Start: 2024-11-22 | End: 2024-11-22

## 2024-11-22 RX ORDER — FAMOTIDINE 20 MG/1
20 TABLET, FILM COATED ORAL 2 TIMES DAILY
Status: DISCONTINUED | OUTPATIENT
Start: 2024-11-22 | End: 2024-11-24 | Stop reason: HOSPADM

## 2024-11-22 RX ORDER — ONDANSETRON 2 MG/ML
INJECTION INTRAMUSCULAR; INTRAVENOUS
Status: DISCONTINUED | OUTPATIENT
Start: 2024-11-22 | End: 2024-11-22 | Stop reason: SDUPTHER

## 2024-11-22 RX ORDER — PROMETHAZINE HYDROCHLORIDE 25 MG/1
12.5 TABLET ORAL EVERY 6 HOURS PRN
Status: DISCONTINUED | OUTPATIENT
Start: 2024-11-22 | End: 2024-11-24 | Stop reason: HOSPADM

## 2024-11-22 RX ORDER — LEVETIRACETAM 500 MG/5ML
INJECTION, SOLUTION, CONCENTRATE INTRAVENOUS
Status: DISCONTINUED | OUTPATIENT
Start: 2024-11-22 | End: 2024-11-22 | Stop reason: SDUPTHER

## 2024-11-22 RX ORDER — BISACODYL 10 MG
10 SUPPOSITORY, RECTAL RECTAL DAILY PRN
Status: DISCONTINUED | OUTPATIENT
Start: 2024-11-22 | End: 2024-11-24 | Stop reason: HOSPADM

## 2024-11-22 RX ADMIN — PHENYLEPHRINE HYDROCHLORIDE 200 MCG: 10 INJECTION, SOLUTION INTRAVENOUS at 09:43

## 2024-11-22 RX ADMIN — SODIUM CHLORIDE, SODIUM LACTATE, POTASSIUM CHLORIDE, AND CALCIUM CHLORIDE: .6; .31; .03; .02 INJECTION, SOLUTION INTRAVENOUS at 09:45

## 2024-11-22 RX ADMIN — SODIUM CHLORIDE: 9 INJECTION, SOLUTION INTRAVENOUS at 15:26

## 2024-11-22 RX ADMIN — MIDAZOLAM HYDROCHLORIDE 2 MG: 2 INJECTION, SOLUTION INTRAMUSCULAR; INTRAVENOUS at 08:08

## 2024-11-22 RX ADMIN — MIDAZOLAM HYDROCHLORIDE 2 MG: 2 INJECTION, SOLUTION INTRAMUSCULAR; INTRAVENOUS at 11:32

## 2024-11-22 RX ADMIN — PHENYLEPHRINE HYDROCHLORIDE 200 MCG: 10 INJECTION, SOLUTION INTRAVENOUS at 09:55

## 2024-11-22 RX ADMIN — FENTANYL CITRATE 100 MCG: 50 INJECTION, SOLUTION INTRAMUSCULAR; INTRAVENOUS at 08:12

## 2024-11-22 RX ADMIN — CEFAZOLIN 2000 MG: 2 INJECTION, POWDER, FOR SOLUTION INTRAVENOUS at 15:50

## 2024-11-22 RX ADMIN — WATER 2000 MG: 1 INJECTION INTRAMUSCULAR; INTRAVENOUS; SUBCUTANEOUS at 08:19

## 2024-11-22 RX ADMIN — ALBUTEROL SULFATE 2.5 MG: 2.5 SOLUTION RESPIRATORY (INHALATION) at 21:14

## 2024-11-22 RX ADMIN — LEVETIRACETAM 1000 MG: 500 INJECTION, SOLUTION, CONCENTRATE INTRAVENOUS at 08:23

## 2024-11-22 RX ADMIN — PROPOFOL 200 MG: 10 INJECTION, EMULSION INTRAVENOUS at 08:15

## 2024-11-22 RX ADMIN — FENTANYL CITRATE 50 MCG: 50 INJECTION INTRAMUSCULAR; INTRAVENOUS at 12:00

## 2024-11-22 RX ADMIN — SODIUM CHLORIDE, PRESERVATIVE FREE 10 ML: 5 INJECTION INTRAVENOUS at 21:35

## 2024-11-22 RX ADMIN — ROCURONIUM BROMIDE 30 MG: 10 INJECTION, SOLUTION INTRAVENOUS at 10:28

## 2024-11-22 RX ADMIN — Medication 100 MG: at 08:12

## 2024-11-22 RX ADMIN — ROCURONIUM BROMIDE 20 MG: 10 INJECTION, SOLUTION INTRAVENOUS at 09:45

## 2024-11-22 RX ADMIN — FENTANYL CITRATE 100 MCG: 50 INJECTION, SOLUTION INTRAMUSCULAR; INTRAVENOUS at 08:53

## 2024-11-22 RX ADMIN — MANNITOL 100 G: 20 INJECTION, SOLUTION INTRAVENOUS at 08:51

## 2024-11-22 RX ADMIN — ONDANSETRON 4 MG: 2 INJECTION INTRAMUSCULAR; INTRAVENOUS at 08:29

## 2024-11-22 RX ADMIN — SUGAMMADEX 200 MG: 100 INJECTION, SOLUTION INTRAVENOUS at 11:21

## 2024-11-22 RX ADMIN — DEXAMETHASONE SODIUM PHOSPHATE 8 MG: 4 INJECTION INTRA-ARTICULAR; INTRALESIONAL; INTRAMUSCULAR; INTRAVENOUS; SOFT TISSUE at 08:29

## 2024-11-22 RX ADMIN — PROCHLORPERAZINE EDISYLATE 10 MG: 5 INJECTION INTRAMUSCULAR; INTRAVENOUS at 14:28

## 2024-11-22 RX ADMIN — SODIUM CHLORIDE, SODIUM LACTATE, POTASSIUM CHLORIDE, AND CALCIUM CHLORIDE: .6; .31; .03; .02 INJECTION, SOLUTION INTRAVENOUS at 08:08

## 2024-11-22 RX ADMIN — SODIUM CHLORIDE, POTASSIUM CHLORIDE, SODIUM LACTATE AND CALCIUM CHLORIDE: 600; 310; 30; 20 INJECTION, SOLUTION INTRAVENOUS at 07:42

## 2024-11-22 RX ADMIN — ONDANSETRON 4 MG: 2 INJECTION INTRAMUSCULAR; INTRAVENOUS at 11:00

## 2024-11-22 RX ADMIN — HYDROMORPHONE HYDROCHLORIDE 0.5 MG: 1 INJECTION, SOLUTION INTRAMUSCULAR; INTRAVENOUS; SUBCUTANEOUS at 15:50

## 2024-11-22 RX ADMIN — ROCURONIUM BROMIDE 100 MG: 10 INJECTION, SOLUTION INTRAVENOUS at 08:16

## 2024-11-22 ASSESSMENT — LIFESTYLE VARIABLES: SMOKING_STATUS: 1

## 2024-11-22 ASSESSMENT — PAIN DESCRIPTION - LOCATION: LOCATION: HEAD

## 2024-11-22 ASSESSMENT — PAIN SCALES - GENERAL
PAINLEVEL_OUTOF10: 0
PAINLEVEL_OUTOF10: 10
PAINLEVEL_OUTOF10: 4
PAINLEVEL_OUTOF10: 0
PAINLEVEL_OUTOF10: 0

## 2024-11-22 ASSESSMENT — PAIN DESCRIPTION - ORIENTATION: ORIENTATION: OTHER (COMMENT)

## 2024-11-22 ASSESSMENT — PAIN SCALES - WONG BAKER
WONGBAKER_NUMERICALRESPONSE: HURTS WORST
WONGBAKER_NUMERICALRESPONSE: NO HURT

## 2024-11-22 ASSESSMENT — PAIN - FUNCTIONAL ASSESSMENT: PAIN_FUNCTIONAL_ASSESSMENT: PREVENTS OR INTERFERES WITH ALL ACTIVE AND SOME PASSIVE ACTIVITIES

## 2024-11-22 ASSESSMENT — ENCOUNTER SYMPTOMS: SHORTNESS OF BREATH: 0

## 2024-11-22 NOTE — PLAN OF CARE
[facility] Hospitalist brief note  Consult received.  Case reviewed     Full note to follow.    PCP is Chey Parisi APRN - CNP    Thanks  Gato Ramos MD

## 2024-11-22 NOTE — ANESTHESIA POSTPROCEDURE EVALUATION
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    Anne Marie Phase I: Anne Marie Score: 7    Anne Marie Phase II:      Anesthesia Post Evaluation    Patient location during evaluation: PACU  Level of consciousness: awake and alert  Airway patency: patent  Nausea & Vomiting: no vomiting  Cardiovascular status: blood pressure returned to baseline  Respiratory status: acceptable  Hydration status: euvolemic  Multimodal analgesia pain management approach  Pain management: adequate    No notable events documented.

## 2024-11-22 NOTE — ANESTHESIA PRE PROCEDURE
Department of Anesthesiology  Preprocedure Note       Name:  Melody Carroll   Age:  44 y.o.  :  1980                                          MRN:  [id number]         Date:  2024      Surgeon: Surgeon(s):  Dane Ramírez MD    Procedure: Procedure(s):  LEFT CRANIOPLASTY    Medications prior to admission:   Prior to Admission medications    Medication Sig Start Date End Date Taking? Authorizing Provider   amLODIPine (NORVASC) 5 MG tablet TAKE 1 TABLET VIA G-TUBE DAILY 24  Yes Chey Parisi APRN - CNP   albuterol sulfate HFA (PROVENTIL;VENTOLIN;PROAIR) 108 (90 Base) MCG/ACT inhaler INHALE 2 PUFFS BY MOUTH 4 TIMES A DAY AS NEEDED FOR WHEEZING 24  Yes Chey Parisi APRN - CNP   OnabotulinumtoxinA (BOTOX IJ) Inject 6 each as directed every 3 months 3 injections in arms/ 3 injections in legs for muscle strengthening @ [facility]   Yes Provider, MD Pola   albuterol (PROVENTIL) (2.5 MG/3ML) 0.083% nebulizer solution USE 3 MILLILITERS VIA NEBULIZATION BY MOUTH EVERY 6 HOURS AS NEEDED FOR WHEEZING 24  Yes Chey Parisi APRN - CNP   famotidine (PEPCID) 20 MG tablet TAKE 1 TABLET BY MOUTH 2 TIMES A DAY 24   Chey Parisi APRN - CNP   nicotine (NICODERM CQ) 7 MG/24HR Place 1 patch onto the skin daily for 14 days 24  Chey Parisi APRN - CNP   naproxen (NAPROSYN) 500 MG tablet TAKE 1 TABLET BY MOUTH TWICE A DAY WITH FOOD AS NEEDED FOR PAIN 24   Chey Parisi APRN - CNP   atorvastatin (LIPITOR) 80 MG tablet Take 1 tablet by mouth nightly at bedtime. 24   Adrian Haney MD   ASPIRIN LOW DOSE 81 MG chewable tablet CHEW 1 TABLET BY MOUTH DAILY 24   Chey Parisi APRN - CNP   Spacer/Aero-Holding Chambers ROBERTO 1 Device by Does not apply route daily as needed (use with albuterol) 23   Chey Parisi APRN - CNP       Current medications:    Current Facility-Administered Medications   Medication Dose Route Frequency Provider Last

## 2024-11-22 NOTE — H&P
History of Present Illness       HPI: 43y F returns for delayed follow up. She underwent left hemicraniectomy 10/2023 for large ischemic stroke. She is now living at home, with her mother. She has persistent expressive aphasia and right sided weakness.          She and her mother are very happy with her recovery thus far. They have no new complaints. She is planning to get botox soon to the right arm and leg for spasticity treatment.           She is aspirin, otherwise is not taking blood thinning agents.              Review of Histories and Systems       I reviewed the patient's past medical history, social history, family history, and review of systems. The patient recorded this information in our chart and I reviewed it personally.              Physical Exam       The patient is well-appearing, pleasant, and in no acute distress. The patient is awake, alert, with mild receptive aphasia - she follows msot commands appropriately, she seems to understand my commands and my comments; she has profound expressive aphasia - she expresses some mumbled non-sensical words at times, and she seems to be unaware that the words are inappropriate. Pupils are equal, round, and reactive to light. Extraocular muscles are intact. Visual fields are grossly full. Facial expression and sensation are symmetric. Strength is 5 out of 5 on the left; 0/5 on the right, with increased tone on RUE and RLE.           left hemicrani scar - well healed. scalp is full, soft, depressible, even with the rest of her scalp, not sunken but also not expanding.                      Review of Testing       Review of Testing: no new images          Assessment       Impression: 43y F s/p left hemicraniectomy for large left MCA ischemic stroke in 10/2023          residual expressive aphasia, right sided weakness. otherwise living with family, in good spirits          wound is well healed. ready for cranioplasty. brain is full, may need intraop temporary

## 2024-11-22 NOTE — OP NOTE
surgery were discussed at length with the  patient's family who understand and agree to proceed.  I certify that I was present throughout the entire procedure.    Details of Procedure: The patient was brought to the operating room, intubated, and general anesthesia was begun.  The patient was positioned supine, with a large gel bump under the left shoulder, and the head placed in a Mann horse-shoe head dunham. The hair was clipped and the scalp was cleaned with alcohol soaked gauze. The incision was planned. Imaging was reviewed and appropriate site was confirmed. Time-out was done.     The skin was infiltrated with 1% lidocaine. The scalp was prepped with chloraprep. The head was draped with sterile towels, Ioban, and a standard drape. The scalp was incised with a #10 blade, reopening the original incision. The opening was extended to the bone edges. The scalp was dissected off the dura and gradually reflected anteriorly. The duraplasty material had incorporated well with the native dura and provided a clean epidural plane. The scalp and residual temporalis muscle were reflected anteriorly and retracted with fish  hooks. The soft tissue was dissected off the bone edges around the entire perimeter of the skull defect. As expected from preoperative imaging, the brain was full. I took the skull flap (which had been swabbed for culture, per protocol, and then soaked in ancef-saline) and placed it on the brain. The brain was clearly too full to allow the bone to be secured at this point.    I prepared to place an temporary EVD to decompress the brain with CSF removal. I made a small opening in the dura/duraplasty material over the frontal lobe. I found a region about 1 cm superior to Kavita's point. I used bipolar electrocautery on a small region of cortex and then opened the juan with an 11 blade. I used an ultrasound probe on the brain and visualized the lateral ventricle. Under ultrasound guidance, I passed an EVD

## 2024-11-22 NOTE — CARE COORDINATION
Case Management Assessment  Initial Evaluation    Date/Time of Evaluation: 11/22/2024 3:39 PM  Assessment Completed by: EREN ZEPEDA    If patient is discharged prior to next notation, then this note serves as note for discharge by case management.    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 Yes    Chart Reviewed: Yes      History Provided by: Child/Family  Patient Orientation: Unable to Assess    Patient Cognition: Other (see comment) (yajaira)    Hospitalization in the last 30 days (Readmission):  No    If yes, Readmission Assessment in CM Navigator will be completed.    Advance Directives:      Code Status: Full Code   Patient's Primary Decision Maker is: Legal Next of Kin    Primary Decision Maker: Liliana Carroll - Parent - [phone]    Discharge Planning:    Patient lives with: Parent, Family Members (mother and sister) Type of Home: House  Primary Care Giver: Family  Patient Support Systems include: Parent, Family Members   Current Financial resources: Medicaid  Current community resources: None  Current services prior to admission: Durable Medical Equipment            Current DME: Walker            Type of Home Care services:  None    ADLS  Prior functional level: Assistance with the following:, Mobility, Housework, Cooking  Current functional level: Assistance with the following:, Other (see comment) (tbd)    PT AM-PAC:   /24  OT AM-PAC:   /24    Family can provide assistance at DC: Other (comment) (tbd)  Would you like Case Management to discuss the discharge plan with any other family members/significant others,

## 2024-11-22 NOTE — BRIEF OP NOTE
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 Item Serial No.  Lot No. LRB No. Used Action   CRANIAL BONE FLAP   [id number]   Left 1 Implanted         Drains:   Closed/Suction Drain Left Scalp Bulb (Active)       Gastrostomy/Enterostomy/Jejunostomy Tube Percutaneous Endoscopic Gastrostomy (PEG) LUQ 1 20 fr (Active)       Urinary Catheter 11/22/24 [place]rell[place]-Temperature (Active)       Findings:  Infection Present At Time Of Surgery (PATOS) (choose all levels that have infection present):  No infection present  Other Findings:   Left cranioplasty with autologous bone. 1 subgaleal GT drain. Skin closed with staples.    Electronically signed by Dane Ramírez MD on 11/22/2024 at 11:53 AM

## 2024-11-22 NOTE — PLAN OF CARE
Problem: Discharge Planning  Goal: Discharge to home or other facility with appropriate resources  Outcome: Progressing     Problem: Safety - Adult  Goal: Free from fall injury  Outcome: Progressing  Flowsheets (Taken 11/22/2024 1643)  Free From Fall Injury: Instruct family/caregiver on patient safety     Problem: Skin/Tissue Integrity  Goal: Absence of new skin breakdown  Description: 1.  Monitor for areas of redness and/or skin breakdown  2.  Assess vascular access sites hourly  3.  Every 4-6 hours minimum:  Change oxygen saturation probe site  4.  Every 4-6 hours:  If on nasal continuous positive airway pressure, respiratory therapy assess nares and determine need for appliance change or resting period.  Outcome: Progressing     Problem: Pain  Goal: Verbalizes/displays adequate comfort level or baseline comfort level  Outcome: Progressing  Flowsheets (Taken 11/22/2024 1550)  Verbalizes/displays adequate comfort level or baseline comfort level:   Encourage patient to monitor pain and request assistance   Assess pain using appropriate pain scale   Administer analgesics based on type and severity of pain and evaluate response   Implement non-pharmacological measures as appropriate and evaluate response   Consider cultural and social influences on pain and pain management     Problem: Chronic Conditions and Co-morbidities  Goal: Patient's chronic conditions and co-morbidity symptoms are monitored and maintained or improved  Outcome: Progressing     Problem: Neurosensory - Adult  Goal: Achieves stable or improved neurological status  Outcome: Progressing  Goal: Absence of seizures  Outcome: Progressing  Goal: Remains free of injury related to seizures activity  Outcome: Progressing  Goal: Achieves maximal functionality and self care  Outcome: Progressing

## 2024-11-23 LAB
ANION GAP SERPL CALCULATED.3IONS-SCNC: 15 MMOL/L (ref 3–16)
BUN SERPL-MCNC: 13 MG/DL (ref 7–20)
CALCIUM SERPL-MCNC: 9.1 MG/DL (ref 8.3–10.6)
CHLORIDE SERPL-SCNC: 107 MMOL/L (ref 99–110)
CO2 SERPL-SCNC: 23 MMOL/L (ref 21–32)
CREAT SERPL-MCNC: 0.7 MG/DL (ref 0.6–1.1)
DEPRECATED RDW RBC AUTO: 32.7 % (ref 12.4–15.4)
EKG ATRIAL RATE: 53 BPM
EKG DIAGNOSIS: NORMAL
EKG P AXIS: -1 DEGREES
EKG P-R INTERVAL: 196 MS
EKG Q-T INTERVAL: 526 MS
EKG QRS DURATION: 100 MS
EKG QTC CALCULATION (BAZETT): 493 MS
EKG R AXIS: 62 DEGREES
EKG T AXIS: 79 DEGREES
EKG VENTRICULAR RATE: 53 BPM
GFR SERPLBLD CREATININE-BSD FMLA CKD-EPI: >90 ML/MIN/{1.73_M2}
GLUCOSE SERPL-MCNC: 121 MG/DL (ref 70–99)
HCT VFR BLD AUTO: 34.8 % (ref 36–48)
HGB BLD-MCNC: 10.9 G/DL (ref 12–16)
LOEFFLER MB STN SPEC: NORMAL
MCH RBC QN AUTO: 26.1 PG (ref 26–34)
MCHC RBC AUTO-ENTMCNC: 31.5 G/DL (ref 31–36)
MCV RBC AUTO: 82.8 FL (ref 80–100)
PLATELET # BLD AUTO: 328 K/UL (ref 135–450)
PMV BLD AUTO: 9.3 FL (ref 5–10.5)
POTASSIUM SERPL-SCNC: 3.6 MMOL/L (ref 3.5–5.1)
RBC # BLD AUTO: 4.2 M/UL (ref 4–5.2)
SODIUM SERPL-SCNC: 145 MMOL/L (ref 136–145)
WBC # BLD AUTO: 14.4 K/UL (ref 4–11)

## 2024-11-23 PROCEDURE — 93010 ELECTROCARDIOGRAM REPORT: CPT | Performed by: INTERNAL MEDICINE

## 2024-11-23 PROCEDURE — 80048 BASIC METABOLIC PNL TOTAL CA: CPT

## 2024-11-23 PROCEDURE — 97530 THERAPEUTIC ACTIVITIES: CPT

## 2024-11-23 PROCEDURE — 97112 NEUROMUSCULAR REEDUCATION: CPT

## 2024-11-23 PROCEDURE — 97163 PT EVAL HIGH COMPLEX 45 MIN: CPT

## 2024-11-23 PROCEDURE — 2580000003 HC RX 258: Performed by: STUDENT IN AN ORGANIZED HEALTH CARE EDUCATION/TRAINING PROGRAM

## 2024-11-23 PROCEDURE — 6360000002 HC RX W HCPCS: Performed by: STUDENT IN AN ORGANIZED HEALTH CARE EDUCATION/TRAINING PROGRAM

## 2024-11-23 PROCEDURE — 85027 COMPLETE CBC AUTOMATED: CPT

## 2024-11-23 PROCEDURE — 97166 OT EVAL MOD COMPLEX 45 MIN: CPT

## 2024-11-23 PROCEDURE — 36415 COLL VENOUS BLD VENIPUNCTURE: CPT

## 2024-11-23 PROCEDURE — 99291 CRITICAL CARE FIRST HOUR: CPT | Performed by: NURSE PRACTITIONER

## 2024-11-23 PROCEDURE — 97116 GAIT TRAINING THERAPY: CPT

## 2024-11-23 PROCEDURE — 6370000000 HC RX 637 (ALT 250 FOR IP): Performed by: STUDENT IN AN ORGANIZED HEALTH CARE EDUCATION/TRAINING PROGRAM

## 2024-11-23 PROCEDURE — 97535 SELF CARE MNGMENT TRAINING: CPT

## 2024-11-23 PROCEDURE — 1200000000 HC SEMI PRIVATE

## 2024-11-23 PROCEDURE — 6360000002 HC RX W HCPCS: Performed by: NURSE PRACTITIONER

## 2024-11-23 RX ORDER — NICOTINE 21 MG/24HR
1 PATCH, TRANSDERMAL 24 HOURS TRANSDERMAL DAILY
Status: DISCONTINUED | OUTPATIENT
Start: 2024-11-23 | End: 2024-11-23

## 2024-11-23 RX ADMIN — SENNOSIDES AND DOCUSATE SODIUM 1 TABLET: 50; 8.6 TABLET ORAL at 08:34

## 2024-11-23 RX ADMIN — SODIUM CHLORIDE, PRESERVATIVE FREE 10 ML: 5 INJECTION INTRAVENOUS at 08:43

## 2024-11-23 RX ADMIN — CEFAZOLIN 2000 MG: 2 INJECTION, POWDER, FOR SOLUTION INTRAVENOUS at 00:06

## 2024-11-23 RX ADMIN — POLYETHYLENE GLYCOL 3350 17 G: 17 POWDER, FOR SOLUTION ORAL at 08:34

## 2024-11-23 RX ADMIN — FAMOTIDINE 20 MG: 20 TABLET, FILM COATED ORAL at 08:34

## 2024-11-23 RX ADMIN — FAMOTIDINE 20 MG: 20 TABLET, FILM COATED ORAL at 20:59

## 2024-11-23 RX ADMIN — ATORVASTATIN CALCIUM 80 MG: 80 TABLET, FILM COATED ORAL at 20:59

## 2024-11-23 RX ADMIN — AMLODIPINE BESYLATE 5 MG: 5 TABLET ORAL at 08:34

## 2024-11-23 RX ADMIN — OXYCODONE 10 MG: 5 TABLET ORAL at 18:04

## 2024-11-23 RX ADMIN — CEFAZOLIN 2000 MG: 2 INJECTION, POWDER, FOR SOLUTION INTRAVENOUS at 08:35

## 2024-11-23 RX ADMIN — PROCHLORPERAZINE EDISYLATE 10 MG: 5 INJECTION INTRAMUSCULAR; INTRAVENOUS at 00:06

## 2024-11-23 RX ADMIN — ENOXAPARIN SODIUM 30 MG: 100 INJECTION SUBCUTANEOUS at 20:59

## 2024-11-23 RX ADMIN — SENNOSIDES AND DOCUSATE SODIUM 1 TABLET: 50; 8.6 TABLET ORAL at 20:59

## 2024-11-23 RX ADMIN — SODIUM CHLORIDE, PRESERVATIVE FREE 10 ML: 5 INJECTION INTRAVENOUS at 20:59

## 2024-11-23 ASSESSMENT — PAIN DESCRIPTION - ORIENTATION: ORIENTATION: RIGHT

## 2024-11-23 ASSESSMENT — PAIN DESCRIPTION - LOCATION: LOCATION: HEAD

## 2024-11-23 ASSESSMENT — PAIN DESCRIPTION - DESCRIPTORS: DESCRIPTORS: ACHING;DULL

## 2024-11-23 ASSESSMENT — PAIN SCALES - GENERAL
PAINLEVEL_OUTOF10: 8
PAINLEVEL_OUTOF10: 0

## 2024-11-23 NOTE — PLAN OF CARE
Resting in bed with eyes closed with no apparent s/s distress. Last episode of emesis around 1800.    General: Alert, no distress, well-nourished  Neurologic  Mental status:   Eyes open to gentle name calling, attention somewhat poor, pt still drowsy but able to participate in exam with repeated verbal cues. She made no attempts to speak during my exam, mild receptive aphasia present    Cranial nerves:   CN2: Blinks to threat bilaterally  CN 3,4,6: Pupils equal and reactive to light, extraocular muscles intact  CN5: Facial sensation symmetric   CN7: Mild right facial weakness  CN8: Hearing symmetric to spoken voice  CN9: Palate elevated symmetrically  CN11: Traps full strength on shoulder shrug  CN12: Tongue midline with protrusion    Motor Exam:  RUE 0/5, subtle withdraw to pain  RLE 0/5, subtle withdraw to pain  LUE- 4/5 will squeeze hand, push/pull  LLE - would not lift leg to command, however, she did lift it off of the bed when trying to reposition her self, wiggled toes to command  Cerebellar/coordination: MEREDITH  Tone: Increased tone on the R  Gait: deferred for safety  Drains: GT drain currently to thumb print suction with serosanguinous output    Samreen Dunbar, APRN - CNP   Neurology & Neurocritical Care   Neurology Line: [phone]  [employer]: [facility] NPs

## 2024-11-23 NOTE — RT PROTOCOL NOTE
RT Inhaler-Nebulizer Bronchodilator Protocol Note    There is a bronchodilator order in the chart from a provider indicating to follow the RT Bronchodilator Protocol and there is an “Initiate RT Inhaler-Nebulizer Bronchodilator Protocol” order as well (see protocol at bottom of note).    CXR Findings:  No results found.    The findings from the last RT Protocol Assessment were as follows:   History Pulmonary Disease:  (unable to answer)  Respiratory Pattern: Regular pattern and RR 12-20 bpm  Breath Sounds: Slightly diminished and/or crackles  Cough: Strong, spontaneous, non-productive  Indication for Bronchodilator Therapy: Decreased or absent breath sounds  Bronchodilator Assessment Score:      Aerosolized bronchodilator medication orders have been revised according to the RT Inhaler-Nebulizer Bronchodilator Protocol below.    Respiratory Therapist to perform RT Therapy Protocol Assessment initially then follow the protocol.  Repeat RT Therapy Protocol Assessment PRN for score 0-3 or on second treatment, BID, and PRN for scores above 3.    No Indications - adjust the frequency to every 6 hours PRN wheezing or bronchospasm, if no treatments needed after 48 hours then discontinue using Per Protocol order mode.     If indication present, adjust the RT bronchodilator orders based on the Bronchodilator Assessment Score as indicated below.  Use Inhaler orders unless patient has one or more of the following: on home nebulizer, not able to hold breath for 10 seconds, is not alert and oriented, cannot activate and use MDI correctly, or respiratory rate 25 breaths per minute or more, then use the equivalent nebulizer order(s) with same Frequency and PRN reasons based on the score.  If a patient is on this medication at home then do not decrease Frequency below that used at home.    0-3 - enter or revise RT bronchodilator order(s) to equivalent RT Bronchodilator order with Frequency of every 4 hours PRN for wheezing or

## 2024-11-23 NOTE — PLAN OF CARE
Problem: Discharge Planning  Goal: Discharge to home or other facility with appropriate resources  11/23/2024 1311 by Makayla Chappell RN  Outcome: Progressing  Flowsheets (Taken 11/23/2024 0800 by Alyssa Galvan)  Discharge to home or other facility with appropriate resources:   Identify barriers to discharge with patient and caregiver   Arrange for needed discharge resources and transportation as appropriate   Identify discharge learning needs (meds, wound care, etc)   Arrange for interpreters to assist at discharge as needed     Problem: Safety - Adult  Goal: Free from fall injury  11/23/2024 1311 by Makayla Chappell, RN  Outcome: Progressing     Problem: Skin/Tissue Integrity  Goal: Absence of new skin breakdown  Description: 1.  Monitor for areas of redness and/or skin breakdown  2.  Assess vascular access sites hourly  3.  Every 4-6 hours minimum:  Change oxygen saturation probe site  4.  Every 4-6 hours:  If on nasal continuous positive airway pressure, respiratory therapy assess nares and determine need for appliance change or resting period.  11/23/2024 1311 by Makayla Chappell RN  Outcome: Progressing     Problem: Pain  Goal: Verbalizes/displays adequate comfort level or baseline comfort level  11/23/2024 1311 by Makayla Chappell RN  Outcome: Progressing  Flowsheets (Taken 11/23/2024 0800 by Alyssa Galvan)  Verbalizes/displays adequate comfort level or baseline comfort level:   Encourage patient to monitor pain and request assistance   Assess pain using appropriate pain scale   Administer analgesics based on type and severity of pain and evaluate response   Implement non-pharmacological measures as appropriate and evaluate response   Consider cultural and social influences on pain and pain management     Problem: Chronic Conditions and Co-morbidities  Goal: Patient's chronic conditions and co-morbidity symptoms are monitored and maintained or improved  11/23/2024 1311 by Makayla Chappell RN  Outcome:

## 2024-11-23 NOTE — PLAN OF CARE
Problem: Discharge Planning  Goal: Discharge to home or other facility with appropriate resources  11/23/2024 0131 by Corinne Hill RN  Outcome: Progressing  11/22/2024 1649 by Luann Freeman RN  Outcome: Progressing  Flowsheets (Taken 11/22/2024 1400)  Discharge to home or other facility with appropriate resources: Identify barriers to discharge with patient and caregiver     Problem: Safety - Adult  Goal: Free from fall injury  11/23/2024 0131 by Corinne Hill RN  Outcome: Progressing  11/22/2024 1649 by Luann Freeman RN  Outcome: Progressing  Flowsheets (Taken 11/22/2024 1643)  Free From Fall Injury: Instruct family/caregiver on patient safety     Problem: Skin/Tissue Integrity  Goal: Absence of new skin breakdown  Description: 1.  Monitor for areas of redness and/or skin breakdown  2.  Assess vascular access sites hourly  3.  Every 4-6 hours minimum:  Change oxygen saturation probe site  4.  Every 4-6 hours:  If on nasal continuous positive airway pressure, respiratory therapy assess nares and determine need for appliance change or resting period.  11/23/2024 0131 by Corinne Hill RN  Outcome: Progressing  11/22/2024 1649 by Luann Freeman RN  Outcome: Progressing     Problem: Pain  Goal: Verbalizes/displays adequate comfort level or baseline comfort level  11/23/2024 0131 by Corinne Hill RN  Outcome: Progressing  11/22/2024 1649 by Luann Freeman RN  Outcome: Progressing  Flowsheets (Taken 11/22/2024 1550)  Verbalizes/displays adequate comfort level or baseline comfort level:   Encourage patient to monitor pain and request assistance   Assess pain using appropriate pain scale   Administer analgesics based on type and severity of pain and evaluate response   Implement non-pharmacological measures as appropriate and evaluate response   Consider cultural and social influences on pain and pain management     Problem: Chronic Conditions and Co-morbidities  Goal: Patient's chronic

## 2024-11-24 VITALS
SYSTOLIC BLOOD PRESSURE: 117 MMHG | DIASTOLIC BLOOD PRESSURE: 88 MMHG | RESPIRATION RATE: 26 BRPM | HEIGHT: 72 IN | HEART RATE: 91 BPM | OXYGEN SATURATION: 97 % | WEIGHT: 253.09 LBS | BODY MASS INDEX: 34.28 KG/M2 | TEMPERATURE: 98.2 F

## 2024-11-24 PROBLEM — M95.2 ACQUIRED DEFORMITY OF SKULL: Status: ACTIVE | Noted: 2024-11-24

## 2024-11-24 PROBLEM — M95.2 ACQUIRED DEFORMITY OF SKULL: Status: RESOLVED | Noted: 2024-11-24 | Resolved: 2024-11-24

## 2024-11-24 PROBLEM — M95.2 ACQUIRED SKULL DEFECT: Status: RESOLVED | Noted: 2024-11-22 | Resolved: 2024-11-24

## 2024-11-24 LAB
ANION GAP SERPL CALCULATED.3IONS-SCNC: 11 MMOL/L (ref 3–16)
BASOPHILS # BLD: 0 K/UL (ref 0–0.2)
BASOPHILS NFR BLD: 0.2 %
BUN SERPL-MCNC: 15 MG/DL (ref 7–20)
CALCIUM SERPL-MCNC: 8.6 MG/DL (ref 8.3–10.6)
CHLORIDE SERPL-SCNC: 105 MMOL/L (ref 99–110)
CO2 SERPL-SCNC: 27 MMOL/L (ref 21–32)
CREAT SERPL-MCNC: 0.7 MG/DL (ref 0.6–1.1)
DEPRECATED RDW RBC AUTO: 32.1 % (ref 12.4–15.4)
EOSINOPHIL # BLD: 0 K/UL (ref 0–0.6)
EOSINOPHIL NFR BLD: 0.2 %
GFR SERPLBLD CREATININE-BSD FMLA CKD-EPI: >90 ML/MIN/{1.73_M2}
GLUCOSE SERPL-MCNC: 111 MG/DL (ref 70–99)
HCT VFR BLD AUTO: 33.9 % (ref 36–48)
HGB BLD-MCNC: 10.6 G/DL (ref 12–16)
LYMPHOCYTES # BLD: 3.2 K/UL (ref 1–5.1)
LYMPHOCYTES NFR BLD: 24.7 %
MCH RBC QN AUTO: 26.1 PG (ref 26–34)
MCHC RBC AUTO-ENTMCNC: 31.3 G/DL (ref 31–36)
MCV RBC AUTO: 83.3 FL (ref 80–100)
MONOCYTES # BLD: 1.8 K/UL (ref 0–1.3)
MONOCYTES NFR BLD: 13.5 %
NEUTROPHILS # BLD: 8 K/UL (ref 1.7–7.7)
NEUTROPHILS NFR BLD: 61.4 %
PLATELET # BLD AUTO: 309 K/UL (ref 135–450)
PMV BLD AUTO: 7.9 FL (ref 5–10.5)
POTASSIUM SERPL-SCNC: 3.6 MMOL/L (ref 3.5–5.1)
RBC # BLD AUTO: 4.07 M/UL (ref 4–5.2)
SODIUM SERPL-SCNC: 143 MMOL/L (ref 136–145)
WBC # BLD AUTO: 13.1 K/UL (ref 4–11)

## 2024-11-24 PROCEDURE — 80048 BASIC METABOLIC PNL TOTAL CA: CPT

## 2024-11-24 PROCEDURE — 85025 COMPLETE CBC W/AUTO DIFF WBC: CPT

## 2024-11-24 PROCEDURE — 2580000003 HC RX 258: Performed by: STUDENT IN AN ORGANIZED HEALTH CARE EDUCATION/TRAINING PROGRAM

## 2024-11-24 PROCEDURE — 99232 SBSQ HOSP IP/OBS MODERATE 35: CPT

## 2024-11-24 PROCEDURE — 97535 SELF CARE MNGMENT TRAINING: CPT

## 2024-11-24 PROCEDURE — 6360000002 HC RX W HCPCS: Performed by: NURSE PRACTITIONER

## 2024-11-24 PROCEDURE — 36415 COLL VENOUS BLD VENIPUNCTURE: CPT

## 2024-11-24 PROCEDURE — 97116 GAIT TRAINING THERAPY: CPT

## 2024-11-24 PROCEDURE — 97530 THERAPEUTIC ACTIVITIES: CPT

## 2024-11-24 PROCEDURE — 6360000002 HC RX W HCPCS: Performed by: STUDENT IN AN ORGANIZED HEALTH CARE EDUCATION/TRAINING PROGRAM

## 2024-11-24 PROCEDURE — 6370000000 HC RX 637 (ALT 250 FOR IP): Performed by: STUDENT IN AN ORGANIZED HEALTH CARE EDUCATION/TRAINING PROGRAM

## 2024-11-24 RX ORDER — OXYCODONE HYDROCHLORIDE 5 MG/1
5 TABLET ORAL EVERY 6 HOURS PRN
Qty: 9 TABLET | Refills: 0 | Status: SHIPPED | OUTPATIENT
Start: 2024-11-24 | End: 2024-11-27

## 2024-11-24 RX ADMIN — SENNOSIDES AND DOCUSATE SODIUM 1 TABLET: 50; 8.6 TABLET ORAL at 09:33

## 2024-11-24 RX ADMIN — FAMOTIDINE 20 MG: 20 TABLET, FILM COATED ORAL at 09:33

## 2024-11-24 RX ADMIN — PROCHLORPERAZINE EDISYLATE 10 MG: 5 INJECTION INTRAMUSCULAR; INTRAVENOUS at 01:54

## 2024-11-24 RX ADMIN — OXYCODONE 10 MG: 5 TABLET ORAL at 00:18

## 2024-11-24 RX ADMIN — SODIUM CHLORIDE, PRESERVATIVE FREE 10 ML: 5 INJECTION INTRAVENOUS at 09:37

## 2024-11-24 RX ADMIN — ENOXAPARIN SODIUM 30 MG: 100 INJECTION SUBCUTANEOUS at 09:32

## 2024-11-24 RX ADMIN — POLYETHYLENE GLYCOL 3350 17 G: 17 POWDER, FOR SOLUTION ORAL at 09:32

## 2024-11-24 RX ADMIN — AMLODIPINE BESYLATE 5 MG: 5 TABLET ORAL at 09:32

## 2024-11-24 ASSESSMENT — PAIN DESCRIPTION - ORIENTATION: ORIENTATION: LEFT

## 2024-11-24 ASSESSMENT — PAIN DESCRIPTION - LOCATION: LOCATION: HEAD

## 2024-11-24 ASSESSMENT — PAIN SCALES - GENERAL
PAINLEVEL_OUTOF10: 7
PAINLEVEL_OUTOF10: 0

## 2024-11-24 ASSESSMENT — PAIN DESCRIPTION - DESCRIPTORS: DESCRIPTORS: ACHING

## 2024-11-24 NOTE — CARE COORDINATION
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]   Date / Time: 11/22/2024  6:24 AM    Current PCP: Chey Parisi APRN - CNP    Advance Directives:  Code Status: Full Code    Financial:  Payor: [employer] / Plan: [facility] [place] [facility] / Product Type: *No Product type* /      Pharmacy:    [id number] [address] - P [phone] - F [phone] [address] [facility] [address]  Phone: [phone] Fax: [phone]    [facility] [id number] [facility] [address] - P [phone] - F [phone]  [address] [facility] [address]  Phone: [phone] Fax: [phone]    ADLS:  Current PT AM-PAC Score: 15 /24  Current OT AM-PAC Score: 18 /24    DISCHARGE Disposition: Pt will return home with her mother as primary care giver. She has OP PT services at [facility]    Transportation:  Transportation PLAN for discharge: EMS transportation   Mode of Transport: Ambulance stretcher - [facility]  Name of Transport Company: [facility] [facility]  Phone: [phone]  Time of Transport: 2p    Transport form completed: Yes    Additional CM Notes:  Met with the pt and her mother at the bedside to confirm plans for discharge. Explained therapy recommended inpatient rehab. Melody's mother stated she will take her home and continue to provide care. Pt has outpatient therapies arranged at [facility]. Offered stretcher transport home. Pt and mother in agreement.    COVID Result:  No results found for: \"COVID19\"    The Plan for Transition of Care is related to the following treatment goals of Acquired deformity of skull

## 2024-11-24 NOTE — PROGRESS NOTES
NEUROCRITICAL CARE PROGRESS NOTE       Patient Name: Melody Carroll YOB: 1980   Sex: Female Age: 44 yrs     CC / Reason for Consult: post op management     Changes over last 24 hours:   Nausea and vomiting, improved once placed on scopolamine last emesis around 1800      ROS: + right sided weakness, + expressive aphasia      History of Present Illness   Melody Carroll is a 44 y.o. F with PMH of HTN, HLD, asthma, L MCA stroke with left [id number]/23, who is here for elective cranioplasty. Patient lives at home with her mother. Per family, patient has been improving. She is able to say her name, simple sentence but still has a significant expressive aphasia. Able to eat.     Initial Post Op CT shows small  extra-axial hemorrhage measuring 0.9 cm overlying the temporal convexity. Small amount of hemorrhage within the left lateral ventricle. Mild pneumocephalus. 6 hr CT scan was stable.       Past Medical, Surgical, Family, and Social History   PAST MEDICAL HISTORY:  Past Medical History:   Diagnosis Date    Cerebral artery occlusion with cerebral infarction (HCC)     Difficulty speaking     Hyperlipidemia     Hypertension     Numbness on right side     Post Left Sided Stroke     SURGICAL HISTORY:  Past Surgical History:   Procedure Laterality Date    APPENDECTOMY      CRANIOPLASTY Left 11/22/2024    LEFT CRANIOPLASTY performed by Dane Ramírez MD at [facility] OR    CRANIOTOMY Left 10/21/2023    LEFT DECOMPRESSIVE FLORA-CRANIECTOMY performed by Dane Ramírez MD at [facility] OR    ENDOSCOPY, COLON, DIAGNOSTIC      GASTROSTOMY TUBE PLACEMENT N/A 11/01/2023    Esophagogastroduodenoscopy with Percutaneous endoscopic gastrostomy tube placement performed by Zurdo Parrish MD at [facility] ENDOSCOPY    PEG TUBE REMOVAL  02/2024     FAMILY HISTORY & SOCIAL HISTORY:  Family history non-contributory  History reviewed. No pertinent family history.  Social History     Tobacco Use    Smoking 
    [facility] PRE-SURGICAL TESTING INSTRUCTIONS                      PRIOR TO PROCEDURE DATE:    1. PLEASE FOLLOW ANY INSTRUCTIONS GIVEN TO YOU PER YOUR SURGEON.      2. Arrange for someone to drive you home and be with you for the first 24 hours after discharge for your safety after your procedure for which you received sedation. Ensure it is someone we can share information with regarding your discharge.     NOTE: At this time ONLY 2 ADULTS may accompany you   One person ENCOURAGED to stay at hospital entire time if outpatient surgery      3. You must contact your surgeon for instructions IF:  You are taking any blood thinners, aspirin, anti-inflammatory or vitamins.  There is a change in your physical condition such as a cold, fever, rash, cuts, sores, or any other infection, especially near your surgical site.    4. Do not drink alcohol the day before or day of your procedure.  Do not use any recreational marijuana at least 24 hours or street drugs (heroin, cocaine) at minimum 5 days prior to your procedure.     5. A Pre-Surgical History and Physical MUST be completed WITHIN 30 DAYS OR LESS prior to your procedure.by your Physician or an Urgent Care        THE DAY OF YOUR PROCEDURE:  1.  Follow instructions for ARRIVAL TIME as DIRECTED BY YOUR SURGEON.     2. Enter the MAIN entrance from [facility] and follow the signs to the free Parking Garage or  Parking (offered free of charge 7 am-5pm).      3. Enter the Main Entrance of the hospital (do not enter from the lower level of the parking garage). Upon entrance, check in with the  at the surgical information desk on your LEFT.   Bring your insurance card and photo ID to register      4. DO NOT EAT ANYTHING 8 hours prior to arrival for surgery.  You may have up to 8 ounces of water 4 hours prior to your arrival for surgery.   NOTE: ALL Gastric, Bariatric & Bowel surgery patients - you MUST follow your surgeon's instructions regarding 
    Neurosurgery Progress Note      LOS: 1 day     Interval History:    -was slow to recover back to baseline, but throughout nice did return to her neurologic baseline  -immediate post op CT - revealed small amount of hemorrhage in the left frontal horn (likely secondary to intra-op EVD placement) and small small left subdural/epidural blood; repeat CT 6 hrs later revealed stable findings)  -patient in great spirits, happy today. Says yes regarding pain at surgical site    O:   Vitals:    11/23/24 0600 11/23/24 0700 11/23/24 0800 11/23/24 0900   BP: (!) 130/95 (!) 148/91  124/81   Pulse: 80 87  (!) 107   Resp: 16 17  14   Temp:   97.8 °F (36.6 °C)    TempSrc:   Axillary    SpO2: 100% 97%  96%   Weight:       Height:                Intake/Output Summary (Last 24 hours) at 11/23/2024 1048  Last data filed at 11/23/2024 0600  Gross per 24 hour   Intake 1648.6 ml   Output 1610 ml   Net 38.6 ml          Recent Results (from the past 24 hour(s))   EKG 12 Lead    Collection Time: 11/22/24  8:32 PM   Result Value Ref Range    Ventricular Rate 53 BPM    Atrial Rate 53 BPM    P-R Interval 196 ms    QRS Duration 100 ms    Q-T Interval 526 ms    QTc Calculation (Bazett) 493 ms    P Axis -1 degrees    R Axis 62 degrees    T Axis 79 degrees    Diagnosis       Sinus bradycardiaT wave abnormality, consider anterolateral ischemiaProlonged QTAbnormal ECG   CBC with Auto Differential    Collection Time: 11/22/24  8:45 PM   Result Value Ref Range    WBC 11.4 (H) 4.0 - 11.0 K/uL    RBC 4.48 4.00 - 5.20 M/uL    Hemoglobin 11.6 (L) 12.0 - 16.0 g/dL    Hematocrit 38.4 36.0 - 48.0 %    MCV 85.7 80.0 - 100.0 fL    MCH 26.0 26.0 - 34.0 pg    MCHC 30.3 (L) 31.0 - 36.0 g/dL    RDW 33.4 (H) 12.4 - 15.4 %    Platelets 257 135 - 450 K/uL    MPV 8.8 5.0 - 10.5 fL    Neutrophils % 89.5 %    Lymphocytes % 6.9 %    Monocytes % 3.2 %    Eosinophils % 0.0 %    Basophils % 0.4 %    Neutrophils Absolute 10.2 (H) 1.7 - 7.7 K/uL    Lymphocytes Absolute 0.8 
  Current NIHSS 19 (though patient is post-op and likely still experiencing effects of anesthesia).    Nursing Core Measures for Stroke:   [x]   Education template documentation   [x]   Care Plan template documentation   [x]   Verified Swallow Screen completed prior to PO intake of food, drink, medications NPO   [x]   VTE Prophylaxis: SCDs ordered/addressed; SCDs: On           (As a reminder, ASA, Plavix, and TPA/TNK are not VTE prophylaxis.)    Reviewed the Following Education with Patient and/or Family:   - Personalized risk factors for patient, along with changes, modifications that will help prevent stroke.  - Signs and Symptoms of Stroke: (Facial droop, weakness/numbness especially on one side, speech difficulty, sudden confusion, sudden loss of vision, sudden severe headache, sudden loss of balance or having difficulty walking, syncope, or seizure)  - How to activate EMS (911)   - Importance of Follow Up Appointments at Discharge   - Importance of Compliance with Medications Prescribed at Discharge  - Available community resources and stroke advocacy groups if needed    Patient and/or family member: education needs reinforcement.     Stroke Education booklet given to patient/family (or verified, if given already), which reviews above information. yes         Electronically signed by Luann Freeman RN on 11/22/2024 at 7:51 PM    
11/21/24 @ 8965 I spoke with Martha at surgeon office and she is faxing orders and oncology cl to endo fax  /NR  
4 Eyes Skin Assessment     NAME:  Melody Carroll  YOB: 1980  MEDICAL RECORD NUMBER:  [id number]    The patient is being assessed for  Post-Op Surgical    I agree that at least one RN has performed a thorough Head to Toe Skin Assessment on the patient. ALL assessment sites listed below have been assessed.      Areas assessed by both nurses:    Head, Face, Ears, Shoulders, Back, Chest, Arms, Elbows, Hands, Sacrum. Buttock, Coccyx, Ischium, Legs. Feet and Heels, Under Medical Devices , and Other          Does the Patient have a Wound? No noted wound(s)       Paras Prevention initiated by RN: Yes  Wound Care Orders initiated by RN: No    Pressure Injury (Stage 3,4, Unstageable, DTI, NWPT, and Complex wounds) if present, place Wound referral order by RN under : No    New Ostomies, if present place, Ostomy referral order under : No     Nurse 1 eSignature: Electronically signed by Luann Freeman RN on 11/22/24 at 3:14 PM EST    **SHARE this note so that the co-signing nurse can place an eSignature**    Nurse 2 eSignature: Electronically signed by Naehed Damon RN on 11/22/24 at 3:14 PM EST    
BONE FLAP WAS REMOVED FROM THE FREEZER BY KATIE CASTRO AND ROLAN NINO AT 8:15AM ON 11/22/2024.  NAME, DATE OF BIRTH, MRN AMD EXPIRATION DATE WERE VERIFIED BY TERESA.   THE FLAP WAS THEN CULTURED AND THEN SOAKED IN 1 GRAM OF ANCEF PRIOR TO IMPLANT.   SERIAL NUMBER: [id number]  
Bradycardia to 43 noted, not sustained. SB /84 (101).  
Called Dr Ramírez's office to tell them that the pt  and/ or her mother does not answer her phone toaask about her H&P and give instructions. Martha at the office suggests that we keep trying to contact the mother. /yovanny 11/20 @ 3162  
Dr. Ramírez at bedside.   
Dr. Ramírez at bedside. Reviewed CT and would like patient to go to ICU over night.   
Mom who is POA(next of kin) signed Anesthesia and Surgical sent along with patient due to issues with signing from previous stroke as patient's right side was her dominant side and now flaccid.   
Occupational Therapy  Facility/Department: [facility] ICU  Occupational Therapy Initial Assessment    Name: Melody Carroll  : 1980  MRN: [id number]  Date of Service: 2024    Discharge Recommendations:  Continue to assess pending progress, 24 hour supervision or assist, Subacute/Skilled Nursing Facility  OT Equipment Recommendations  Equipment Needed: No       Patient Diagnosis(es): Diagnoses of Acquired deformity of skull and Acute ischemic left MCA stroke (HCC) were pertinent to this visit.  Past Medical History:  has a past medical history of Cerebral artery occlusion with cerebral infarction (HCC), Difficulty speaking, Hyperlipidemia, Hypertension, and Numbness on right side.  Past Surgical History:  has a past surgical history that includes craniotomy (Left, 10/21/2023); Gastrostomy tube placement (N/A, 2023); PEG tube removal (2024); Endoscopy, colon, diagnostic; Appendectomy; and Cranioplasty (Left, 2024).    Treatment Diagnosis: impaired ADL and fxl mob      Assessment  Performance deficits / Impairments: Decreased functional mobility ;Decreased ADL status;Decreased cognition  Assessment: Pt is 44y F from home w/ family supports - baseline is unknown, but per chart review pt lives w/ family, and family provides A / care. Pt is POD1 L cranioplasty (craniectomy 10/2023); pt is presently ModA LB ADLs and Mickey x2 fxl mobility w/ RW/GB. Pt may benefit from skilled OT to maximize safety, IND and fxl act betsey needed for ADLs and fxl mobility tasks. Will follow as inpt per POC  Treatment Diagnosis: impaired ADL and fxl mob  Prognosis: Fair  Decision Making: Medium Complexity  REQUIRES OT FOLLOW-UP: Yes  Activity Tolerance  Activity Tolerance: Patient Tolerated treatment well     Plan  Occupational Therapy Plan  Times Per Week: 5-7  Times Per Day: Once a day  Current Treatment Recommendations: Functional mobility training, Neuromuscular re-education, Patient/Caregiver education & training, 
Occupational Therapy  Facility/Department: [facility] ICU  Occupational Therapy Tx    Name: Melody Carroll  : 1980  MRN: [id number]  Date of Service: 2024    Discharge Recommendations:  24 hour supervision or assist, Home with Home health OT  OT Equipment Recommendations  Equipment Needed: No  Other: Pt has needed DME       Patient Diagnosis(es): Diagnoses of Acquired deformity of skull and Acute ischemic left MCA stroke (HCC) were pertinent to this visit.  Past Medical History:  has a past medical history of Cerebral artery occlusion with cerebral infarction (HCC), Difficulty speaking, Hyperlipidemia, Hypertension, and Numbness on right side.  Past Surgical History:  has a past surgical history that includes craniotomy (Left, 10/21/2023); Gastrostomy tube placement (N/A, 2023); PEG tube removal (2024); Endoscopy, colon, diagnostic; Appendectomy; and Cranioplasty (Left, 2024).    Treatment Diagnosis: impaired ADL and fxl mob      Assessment  Performance deficits / Impairments: Decreased functional mobility ;Decreased ADL status;Decreased cognition;Decreased balance  Assessment: Pt agreeable to OT tx and tolerates session well. Pt continues to function below recent baseline and demos decreased safety awareness, decreased balance and decreased activity tolerance. Pt requiring assist x1-2 for functional transfers, functional mobility and ADLs. Pt's mom present who is pt's primary caregiver and reports preference for/comfort with pt DC to home with x2 always available to assist pt as needed. Pt will benefit from continued skilled OT to address above deficits and maximize independence/safety with functional activity. Recommend 24 hr assist for safety.  Treatment Diagnosis: impaired ADL and fxl mob  REQUIRES OT FOLLOW-UP: Yes  Activity Tolerance  Activity Tolerance: Patient Tolerated treatment well     Plan  Occupational Therapy Plan  Times Per Week: 5-7  Times Per Day: Once a day  Current 
Occupational Therapy  OT / PT orders rec'd and chart reviewed. Spoke w/ RN who informed OT / PT that family is coming at 11 this AM. OT / PT will re-attempt at later time when family is present, to provide social / functional hx, and PLOF. RN aware.  Jo Morton, MOT-OTR/L [id number]   
PACU RN called and updated patient's family.   
PACU RN called report to ICU RN.   
PACU Transfer to Floor Note    Procedure(s):  LEFT CRANIOPLASTY    Current Allergies: Patient has no known allergies.    Pt meets criteria as per Ashutosh Score and ASPAN Standards to transfer to next phase of care.     No results for input(s): \"POCGLU\" in the last 72 hours.    Vitals:    11/22/24 1330   BP: 122/85   Pulse: 68   Resp: 17   Temp: 97.1 °F (36.2 °C)   SpO2:      Vitals within 20% of pt's admission vitals as per ASHUTOSH SCORE    SpO2: 99 %    O2 Flow Rate (L/min): 1 L/min      Intake/Output Summary (Last 24 hours) at 11/22/2024 1343  Last data filed at 11/22/2024 1158  Gross per 24 hour   Intake 1700 ml   Output 1850 ml   Net -150 ml       Pain assessment:  present - adequately treated    Pain Level: 10    Patient was assessed for alterations to skin integrity. There were not alterations observed.    Is patient incontinent: yes, voided    Patient has all belongings at discharge from PACU.  PACU RN called and updated patient's family.     Handoff report given at bedside.   Family updated and directed to pt room 4500  **Patricia transported patient to unit.       11/22/2024 1:43 PM  
Patient admitted to ICU room 4502 from PACU. She is moaning and does not open her eyes. Right side flaccid on assessment and left side with tone, though patient is not following commands at this time. Neurosurgery and NCC aware of admit. Carolee Maldonado bedside.    Upon arrival, patient vomited a large amount of thick, dark green emesis and food. She has been NPO since 1900 last night per her mother.    
Patient again vomited a large amount of green emesis with fully intact food. This RN was bedside during all incidences and able to suction, though aspiration risk is high.    Scheduled, repeat CT stable per NCC.   
Patient again vomited a large amount of green emesis. Compazine given IV and scopolamine patch placed.  
Patient arrived to PACU post LEFT CRANIOPLASTY - Left with Dr. Ramírez. VSS on arrival. CRNA gave PACU RN report at bedside stating no complications during procedure. Dressing to surgical site clean, dry and intact. Patient shows no signs of pain at this time. Will continue to monitor.   
Physical Therapy  Facility/Department: [facility] ICU  Physical Therapy Initial Assessment and Treatment    Name: Melody Carroll  : 1980  MRN: [id number]  Date of Service: 2024    Discharge Recommendations:  IP Rehab, Patient able to tolerate 3 hours of therapy per day   PT Equipment Recommendations  Other: Possibly, pending what DME pt has at home      Patient Diagnosis(es): Diagnoses of Acquired deformity of skull and Acute ischemic left MCA stroke (HCC) were pertinent to this visit.  Past Medical History:  has a past medical history of Cerebral artery occlusion with cerebral infarction (HCC), Difficulty speaking, Hyperlipidemia, Hypertension, and Numbness on right side.  Past Surgical History:  has a past surgical history that includes craniotomy (Left, 10/21/2023); Gastrostomy tube placement (N/A, 2023); PEG tube removal (2024); Endoscopy, colon, diagnostic; Appendectomy; and Cranioplasty (Left, 2024).    Assessment  Assessment: Pt is 45 yo F admitted on 24 for cranioplasty.  Unable to obtain hx from pt due to expressive aphasia.  Pt pleasant, smiling, and appears to understand PT.  Pt able to move with assist of 2 for gait using wheeled walker and R AFO (RN stated pt uses wheeled walker at home).  Pt unable to grasp walker with R hand due to tone related hand flexion.  Pt likely will ambulate better using pyramid cane or quad cane.  Pt impulsive at times.  Rec continued inpt PT prior to return home to maximize pt's mobility and safety.  If home, rec 24 hour assist and home PT, and possible DME.  Will follow, attempting to gather subjective information from pt's family.    At baseline this patient was more indpendent with functional mobility & ADL's. The current hospitalization has resulted in the patient now being limited with all aspects of mobility, negatively impacting the patient's functional independence, ability to safely access their home environment, and ability to 
Pt's Hr briefly dropped to the high 30s. HR currently in the 50s. Stat EKG done.   Latest Reference Range & Units 11/22/24 20:32   EKG 12-LEAD  Rpt (P)   Atrial Rate BPM 53 (P)   Diagnosis  Sinus bradycardiaT wave abnormality, consider anterolateral ischemiaProlonged QTAbnormal ECG (P)   P Axis degrees -1 (P)   P-R Interval ms 196 (P)   Q-T Interval ms 526 (P)   QRS Duration ms 100 (P)   QTc Calculation (Bazett) ms 493 (P)   R Axis degrees 62 (P)   T Axis degrees 79 (P)   Ventricular Rate BPM 53 (P)   (P): Preliminary  Rpt: View report in Results Review for more information     NCC NP made aware. Labs ordered.   
reach  Restraints  Restraints Initially in Place: No    Restrictions  Position Activity Restriction  Other position/activity restrictions: ambulate pt; up in chair; up as tolerated; full code; adult diet reg     Subjective  Pain: no pain reported  General  Chart Reviewed: Yes  Additional Pertinent Hx: Pt is 45 yo F admitted on 11/22/24 with acquired skull defect.  Pt underwent LEFT CRANIOPLASTY.  H/o largest ischemic stroke in 10/2023 and underwent a left hemicraniotomy with residual persistent expressive aphasia and right-sided weakness, hypertension, and hyperlipidemia who presents with acquired skull defect.  Family / Caregiver Present: Yes (mother,Liliana)  Subjective  Subjective: Pt found supine.  Agreeable to PT.  Expressive aphasia but able to shake/nod head and answer some question.  Reports some discomfort in head - not rate.  RN aware         Social/Functional History  Social/Functional History  Lives With: Parent, Family (multiple family members in the home)  Type of Home: House  Home Layout: Multi-level, Performs ADL's on one level, Able to Live on Main level with bedroom/bathroom  Home Access: Stairs to enter with rails  Entrance Stairs - Number of Steps: 5  Entrance Stairs - Rails: Both  Bathroom Shower/Tub: Walk-in shower  Bathroom Toilet: Standard  Bathroom Equipment: Shower chair, Hand-held shower, Toilet raiser  Home Equipment: Hospital bed, Walker - Rolling, Wheelchair - Manual (kip -walker (never used), mechanical lift - not currently being used)  ADL Assistance:  (assist with bathing, intermittent assist for dressing/toileting)  Homemaking Assistance:  (does not complete)  Ambulation Assistance:  (utilizes RW vs bar around house, family is always standing by when pt OOB)  Active : No  Patient's  Info: sister  Additional Comments: Pts mom present providing above information. Pts mom reports pt was completing PT/OT/SLP outpt 2-3x/week until about a month ago.  Someone is with pt

## 2024-11-24 NOTE — CONSULTS
V2.0    [facility] Progress Note      Name:  Melody Carroll /Age/Sex: 1980  (44 y.o. female)   MRN & CSN:  [id number] & [id number] Encounter Date/Time: 2024 6:36 AM EST   Location:  [facility]- PCP: Chey Parisi APRN - CNP     Attending:Dane Ramírez MD       Hospital Day: 3    Assessment and Recommendations       Assessment/Plan:     Hypertension:  Currently on home medication amlodipine 5 mg daily  BP doing okay    Hyperlipidemia:  Patient takes atorvastatin 80 mg daily from home continue at this time    Previous ischemic stroke:  Aspirin 81 mg daily from home continue    Status post left cranioplasty:  Management per primary team  Patient's in good spirits      MDM       MDM  [] High (any 2 of A, B, or C)    A. Problems (any 1)  [] Acute/Chronic Illness/injury posing threat to life or bodily function:    [] Severe exacerbation of chronic illness:    ---------------------------------------------------------------------  B. Risk of Treatment (any 1)   [] IV ABX requiring serial renal monitoring for nephrotoxicity:     [] IV Narcotic analgesia for adverse drug reaction  [] IV diuresis requiring serial monitoring for renal impairment and electrolyte derangements  [] Critical electrolyte abnormalities requiring IV replacement and close serial monitoring  [] Insulin - monitoring serial FSBS for Hypoglycemic adverse drug reaction  [] Anticoagulation requiring serial monitoring of coagulation factors  [] IV/IM Controlled Substances order (any 1)   [] One-time Order including Drug Name/Route, Reason Ordered:   [] Scheduled Order including Drug Name/Route, Reason Ordered or Continued:   [] PRN Order including Drug Name/Route, Reason Ordered or Continued:  Other -   [] Decision to De-escalate Care this DOS due to change in treatment goals:  [] Decision to Escalate Care To:   [] Major Surgery/Procedure (any 1):   Elective with patient risk factors including Procedure Type and Risk Factors:   Emergent 
24 hour   Intake 1700 ml   Output 1910 ml   Net -210 ml      Vitals:   Vitals:    11/22/24 1300 11/22/24 1330 11/22/24 1400 11/22/24 1500   BP: 119/79 122/85 (!) 142/92 (!) 148/95   Pulse: 59 68 61 56   Resp: 16 17  13   Temp:  97.1 °F (36.2 °C)     TempSrc:       SpO2: 99%      Weight:       Height:             Physical Exam:      General: NAD  Female laying in bed lethargic but easily awakes  Eyes: EOMI  ENT: neck supple  Cardiovascular: Regular rate.  Respiratory: Clear to auscultation  Gastrointestinal: Soft, non tender  Genitourinary: no suprapubic tenderness  Musculoskeletal: No edema  Skin: warm, dry  Patient is lethargic but easily awakes      Past Medical History:   PMHx   Past Medical History:   Diagnosis Date    Cerebral artery occlusion with cerebral infarction (HCC)     Difficulty speaking     Hyperlipidemia     Hypertension     Numbness on right side     Post Left Sided Stroke     PSHX:  has a past surgical history that includes craniotomy (Left, 10/21/2023); Gastrostomy tube placement (N/A, 11/01/2023); PEG tube removal (02/2024); Endoscopy, colon, diagnostic; Appendectomy; and Cranioplasty (Left, 11/22/2024).  Allergies: No Known Allergies  Fam HX:  family history is not on file.  Soc HX:   Social History     Socioeconomic History    Marital status: Single     Spouse name: None    Number of children: None    Years of education: None    Highest education level: None   Occupational History    Occupation: Head    Tobacco Use    Smoking status: Every Day     Current packs/day: 1.00     Average packs/day: 1 pack/day for 32.2 years (32.2 ttl pk-yrs)     Types: Cigarettes     Start date: 9/15/1992    Smokeless tobacco: Never    Tobacco comments:     9/20/24 Mom confirms pt smokes about 2 or 3 cigarettes a day.    Vaping Use    Vaping status: Never Used   Substance and Sexual Activity    Alcohol use: Not Currently    Drug use: Not Currently     Frequency: 7.0 times per week     Types: Amphetamines 
area of left-sided encephalomalacia is evident. A subdural hematoma on the left is present, overall similar. Small focus of intraventricular hemorrhage in the lateral ventricle is similar. There are no new areas of mass effect in the brain. Small foci of pneumocephalus are present.     Stable CT brain. Electronically signed by Juve Montes De Oca DO    CT Head wo Contrast    Result Date: 11/22/2024  EXAM: CT HEAD WO CONTRAST INDICATION: post op cranioplasty COMPARISON: MRI dated October 22, 2023 TECHNIQUE: Standard per department protocol. Up-to-date CT equipment and radiation dose reduction techniques were employed. FINDINGS: Postsurgical changes from left-sided cranioplasty with mild underlying extra-axial hemorrhage and mild pneumocephalus. The area of hemorrhage overlies the left temporal convexity with a maximum diameter of 0.9 cm. There is also a small amount of hemorrhage within the left lateral ventricle. Extensive encephalomalacia is noted in the left MCA territory.  Ex vacuo dilation of the left lateral ventricle.     Postsurgical changes from left cranioplasty with mild underlying extra-axial hemorrhage measuring 0.9 cm overlying the temporal convexity. Small amount of hemorrhage within the left lateral ventricle. Mild pneumocephalus. No mass effect. Electronically signed by Lee De MD      CBC:   Recent Labs     11/21/24  1124 11/22/24 2045 11/23/24  0424   WBC 6.1 11.4* 14.4*   HGB 11.8* 11.6* 10.9*    257 328     BMP:    Recent Labs     11/21/24  1124 11/22/24 2045 11/23/24  0424   * 142 145   K 4.4 4.0 3.6    107 107   CO2 25 20* 23   BUN 19 13 13   CREATININE 0.9 0.7 0.7   GLUCOSE 103* 157* 121*     Hepatic:   Recent Labs     11/22/24 2045   AST 20   ALT 12   BILITOT <0.2   ALKPHOS 86     Lipids:   Lab Results   Component Value Date/Time    CHOL 120 12/04/2023 01:08 PM    HDL 54 12/04/2023 01:08 PM    TRIG 87 12/04/2023 01:08 PM     Hemoglobin A1C:   Lab Results   Component 
  -Tone: Normal throughout  -Gait & Station: deferred for pt safety  -Other: no adventitious movements noted    Imaging:  All reports below personally reviewed & actual images reviewed where indicated. Pertinent positives & negatives are addressed in Assessment & Plan section of note  CT Head wo Contrast   Final Result      Postsurgical changes from left cranioplasty with mild underlying extra-axial hemorrhage measuring 0.9 cm overlying the temporal convexity. Small amount of hemorrhage within the left lateral ventricle. Mild pneumocephalus. No mass effect.      Electronically signed by Lee De MD      CT HEAD WO CONTRAST    (Results Pending)     Laboratory Review:   All results below personally reviewed. Pertinent positives & negatives are addressed in Assessment & Plan section of note  Recent Results (from the past 72 hour(s))   Basic Metabolic Panel    Collection Time: 11/21/24 11:24 AM   Result Value Ref Range    Sodium 148 (H) 136 - 145 mmol/L    Potassium 4.4 3.5 - 5.1 mmol/L    Chloride 108 99 - 110 mmol/L    CO2 25 21 - 32 mmol/L    Anion Gap 15 3 - 16    Glucose 103 (H) 70 - 99 mg/dL    BUN 19 7 - 20 mg/dL    Creatinine 0.9 0.6 - 1.1 mg/dL    Est, Glom Filt Rate 82 >60    Calcium 9.3 8.3 - 10.6 mg/dL   CBC with Auto Differential    Collection Time: 11/21/24 11:24 AM   Result Value Ref Range    WBC 6.1 4.0 - 11.0 K/uL    RBC 4.53 4.00 - 5.20 M/uL    Hemoglobin 11.8 (L) 12.0 - 16.0 g/dL    Hematocrit 37.1 36.0 - 48.0 %    MCV 81.9 80.0 - 100.0 fL    MCH 26.1 26.0 - 34.0 pg    MCHC 31.9 31.0 - 36.0 g/dL    RDW 33.2 (H) 12.4 - 15.4 %    Platelets 355 135 - 450 K/uL    MPV 9.1 5.0 - 10.5 fL    Path Consult No     Neutrophils % 52.4 %    Lymphocytes % 37.7 %    Monocytes % 6.6 %    Eosinophils % 2.8 %    Basophils % 0.5 %    Neutrophils Absolute 3.2 1.7 - 7.7 K/uL    Lymphocytes Absolute 2.3 1.0 - 5.1 K/uL    Monocytes Absolute 0.4 0.0 - 1.3 K/uL    Eosinophils Absolute 0.2 0.0 - 0.6 K/uL    Basophils

## 2024-11-24 NOTE — PLAN OF CARE
Problem: Discharge Planning  Goal: Discharge to home or other facility with appropriate resources  Outcome: Progressing     Problem: Safety - Adult  Goal: Free from fall injury  Outcome: Progressing     Problem: Skin/Tissue Integrity  Goal: Absence of new skin breakdown  Description: 1.  Monitor for areas of redness and/or skin breakdown  2.  Assess vascular access sites hourly  3.  Every 4-6 hours minimum:  Change oxygen saturation probe site  4.  Every 4-6 hours:  If on nasal continuous positive airway pressure, respiratory therapy assess nares and determine need for appliance change or resting period.  Outcome: Progressing     Problem: Pain  Goal: Verbalizes/displays adequate comfort level or baseline comfort level  Outcome: Progressing     Problem: Chronic Conditions and Co-morbidities  Goal: Patient's chronic conditions and co-morbidity symptoms are monitored and maintained or improved  Outcome: Progressing     Problem: Neurosensory - Adult  Goal: Achieves stable or improved neurological status  Outcome: Progressing  Goal: Absence of seizures  Outcome: Progressing  Goal: Remains free of injury related to seizures activity  Outcome: Progressing  Goal: Achieves maximal functionality and self care  Outcome: Progressing     Problem: ABCDS Injury Assessment  Goal: Absence of physical injury  Outcome: Progressing

## 2024-11-24 NOTE — PLAN OF CARE
Brief note:   43y.o female with past medical hx of L MCA stroke she underwent left hemicraniectomy 10/2023 for large ischemic stroke. She has persistent expressive aphasia and right sided weakness since her stroke. Initially after surgery she had some vomiting, and was slow to return to baseline but is now improved after a scopolamine patch and doing much better     GT drain removed today.   C/o incisional pain and just received pain medications  She is also having some mild nausea as well. RN to give Zofran per MAR.   No other complaints.    PHYSICAL EXAM:  Vitals:    11/23/24 2300 11/24/24 0000 11/24/24 0048 11/24/24 0100   BP:  (!) 143/85     Pulse: 85 96  71   Resp: 16 18 18 16   Temp:  98.9 °F (37.2 °C)     TempSrc:  Oral     SpO2: 96% 98%  95%   Weight:       Height:             General: Alert, no distress, well-nourished  Neurologic  Mental status:   orientation to person, place, and situation with options  Struggle answering the year even with options provided.    Attention intact as able to attend well to the exam     Language expressively aphasic with mild receptive aphasia.    Comprehension intact; follows simple commands    Cranial nerves:   CN 3,4,6: Pupils 3 mm > 2 mm equal and reactive to light, extraocular muscles intact  CN5: Facial sensation symmetric   CN7: Flattening of right nasolabial fold.  CN8: Hearing symmetric to spoken voice  CN9: Palate elevated symmetrically  CN11: Traps full strength on shoulder shrug on the left. Absent on the right.   CN12: MEREDITH, patient did not perform command, even with miming.     Motor Exam:  RUE: 0/5, WTP  RLE: 0/5, WTP  LUE: 5/5 strength, sensation intact to light touch.   LLE: 5/5 strength, sensation intact to light touch.       Cerebellar/coordination: finger nose finger normal without ataxia in LUE. MEREDITH in the RUE secondary to weakness.   Tone: normal in all 4 extremities  Gait: Deferred for safety  Incision: C/D/I    OTHER SYSTEMS:  Cardiovascular: Warm,

## 2024-11-24 NOTE — PLAN OF CARE
Problem: Discharge Planning  Goal: Discharge to home or other facility with appropriate resources  11/24/2024 1155 by Odessa Bermeo RN  Outcome: Adequate for Discharge  11/24/2024 1155 by Odessa Bermeo RN  Outcome: Adequate for Discharge  Flowsheets (Taken 11/24/2024 0800)  Discharge to home or other facility with appropriate resources:   Identify barriers to discharge with patient and caregiver   Identify discharge learning needs (meds, wound care, etc)  11/24/2024 0527 by Corinne Hill RN  Outcome: Progressing     Problem: Safety - Adult  Goal: Free from fall injury  11/24/2024 1155 by Odessa Bermeo RN  Outcome: Adequate for Discharge  11/24/2024 1155 by Odessa Bermeo RN  Outcome: Adequate for Discharge  11/24/2024 0527 by Corinne Hill RN  Outcome: Progressing     Problem: Skin/Tissue Integrity  Goal: Absence of new skin breakdown  Description: 1.  Monitor for areas of redness and/or skin breakdown  2.  Assess vascular access sites hourly  3.  Every 4-6 hours minimum:  Change oxygen saturation probe site  4.  Every 4-6 hours:  If on nasal continuous positive airway pressure, respiratory therapy assess nares and determine need for appliance change or resting period.  11/24/2024 1155 by Odessa Bermeo RN  Outcome: Adequate for Discharge  11/24/2024 1155 by Odessa Bermeo RN  Outcome: Adequate for Discharge  11/24/2024 0527 by Corinne Hill RN  Outcome: Progressing     Problem: Pain  Goal: Verbalizes/displays adequate comfort level or baseline comfort level  11/24/2024 1155 by Odessa Bermeo RN  Outcome: Adequate for Discharge  11/24/2024 1155 by Odessa Bermeo RN  Outcome: Adequate for Discharge  11/24/2024 0527 by Corinne Hill RN  Outcome: Progressing     Problem: Chronic Conditions and Co-morbidities  Goal: Patient's chronic conditions and co-morbidity symptoms are monitored and maintained or improved  11/24/2024 1155 by Odessa Bermeo RN  Outcome: Adequate for

## 2024-11-24 NOTE — PLAN OF CARE
Patient doing well this morning.  No acute issues overnight.   Vitals are stable.  Incision is intact, open to air, well approximated.     Exam:  Mental status:   Awake and alert. Smiling, pleasant. Global aphasia expressive>receptive. Has trouble following commands due to aphasia. Attends well to exam.    Cranial nerves:   CN2: visual fields appear grossly intact  CN 3,4,6: Pupils equal and reactive to light, extraocular muscles grossly intact  CN7: mild right sided facial weakness    Motor Exam:  RUE: she withdraws to painful stimuli, 0/5 movement  RLE: she withdraws to painful stimuli, 0/5 movement  LUE: 5/5 she is able to lift arm up off the bed and hold it up,  strong  strength  LLE: 5/5 lifts leg up off of bed is able to hold it up.     Sensory: light touch intact and symmetric in all 4 extremities.   Tone: decreased in RUE/RLE  Incision: well approximated, no drainage, staples     Cardiovascular: Warm, appears well perfused   Respiratory: Easy, non-labored respiratory pattern   Abdominal: Abdomen is without distention   Extremities: Upper and lower extremities are atraumatic in appearance without deformity.  Lines: PIV's    Plan for discharge later this afternoon pending PT/OT evaluation.   Can resume aspirin tomorrow  Needs incision cleansed daily with soap and water  Has appt with Dr. Ramírez in weeks for suture and staple removal       Keagan ADAMES - CNP  Neurology and [facility] critical care  [phone]

## 2024-11-24 NOTE — DISCHARGE INSTR - COC
Continuity of Care Form    Patient Name: Melody Carroll   :  1980  MRN:  [id number]    Admit date:  2024  Discharge date:  ***    Code Status Order: Full Code   Advance Directives:   Advance Care Flowsheet Documentation        Date/Time Healthcare Directive Type of Healthcare Directive Copy in Chart Healthcare Agent Appointed Healthcare Agent's Name Healthcare Agent's Phone Number    24 0728 No, patient does not have an advance directive for healthcare treatment  --  --  --  --  --                     Admitting Physician:  Dane Ramírez MD  PCP: Chey Parisi, APRN - CNP    Discharging Nurse: ***  Discharging Hospital Unit/Room#: 4502/4502-01  Discharging Unit Phone Number: ***    Emergency Contact:   Extended Emergency Contact Information  Primary Emergency Contact: Liliana Carroll  Mobile Phone: [phone]  Relation: Parent  Preferred language: English  Secondary Emergency Contact: Padmini Carroll  Mobile Phone: [phone]  Relation: Brother/Sister  Preferred language: English    Past Surgical History:  Past Surgical History:   Procedure Laterality Date    APPENDECTOMY      CRANIOPLASTY Left 2024    LEFT CRANIOPLASTY performed by Dane Ramírez MD at [facility] OR    CRANIOTOMY Left 10/21/2023    LEFT DECOMPRESSIVE FLORA-CRANIECTOMY performed by Dane Ramírez MD at [facility] OR    ENDOSCOPY, COLON, DIAGNOSTIC      GASTROSTOMY TUBE PLACEMENT N/A 2023    Esophagogastroduodenoscopy with Percutaneous endoscopic gastrostomy tube placement performed by Zurdo Parrish MD at [facility] ENDOSCOPY    PEG TUBE REMOVAL  2024       Immunization History:   Immunization History   Administered Date(s) Administered    Influenza, FLUCELVAX, (age 6 mo+), MDCK, Quadv PF, 0.5mL 2023    Pneumococcal, PCV20, PREVNAR 20, (age 6w+), IM, 0.5mL 2023       Active Problems:  Patient Active Problem List   Diagnosis Code    Cerebrovascular accident (CVA) due to embolism of left middle

## 2024-11-24 NOTE — DISCHARGE SUMMARY
. Discharge Summary    Melody Carroll  :  1980  MRN:  [id number]    ADMIT DATE:  2024  DISCHARGE DATE:  2024    PRIMARY CARE PHYSICIAN:  Chey Parisi, APRN - CNP    VISIT STATUS: Admission    CODE STATUS:  Full Code    DISCHARGE DIAGNOSES:  Principal Problem (Resolved):    Acquired skull defect  Active Problems:    * No active hospital problems. *  Resolved Problems:    Acquired deformity of skull      HOSPITAL COURSE:  Melody suffered a large, embolic left MCA stroke back in October for which she then went on to develop malignant edema requiring hemicraniectomy. She presents back on  for a left cranioplasty with Dr. Ramírez. Her post op course was relatively benign. Her pain was well controlled with minimal narcotic use. Her incision was well approximated with no significant drainage or concerns. Her exam remained at/near baseline. She worked with PT/OT who ultimately recommenced 24hr direct assistance. Her mother had no concerns with taking her back home.    SIGNIFICANT DIAGNOSTIC STUDIES:  Had CT without contrast: 24  Postsurgical changes from left cranioplasty with mild underlying extra-axial hemorrhage measuring 0.9 cm overlying the temporal convexity. Small amount of hemorrhage within the left lateral ventricle. Mild pneumocephalus. No mass effect.     Head CT without contrast: 24  Stable CT brain.     CONSULTANTS:  Hospitalist  Neuro critical care   PT/OT    RECOMMENDED NEXT STEPS:   Discharge home with mother  Follow up with Dr. Ramírez in 2 week for staple removal  Can start taking aspirin again tomorrow   Clean incision daily with soap and water     DISCHARGE MEDICATIONS:         Medication List        START taking these medications      oxyCODONE 5 MG immediate release tablet  Commonly known as: ROXICODONE  Take 1 tablet by mouth every 6 hours as needed for Pain for up to 3 days. Max Daily Amount: 20 mg            CONTINUE taking these medications      *

## 2024-11-27 LAB
BACTERIA SPEC AEROBE CULT: NORMAL
BACTERIA SPEC ANAEROBE CULT: NORMAL
GRAM STN SPEC: NORMAL

## 2024-12-02 LAB
FUNGUS SPEC CULT: NORMAL
LOEFFLER MB STN SPEC: NORMAL

## 2024-12-09 LAB
FUNGUS SPEC CULT: NORMAL
LOEFFLER MB STN SPEC: NORMAL

## 2024-12-16 LAB
FUNGUS SPEC CULT: NORMAL
LOEFFLER MB STN SPEC: NORMAL

## 2024-12-23 LAB
FUNGUS SPEC CULT: NORMAL
LOEFFLER MB STN SPEC: NORMAL

## (undated) DEVICE — COVER LT HNDL CAM BLU DISP W/ SURG CTRL

## (undated) DEVICE — PRESSURE MONITORING SET: Brand: TRUWAVE, VAMP PLUS

## (undated) DEVICE — 3M™ TEGADERM™ CHG CHLORHEXIDINE GLUCONATE GEL PAD 1664, 25 EACH/CARTON, 4 CARTONS/CASE: Brand: 3M™ TEGADERM™

## (undated) DEVICE — AGENT HEMSTAT W2XL4IN OXIDIZED REGENERATED CELOS ABSRB SFT

## (undated) DEVICE — TOOL MR8-9BA50 MR8 9CM BALL 5MM: Brand: MIDAS REX MR8

## (undated) DEVICE — 3M™ TEGADERM™ TRANSPARENT FILM DRESSING FRAME STYLE, 1624W, 2-3/8 IN X 2-3/4 IN (6 CM X 7 CM), 100/CT 4CT/CASE: Brand: 3M™ TEGADERM™

## (undated) DEVICE — CRANI: Brand: MEDLINE INDUSTRIES, INC.

## (undated) DEVICE — SOLUTION IV 500ML 0.9% SOD CHL PH 5 INJ USP VIAFLX PLAS

## (undated) DEVICE — DRESSING GERM DIA1IN CNTR H DIA7MM BLU CHG ANTIMIC PROTCT

## (undated) DEVICE — SPONGE,LAP,18"X18",DLX,XR,ST,5/PK,40/PK: Brand: MEDLINE

## (undated) DEVICE — BANDAGE,GAUZE,BULKEE II,4.5"X4.1YD,STRL: Brand: MEDLINE

## (undated) DEVICE — AGENT HEMOSTATIC SURGIFLOW MATRIX KIT W/THROMBIN

## (undated) DEVICE — SET BLOOD COLL 25GA L0.75N WITHOUT HLDR VNPNCTRE WTH ADPTR M

## (undated) DEVICE — CATH 46118 EDM VENT 35CM W/TROCAR

## (undated) DEVICE — TOWEL,STOP FLAG GOLD N-W: Brand: MEDLINE

## (undated) DEVICE — NEURO SPONGES: Brand: DEROYAL

## (undated) DEVICE — STAPLER SKIN H3.9MM WIRE DIA0.58MM CRWN 6.9MM 35 STPL ROT

## (undated) DEVICE — COTTON BALLS, DOUBLE STRUNG: Brand: DEROYAL

## (undated) DEVICE — GLOVE SURG SZ 8 L11.6IN THK9.8MIL STRW LTX POLYMER BEAD CUF

## (undated) DEVICE — SUTURE ETHILON SZ 3-0 L18IN NONABSORBABLE BLK PS-2 L19MM 3/8 1669H

## (undated) DEVICE — ADAPTER LAB INTMED LUER 17GA

## (undated) DEVICE — SUTURE VCRL SZ 2-0 L18IN ABSRB UD CT-1 L36MM 1/2 CIR J839D

## (undated) DEVICE — BLADE CLIPPER SURG SENSICLIP

## (undated) DEVICE — SUTURE VICRYL + SZ 3-0 L18IN ABSRB UD SH 1/2 CIR TAPERCUT NDL VCP864D

## (undated) DEVICE — SUTURE NRLN SZ 4-0 L18IN NONABSORBABLE BLK L13MM TF 1/2 CIR C584D

## (undated) DEVICE — CODMAN® SURGICAL PATTIES 3/4" X 3/4" (1.91CM X 1.91CM): Brand: CODMAN®

## (undated) DEVICE — HOOK RETRACT STERIL 12MM S STL BLNT E STAY LONE STAR

## (undated) DEVICE — SUTURE VICRYL + SZ 2-0 L27IN ABSRB VLT SH L26MM 1/2 CIR TAPR

## (undated) DEVICE — RESERVOIR,SUCTION,100CC,SILICONE: Brand: MEDLINE

## (undated) DEVICE — BLADE ES ELASTOMERIC COAT INSUL DURABLE BEND UPTO 90DEG

## (undated) DEVICE — CANNULA SAMP CO2 AD GRN 7FT CO2 AND 7FT O2 TBNG UNIV CONN

## (undated) DEVICE — ANES EXTENSION SET 90IN-LF: Brand: MEDLINE INDUSTRIES, INC.

## (undated) DEVICE — SUTURE MONOCRYL + SZ 4-0 L27IN ABSRB UD L19MM PS-2 3/8 CIR MCP426H

## (undated) DEVICE — SUTURE VICRYL + SZ 2-0 L27IN ABSRB WHT SH 1/2 CIR TAPERCUT VCP417H

## (undated) DEVICE — GARMENT,MEDLINE,DVT,INT,CALF,MED, GEN2: Brand: MEDLINE

## (undated) DEVICE — KIT CATHETER 20GA L12CM ART CUST

## (undated) DEVICE — APPLICATOR MEDICATED 10.5 CC SOLUTION HI LT ORNG CHLORAPREP

## (undated) DEVICE — DRESSING FOAM DISK DIA1IN H 7MM HYDRPHLC CHG IMPREG IN SL

## (undated) DEVICE — SSC BONE WAX: Brand: SSC BONE WAX

## (undated) DEVICE — NEPTUNE E-SEP SMOKE EVACUATION PENCIL, COATED, 70MM BLADE, PUSH BUTTON SWITCH: Brand: NEPTUNE E-SEP

## (undated) DEVICE — SPHERES FOR BRAINLAB

## (undated) DEVICE — COVER XR CASS W21XL40IN UNIV ADH MICROSHIELD

## (undated) DEVICE — DRAIN SURG FLAT W7MMXL20CM FULL PERF

## (undated) DEVICE — TUBING

## (undated) DEVICE — SOLUTION IV 1000ML 0.9% SOD CHL

## (undated) DEVICE — PEG KIT STD 20 FR PUL W/ ENFIT ENDOVIVE

## (undated) DEVICE — DRAIN SURG 10FR END PERF 1/8IN SIL RND SMOOTH HEAT POLISHED

## (undated) DEVICE — TOOL MR8-F2/7TA23 MR8 F2/7CM TAPER 2.3MM: Brand: MIDAS REX MR8

## (undated) DEVICE — COVER LT HNDL BLU PLAS

## (undated) DEVICE — CODMAN® SURGICAL PATTIES 1/4" X 1/4" (0.64CM X 0.64CM): Brand: CODMAN®

## (undated) DEVICE — SUTURE VICRYL + SZ 2-0 L18IN ABSRB VLT L26MM SH 1/2 CIR VCP775D

## (undated) DEVICE — TRAP FLUID